# Patient Record
Sex: MALE | Race: WHITE | Employment: OTHER | URBAN - METROPOLITAN AREA
[De-identification: names, ages, dates, MRNs, and addresses within clinical notes are randomized per-mention and may not be internally consistent; named-entity substitution may affect disease eponyms.]

---

## 2018-06-27 ENCOUNTER — HOSPITAL ENCOUNTER (OUTPATIENT)
Dept: MRI IMAGING | Age: 83
Discharge: HOME | End: 2018-06-29
Payer: MEDICARE

## 2018-06-27 DIAGNOSIS — M54.17 LUMBOSACRAL RADICULOPATHY: ICD-10-CM

## 2018-06-27 PROCEDURE — 72148 MRI LUMBAR SPINE W/O DYE: CPT

## 2018-11-01 ENCOUNTER — HOSPITAL ENCOUNTER (OUTPATIENT)
Dept: ULTRASOUND IMAGING | Age: 83
Discharge: HOME | End: 2018-11-01
Payer: MEDICARE

## 2018-11-01 ENCOUNTER — HOSPITAL ENCOUNTER (OUTPATIENT)
Dept: ULTRASOUND IMAGING | Age: 83
End: 2018-11-01
Payer: MEDICARE

## 2018-11-01 DIAGNOSIS — R33.9 BLADDER RETENTION: ICD-10-CM

## 2018-11-01 PROCEDURE — 76775 US EXAM ABDO BACK WALL LIM: CPT

## 2018-11-01 PROCEDURE — 76770 US EXAM ABDO BACK WALL COMP: CPT

## 2019-02-22 ENCOUNTER — HOSPITAL ENCOUNTER (OUTPATIENT)
Age: 84
Discharge: HOME | End: 2019-02-22
Payer: MEDICARE

## 2019-02-22 LAB
ALBUMIN SERPL-MCNC: 4.2 G/DL (ref 3.5–5.2)
ALP BLD-CCNC: 101 U/L (ref 40–129)
ALT SERPL-CCNC: 21 U/L (ref 0–40)
ANION GAP SERPL CALCULATED.3IONS-SCNC: 9 MMOL/L (ref 7–16)
AST SERPL-CCNC: 20 U/L (ref 0–39)
BASOPHILS ABSOLUTE: 0.02 E9/L (ref 0–0.2)
BASOPHILS RELATIVE PERCENT: 0.3 % (ref 0–2)
BILIRUB SERPL-MCNC: 0.7 MG/DL (ref 0–1.2)
BUN BLDV-MCNC: 25 MG/DL (ref 8–23)
CALCIUM SERPL-MCNC: 9.2 MG/DL (ref 8.6–10.2)
CHLORIDE BLD-SCNC: 104 MMOL/L (ref 98–107)
CO2: 26 MMOL/L (ref 22–29)
CREAT SERPL-MCNC: 1.3 MG/DL (ref 0.7–1.2)
EOSINOPHILS ABSOLUTE: 0.16 E9/L (ref 0.05–0.5)
EOSINOPHILS RELATIVE PERCENT: 2.6 % (ref 0–6)
GAMMA GLUTAMYL TRANSFERASE: 18 U/L (ref 10–71)
GFR AFRICAN AMERICAN: >60
GFR NON-AFRICAN AMERICAN: 52 ML/MIN/1.73
GLUCOSE BLD-MCNC: 117 MG/DL (ref 74–99)
HBA1C MFR BLD: 5.8 % (ref 4–5.6)
HCT VFR BLD CALC: 39.7 % (ref 37–54)
HEMOGLOBIN: 12.6 G/DL (ref 12.5–16.5)
IMMATURE GRANULOCYTES #: 0.02 E9/L
IMMATURE GRANULOCYTES %: 0.3 % (ref 0–5)
LYMPHOCYTES ABSOLUTE: 1.56 E9/L (ref 1.5–4)
LYMPHOCYTES RELATIVE PERCENT: 25.2 % (ref 20–42)
MCH RBC QN AUTO: 31 PG (ref 26–35)
MCHC RBC AUTO-ENTMCNC: 31.7 % (ref 32–34.5)
MCV RBC AUTO: 97.5 FL (ref 80–99.9)
MONOCYTES ABSOLUTE: 0.48 E9/L (ref 0.1–0.95)
MONOCYTES RELATIVE PERCENT: 7.7 % (ref 2–12)
NEUTROPHILS ABSOLUTE: 3.96 E9/L (ref 1.8–7.3)
NEUTROPHILS RELATIVE PERCENT: 63.9 % (ref 43–80)
PDW BLD-RTO: 12.9 FL (ref 11.5–15)
PLATELET # BLD: 163 E9/L (ref 130–450)
PMV BLD AUTO: 10.7 FL (ref 7–12)
POTASSIUM SERPL-SCNC: 4.9 MMOL/L (ref 3.5–5)
RBC # BLD: 4.07 E12/L (ref 3.8–5.8)
SODIUM BLD-SCNC: 139 MMOL/L (ref 132–146)
TOTAL PROTEIN: 7.1 G/DL (ref 6.4–8.3)
WBC # BLD: 6.2 E9/L (ref 4.5–11.5)

## 2019-02-22 PROCEDURE — 36415 COLL VENOUS BLD VENIPUNCTURE: CPT

## 2019-02-22 PROCEDURE — 83036 HEMOGLOBIN GLYCOSYLATED A1C: CPT

## 2019-02-22 PROCEDURE — 82977 ASSAY OF GGT: CPT

## 2019-02-22 PROCEDURE — 80053 COMPREHEN METABOLIC PANEL: CPT

## 2019-02-22 PROCEDURE — 85025 COMPLETE CBC W/AUTO DIFF WBC: CPT

## 2019-02-26 ENCOUNTER — HOSPITAL ENCOUNTER (OUTPATIENT)
Dept: CT IMAGING | Age: 84
Discharge: HOME | End: 2019-02-26
Payer: MEDICARE

## 2019-02-26 DIAGNOSIS — I70.219 ATHEROSCLEROSIS OF NATIVE ARTERY OF LOWER EXTREMITY WITH INTERMITTENT CLAUDICATION, UNSPECIFIED LATERALITY (HCC): ICD-10-CM

## 2019-02-26 PROCEDURE — 70460 CT HEAD/BRAIN W/DYE: CPT

## 2019-02-26 PROCEDURE — 6360000004 HC RX CONTRAST MEDICATION: Performed by: RADIOLOGY

## 2019-02-26 RX ADMIN — IOPAMIDOL 50 ML: 755 INJECTION, SOLUTION INTRAVENOUS at 11:05

## 2019-03-25 ENCOUNTER — HOSPITAL ENCOUNTER (OUTPATIENT)
Age: 84
Discharge: HOME | End: 2019-03-25
Payer: MEDICARE

## 2019-03-25 PROCEDURE — 88350 IMFLUOR EA ADDL 1ANTB STN PX: CPT

## 2019-03-25 PROCEDURE — 88346 IMFLUOR 1ST 1ANTB STAIN PX: CPT

## 2019-03-25 PROCEDURE — 83516 IMMUNOASSAY NONANTIBODY: CPT

## 2019-04-03 LAB
Lab: NORMAL
REPORT: NORMAL
THIS TEST SENT TO: NORMAL

## 2019-04-30 ENCOUNTER — OFFICE VISIT (OUTPATIENT)
Dept: NEUROLOGY | Age: 84
End: 2019-04-30
Payer: MEDICARE

## 2019-04-30 VITALS
DIASTOLIC BLOOD PRESSURE: 50 MMHG | HEIGHT: 68 IN | SYSTOLIC BLOOD PRESSURE: 130 MMHG | BODY MASS INDEX: 27.43 KG/M2 | WEIGHT: 181 LBS

## 2019-04-30 DIAGNOSIS — M48.061 SPINAL STENOSIS OF LUMBAR REGION, UNSPECIFIED WHETHER NEUROGENIC CLAUDICATION PRESENT: ICD-10-CM

## 2019-04-30 DIAGNOSIS — G25.2 INTENTION TREMOR: ICD-10-CM

## 2019-04-30 DIAGNOSIS — Z98.890 HISTORY OF CAROTID ENDARTERECTOMY: Chronic | ICD-10-CM

## 2019-04-30 DIAGNOSIS — R26.2 AMBULATORY DYSFUNCTION: Chronic | ICD-10-CM

## 2019-04-30 DIAGNOSIS — Z95.828 HISTORY OF LEFT COMMON CAROTID ARTERY STENT PLACEMENT: Chronic | ICD-10-CM

## 2019-04-30 DIAGNOSIS — Z98.890 HISTORY OF LEFT COMMON CAROTID ARTERY STENT PLACEMENT: Chronic | ICD-10-CM

## 2019-04-30 DIAGNOSIS — M62.838 MUSCLE SPASTICITY: Primary | ICD-10-CM

## 2019-04-30 PROCEDURE — G8598 ASA/ANTIPLAT THER USED: HCPCS | Performed by: PSYCHIATRY & NEUROLOGY

## 2019-04-30 PROCEDURE — 1036F TOBACCO NON-USER: CPT | Performed by: PSYCHIATRY & NEUROLOGY

## 2019-04-30 PROCEDURE — G8419 CALC BMI OUT NRM PARAM NOF/U: HCPCS | Performed by: PSYCHIATRY & NEUROLOGY

## 2019-04-30 PROCEDURE — 99204 OFFICE O/P NEW MOD 45 MIN: CPT | Performed by: PSYCHIATRY & NEUROLOGY

## 2019-04-30 PROCEDURE — 4040F PNEUMOC VAC/ADMIN/RCVD: CPT | Performed by: PSYCHIATRY & NEUROLOGY

## 2019-04-30 PROCEDURE — 1123F ACP DISCUSS/DSCN MKR DOCD: CPT | Performed by: PSYCHIATRY & NEUROLOGY

## 2019-04-30 PROCEDURE — G8428 CUR MEDS NOT DOCUMENT: HCPCS | Performed by: PSYCHIATRY & NEUROLOGY

## 2019-04-30 RX ORDER — TAMSULOSIN HYDROCHLORIDE 0.4 MG/1
0.8 CAPSULE ORAL NIGHTLY
Refills: 0 | COMMUNITY
Start: 2019-04-11

## 2019-04-30 RX ORDER — METOPROLOL TARTRATE 50 MG/1
25 TABLET, FILM COATED ORAL 2 TIMES DAILY
COMMUNITY

## 2019-04-30 ASSESSMENT — ENCOUNTER SYMPTOMS
ALLERGIC/IMMUNOLOGIC NEGATIVE: 1
RESPIRATORY NEGATIVE: 1
EYES NEGATIVE: 1
BACK PAIN: 1
GASTROINTESTINAL NEGATIVE: 1

## 2019-04-30 NOTE — PROGRESS NOTES
Neurology Consult Note:    Patient: Ba Correa  : 1931  Date: 19  Referring provider: Pina Resendez MD    Referral to Neurology: Neurologic evaluation for possible Parkinson's. History of severe multilevel lumbar spinal stenosis, hx chronic ambulatory dysfunction-using quad cane. Dear Pina Resendez MD     Thank you for your referral of Ba Correa an 72-year-old man referred for evaluation of possible Parkinson's. He was previously tried on Sinemet twice but reports he had a \"skin rash or reaction\" and it was discontinued, but he did not notice any difference or improvement of his symptoms including gait, speech, or slowness of movement. He has been using a quad cane for about 8 months. He has chronic ambulatory dysfunction over the past 2 years approximately by his account. He has no pill-rolling or resting tremors observed. There is stiffness reported of the left greater than right arms. There is no history of TIA symptoms. Lab Data: Reviewed from 19; elevated BUN/creatinine, blood glucose 117. Normal LFTs. Imaging Data:, head CT scan without contrast, reviewed:   Prominence of ventricles and sulci is compatible with age-related   volume loss. No extra-axial collection. No acute intracranial   hemorrhage. No cerebral edema or mass effect. No CT evidence of acute,   large territorial infarction. There are a few patchy areas of   hypoattenuation within the periventricular and subcortical white   matter, which are nonspecific but most compatible with mild changes of   microangiopathy. There is no abnormal brain parenchymal enhancement. Major dural venous   sinuses demonstrate normal enhancement. 18, MR lumbar spine, reviewed:  Multilevel spinal stenosis. Central spinal canal stenosis   is most severe at the L4-5 level. Mild progression since previous   study. 6/30/15, X-ray lumbar spine:   Five nonrib-bearing lumbar vertebral bodies.  Mild lumbar  History of left common carotid artery stent placement    Ambulatory dysfunction       Past Medical History:   Diagnosis Date    Ambulatory dysfunction 4/30/2019    Hx severe lumbar psinal stenosis, severe at L4/5 level-MR lumbar spine, 2018    Blood transfusion     CAD (coronary artery disease)     CH[facility]cong[facility] heart failure) ([facility])     Diabetes mellitus ([facility]     History of carotid endarterectomy 4/30/2019    Right CEA    History of left common carotid artery stent placement 4/30/2019    Hyperlipidemia     Hypertension        Past Surgical History:   Procedure Laterality Date    ABDOMINAL AORTIC ANEURYSM REPAIR, ENDOVASCULAR  1 year ago    2 stents    BACK SURGERY      CARDIAC SURGERY  2000    2 vesselCABG    CAROTID ENDARTERECTOMY  rt and left stent in rt    Bilateral    CATARACT REMOVAL WITH IMPLANT Right 04/08/14    CATARACT REMOVAL WITH IMPLANT Left 9 23 14    COLONOSCOPY      ENDOSCOPY, COLON, DIAGNOSTIC      TONSILLECTOMY      UPPER GASTROINTESTINAL ENDOSCOPY         No family history on file. no hx hereditary neuropathy/neurmuscular disease known.     Social History     Socioeconomic History    Marital status:      Spouse name: Not on file    Number of children: Not on file    Years of education: Not on file    Highest education level: Not on file   Occupational History    Not on file   Social Needs    Financial resource strain: Not on file    Food insecurity:     Worry: Not on file     Inability: Not on file    Transportation needs:     Medical: Not on file     Non-medical: Not on file   Tobacco Use    Smoking status: Former Smoker    Smokeless tobacco: Never Used    Tobacco comment: quit smoking 15 yrs ago   Substance and Sexual Activity    Alcohol use: No    Drug use: No    Sexual activity: Not on file   Lifestyle    Physical activity:     Days per week: Not on file     Minutes per session: Not on file    Stress: Not on file   Relationships    Social connections:     Talks on phone: Not on file     Gets together: Not on file     Attends Sabianism service: Not on file     Active member of club or organization: Not on file     Attends meetings of clubs or organizations: Not on file     Relationship status: Not on file    Intimate partner violence:     Fear of current or ex partner: Not on file     Emotionally abused: Not on file     Physically abused: Not on file     Forced sexual activity: Not on file   Other Topics Concern    Not on file   Social History Narrative    Not on file     Review of Systems   Constitutional: Negative. HENT: Positive for hearing loss. Eyes: Negative. Respiratory: Negative. Cardiovascular: Negative. Gastrointestinal: Negative. Endocrine: Negative. Genitourinary: Negative. Musculoskeletal: Positive for arthralgias, back pain and gait problem. Hx severe lumbar spinal stenosis, most significant at L4/5 level   Skin: Negative. Allergic/Immunologic: Negative. Neurological:        Chronic ambulatory dysfunction; mild intention tremor on FTN testing   Hematological: Negative. Psychiatric/Behavioral: The patient is nervous/anxious. All other systems reviewed and are negative. Neurologic Exam:  BP (!) 130/50 (Site: Right Upper Arm, Position: Sitting, Cuff Size: Medium Adult)   Ht 5' 8\" (1.727 m)   Wt 181 lb (82.1 kg)   BMI 27.52 kg/m²   General appearance: Alert, cooperative, anxious, well-nourished, well-groomed, seated next to his granddaughter, no acute distress  HEENT: Normocephalic/atraumatic. Neck: Supple  Cardiac: RRR  Respiratory: grossly clear  Extremities: No edema, erythema  Skin: No apparent lesions or rashes  Musculoskeletal: Negative bilateral straight leg raising test, no fasciculations. Mild intention tremor on finger-to-nose testing. No resting or pill-rolling tremors observed. No foot drop.   Mental Status: Alert, oriented ×3  Speech/Language: Mildly hypophonic, no paraphasic

## 2019-05-11 ENCOUNTER — HOSPITAL ENCOUNTER (OUTPATIENT)
Age: 84
Discharge: HOME | End: 2019-05-11
Payer: MEDICARE

## 2019-05-11 ENCOUNTER — HOSPITAL ENCOUNTER (OUTPATIENT)
Dept: CT IMAGING | Age: 84
Discharge: HOME | End: 2019-05-11
Payer: MEDICARE

## 2019-05-11 DIAGNOSIS — R26.2 AMBULATORY DYSFUNCTION: Chronic | ICD-10-CM

## 2019-05-11 DIAGNOSIS — Z95.828 HISTORY OF LEFT COMMON CAROTID ARTERY STENT PLACEMENT: Chronic | ICD-10-CM

## 2019-05-11 DIAGNOSIS — Z98.890 HISTORY OF LEFT COMMON CAROTID ARTERY STENT PLACEMENT: Chronic | ICD-10-CM

## 2019-05-11 DIAGNOSIS — G25.2 INTENTION TREMOR: ICD-10-CM

## 2019-05-11 DIAGNOSIS — M62.838 MUSCLE SPASTICITY: ICD-10-CM

## 2019-05-11 DIAGNOSIS — M48.061 SPINAL STENOSIS OF LUMBAR REGION, UNSPECIFIED WHETHER NEUROGENIC CLAUDICATION PRESENT: ICD-10-CM

## 2019-05-11 DIAGNOSIS — Z98.890 HISTORY OF CAROTID ENDARTERECTOMY: Chronic | ICD-10-CM

## 2019-05-11 LAB
AMMONIA: 17 UMOL/L (ref 16–60)
C-REACTIVE PROTEIN: <0.1 MG/DL (ref 0–0.4)
FOLATE: >20 NG/ML (ref 4.8–24.2)
MAGNESIUM: 2.6 MG/DL (ref 1.6–2.6)
SEDIMENTATION RATE, ERYTHROCYTE: 13 MM/HR (ref 0–15)
TOTAL CK: 60 U/L (ref 20–200)
TSH SERPL DL<=0.05 MIU/L-ACNC: 1.92 UIU/ML (ref 0.27–4.2)
VITAMIN B-12: 643 PG/ML (ref 211–946)

## 2019-05-11 PROCEDURE — 84207 ASSAY OF VITAMIN B-6: CPT

## 2019-05-11 PROCEDURE — 84443 ASSAY THYROID STIM HORMONE: CPT

## 2019-05-11 PROCEDURE — 84165 PROTEIN E-PHORESIS SERUM: CPT

## 2019-05-11 PROCEDURE — 82085 ASSAY OF ALDOLASE: CPT

## 2019-05-11 PROCEDURE — 85651 RBC SED RATE NONAUTOMATED: CPT

## 2019-05-11 PROCEDURE — 82140 ASSAY OF AMMONIA: CPT

## 2019-05-11 PROCEDURE — 82550 ASSAY OF CK (CPK): CPT

## 2019-05-11 PROCEDURE — 86592 SYPHILIS TEST NON-TREP QUAL: CPT

## 2019-05-11 PROCEDURE — 82746 ASSAY OF FOLIC ACID SERUM: CPT

## 2019-05-11 PROCEDURE — 83735 ASSAY OF MAGNESIUM: CPT

## 2019-05-11 PROCEDURE — 83921 ORGANIC ACID SINGLE QUANT: CPT

## 2019-05-11 PROCEDURE — 72125 CT NECK SPINE W/O DYE: CPT

## 2019-05-11 PROCEDURE — 82607 VITAMIN B-12: CPT

## 2019-05-11 PROCEDURE — 86140 C-REACTIVE PROTEIN: CPT

## 2019-05-11 PROCEDURE — 36415 COLL VENOUS BLD VENIPUNCTURE: CPT

## 2019-05-13 ENCOUNTER — TELEPHONE (OUTPATIENT)
Dept: NEUROLOGY | Age: 84
End: 2019-05-13

## 2019-05-13 LAB
ALBUMIN SERPL-MCNC: 3.8 G/DL (ref 3.5–4.7)
ALPHA-1-GLOBULIN: 0.3 G/DL (ref 0.2–0.4)
ALPHA-2-GLOBULIN: 0.6 G/FL (ref 0.5–1)
BETA GLOBULIN: 1 G/DL (ref 0.8–1.3)
ELECTROPHORESIS: NORMAL
GAMMA GLOBULIN: 1.4 G/DL (ref 0.7–1.6)
RPR: NORMAL
TOTAL PROTEIN: 7.1 G/DL (ref 6.4–8.3)

## 2019-05-13 NOTE — TELEPHONE ENCOUNTER
----- Message from Regino Leung MD sent at 5/13/2019 10:50 AM EDT -----  Please notify patient of normal lab results 15-Riki-2018 09:39

## 2019-05-13 NOTE — TELEPHONE ENCOUNTER
----- Message from Jayjay Goss MD sent at 5/13/2019 10:52 AM EDT -----  Please notify patient of abnormal CT of cervical spine report: multilevel spondylosis, degen.  Disc disease, most significant at C5/6 level' no significant spinal canal stenosis

## 2019-05-13 NOTE — TELEPHONE ENCOUNTER
Per Dr. Stephanie Tobar pt notified of abnormal CT of cervical spine, multilevel spondylosis, degen disc disease.     Pt verbalized understanding

## 2019-05-14 LAB — ALDOLASE: 3.6 U/L (ref 1.5–8.1)

## 2019-05-16 LAB
METHYLMALONIC ACID: 0.3 UMOL/L (ref 0–0.4)
VITAMIN B6: 39.9 NMOL/L (ref 20–125)

## 2019-10-15 ENCOUNTER — APPOINTMENT (OUTPATIENT)
Dept: CT IMAGING | Age: 84
End: 2019-10-15
Payer: MEDICARE

## 2019-10-15 ENCOUNTER — HOSPITAL ENCOUNTER (EMERGENCY)
Age: 84
Discharge: HOME | End: 2019-10-15
Attending: EMERGENCY MEDICINE
Payer: MEDICARE

## 2019-10-15 VITALS
HEART RATE: 81 BPM | TEMPERATURE: 98.5 F | HEIGHT: 67 IN | SYSTOLIC BLOOD PRESSURE: 157 MMHG | OXYGEN SATURATION: 96 % | BODY MASS INDEX: 25.43 KG/M2 | DIASTOLIC BLOOD PRESSURE: 75 MMHG | WEIGHT: 162 LBS | RESPIRATION RATE: 16 BRPM

## 2019-10-15 DIAGNOSIS — K59.00 CONSTIPATION, UNSPECIFIED CONSTIPATION TYPE: ICD-10-CM

## 2019-10-15 DIAGNOSIS — R53.1 GENERALIZED WEAKNESS: Primary | ICD-10-CM

## 2019-10-15 DIAGNOSIS — R10.84 GENERALIZED ABDOMINAL PAIN: ICD-10-CM

## 2019-10-15 LAB
ALBUMIN SERPL-MCNC: 3.7 G/DL (ref 3.5–5.2)
ALP BLD-CCNC: 80 U/L (ref 40–129)
ALT SERPL-CCNC: 23 U/L (ref 0–40)
ANION GAP SERPL CALCULATED.3IONS-SCNC: 10 MMOL/L (ref 7–16)
AST SERPL-CCNC: 34 U/L (ref 0–39)
BACTERIA: ABNORMAL /HPF
BILIRUB SERPL-MCNC: 0.5 MG/DL (ref 0–1.2)
BILIRUBIN DIRECT: <0.2 MG/DL (ref 0–0.3)
BILIRUBIN URINE: NEGATIVE
BILIRUBIN, INDIRECT: NORMAL MG/DL (ref 0–1)
BLOOD, URINE: NEGATIVE
BUN BLDV-MCNC: 39 MG/DL (ref 8–23)
CALCIUM SERPL-MCNC: 9 MG/DL (ref 8.6–10.2)
CHLORIDE BLD-SCNC: 106 MMOL/L (ref 98–107)
CLARITY: CLEAR
CO2: 25 MMOL/L (ref 22–29)
CO2: 25 MMOL/L (ref 22–29)
COLOR: YELLOW
CREAT SERPL-MCNC: 1.4 MG/DL (ref 0.7–1.2)
EKG ATRIAL RATE: 72 BPM
EKG P AXIS: 73 DEGREES
EKG P-R INTERVAL: 162 MS
EKG Q-T INTERVAL: 402 MS
EKG QRS DURATION: 80 MS
EKG QTC CALCULATION (BAZETT): 440 MS
EKG R AXIS: -20 DEGREES
EKG T AXIS: 24 DEGREES
EKG VENTRICULAR RATE: 72 BPM
EPITHELIAL CELLS, UA: ABNORMAL /HPF
GFR AFRICAN AMERICAN: 58
GFR AFRICAN AMERICAN: >60
GFR NON-AFRICAN AMERICAN: 48 ML/MIN/1.73
GFR NON-AFRICAN AMERICAN: 57 ML/MIN/1.73
GLUCOSE BLD-MCNC: 48 MG/DL (ref 74–99)
GLUCOSE BLD-MCNC: 99 MG/DL (ref 74–99)
GLUCOSE URINE: NEGATIVE MG/DL
HCT VFR BLD CALC: 38.7 % (ref 37–54)
HEMOGLOBIN: 12.6 G/DL (ref 12.5–16.5)
KETONES, URINE: NEGATIVE MG/DL
LACTIC ACID: 0.9 MMOL/L (ref 0.5–2.2)
LEUKOCYTE ESTERASE, URINE: NEGATIVE
MCH RBC QN AUTO: 31.4 PG (ref 26–35)
MCHC RBC AUTO-ENTMCNC: 32.6 % (ref 32–34.5)
MCV RBC AUTO: 96.5 FL (ref 80–99.9)
NITRITE, URINE: NEGATIVE
PDW BLD-RTO: 12.5 FL (ref 11.5–15)
PH UA: 5 (ref 5–9)
PLATELET # BLD: 152 E9/L (ref 130–450)
PMV BLD AUTO: 10.8 FL (ref 7–12)
POC ANION GAP: 11 MMOL/L (ref 7–16)
POC BUN: 37 MG/DL (ref 8–23)
POC CHLORIDE: 104 MMOL/L (ref 100–108)
POC CREATININE: 1.2 MG/DL (ref 0.7–1.2)
POC POTASSIUM: 3.9 MMOL/L (ref 3.5–5)
POC SODIUM: 140 MMOL/L (ref 132–146)
POTASSIUM SERPL-SCNC: 5.1 MMOL/L (ref 3.5–5)
PROTEIN UA: 30 MG/DL
RBC # BLD: 4.01 E12/L (ref 3.8–5.8)
RBC UA: ABNORMAL /HPF (ref 0–2)
SODIUM BLD-SCNC: 141 MMOL/L (ref 132–146)
SPECIFIC GRAVITY UA: 1.02 (ref 1–1.03)
TOTAL PROTEIN: 6.9 G/DL (ref 6.4–8.3)
TROPONIN: <0.01 NG/ML (ref 0–0.03)
UROBILINOGEN, URINE: 0.2 E.U./DL
WBC # BLD: 7 E9/L (ref 4.5–11.5)
WBC UA: ABNORMAL /HPF (ref 0–5)

## 2019-10-15 PROCEDURE — 93010 ELECTROCARDIOGRAM REPORT: CPT | Performed by: INTERNAL MEDICINE

## 2019-10-15 PROCEDURE — 84520 ASSAY OF UREA NITROGEN: CPT

## 2019-10-15 PROCEDURE — 80048 BASIC METABOLIC PNL TOTAL CA: CPT

## 2019-10-15 PROCEDURE — 74177 CT ABD & PELVIS W/CONTRAST: CPT

## 2019-10-15 PROCEDURE — 97165 OT EVAL LOW COMPLEX 30 MIN: CPT

## 2019-10-15 PROCEDURE — 70450 CT HEAD/BRAIN W/O DYE: CPT

## 2019-10-15 PROCEDURE — 36415 COLL VENOUS BLD VENIPUNCTURE: CPT

## 2019-10-15 PROCEDURE — 97161 PT EVAL LOW COMPLEX 20 MIN: CPT | Performed by: PHYSICAL THERAPIST

## 2019-10-15 PROCEDURE — 99285 EMERGENCY DEPT VISIT HI MDM: CPT

## 2019-10-15 PROCEDURE — 6360000004 HC RX CONTRAST MEDICATION: Performed by: RADIOLOGY

## 2019-10-15 PROCEDURE — 80076 HEPATIC FUNCTION PANEL: CPT

## 2019-10-15 PROCEDURE — 6360000002 HC RX W HCPCS: Performed by: EMERGENCY MEDICINE

## 2019-10-15 PROCEDURE — 96374 THER/PROPH/DIAG INJ IV PUSH: CPT

## 2019-10-15 PROCEDURE — 82947 ASSAY GLUCOSE BLOOD QUANT: CPT

## 2019-10-15 PROCEDURE — 85027 COMPLETE CBC AUTOMATED: CPT

## 2019-10-15 PROCEDURE — 97530 THERAPEUTIC ACTIVITIES: CPT

## 2019-10-15 PROCEDURE — 83605 ASSAY OF LACTIC ACID: CPT

## 2019-10-15 PROCEDURE — 97530 THERAPEUTIC ACTIVITIES: CPT | Performed by: PHYSICAL THERAPIST

## 2019-10-15 PROCEDURE — 6370000000 HC RX 637 (ALT 250 FOR IP): Performed by: EMERGENCY MEDICINE

## 2019-10-15 PROCEDURE — 81001 URINALYSIS AUTO W/SCOPE: CPT

## 2019-10-15 PROCEDURE — 93005 ELECTROCARDIOGRAM TRACING: CPT | Performed by: EMERGENCY MEDICINE

## 2019-10-15 PROCEDURE — 84484 ASSAY OF TROPONIN QUANT: CPT

## 2019-10-15 PROCEDURE — 80051 ELECTROLYTE PANEL: CPT

## 2019-10-15 PROCEDURE — 82565 ASSAY OF CREATININE: CPT

## 2019-10-15 RX ORDER — DOCUSATE SODIUM 100 MG/1
100 CAPSULE, LIQUID FILLED ORAL ONCE
Status: COMPLETED | OUTPATIENT
Start: 2019-10-15 | End: 2019-10-15

## 2019-10-15 RX ORDER — ASPIRIN 81 MG/1
81 TABLET, CHEWABLE ORAL DAILY
COMMUNITY

## 2019-10-15 RX ORDER — HYDROCODONE BITARTRATE AND ACETAMINOPHEN 5; 325 MG/1; MG/1
1 TABLET ORAL 2 TIMES DAILY PRN
Status: ON HOLD | COMMUNITY
End: 2021-01-01 | Stop reason: ALTCHOICE

## 2019-10-15 RX ORDER — FINASTERIDE 5 MG/1
5 TABLET, FILM COATED ORAL DAILY
Status: ON HOLD | COMMUNITY
End: 2021-01-01 | Stop reason: ALTCHOICE

## 2019-10-15 RX ORDER — ISOSORBIDE MONONITRATE 30 MG/1
30 TABLET, EXTENDED RELEASE ORAL DAILY
COMMUNITY

## 2019-10-15 RX ORDER — TRIAMCINOLONE ACETONIDE 1 MG/G
1 CREAM TOPICAL 2 TIMES DAILY
Status: ON HOLD | COMMUNITY
End: 2021-01-01

## 2019-10-15 RX ORDER — METHOCARBAMOL 500 MG/1
500 TABLET, FILM COATED ORAL 2 TIMES DAILY PRN
Status: ON HOLD | COMMUNITY
End: 2021-01-01 | Stop reason: ALTCHOICE

## 2019-10-15 RX ORDER — ROSUVASTATIN CALCIUM 5 MG/1
5 TABLET, COATED ORAL EVERY OTHER DAY
COMMUNITY

## 2019-10-15 RX ORDER — FUROSEMIDE 20 MG/1
20 TABLET ORAL DAILY
COMMUNITY

## 2019-10-15 RX ORDER — FENTANYL CITRATE 50 UG/ML
25 INJECTION, SOLUTION INTRAMUSCULAR; INTRAVENOUS ONCE
Status: COMPLETED | OUTPATIENT
Start: 2019-10-15 | End: 2019-10-15

## 2019-10-15 RX ORDER — CLONIDINE HYDROCHLORIDE 0.3 MG/1
0.3 TABLET ORAL 2 TIMES DAILY
COMMUNITY

## 2019-10-15 RX ADMIN — IOPAMIDOL 80 ML: 755 INJECTION, SOLUTION INTRAVENOUS at 15:32

## 2019-10-15 RX ADMIN — DOCUSATE SODIUM 100 MG: 100 CAPSULE, LIQUID FILLED ORAL at 17:09

## 2019-10-15 RX ADMIN — FENTANYL CITRATE 25 MCG: 50 INJECTION, SOLUTION INTRAMUSCULAR; INTRAVENOUS at 14:02

## 2019-10-15 ASSESSMENT — PAIN SCALES - GENERAL
PAINLEVEL_OUTOF10: 7
PAINLEVEL_OUTOF10: 7
PAINLEVEL_OUTOF10: 2

## 2019-10-15 ASSESSMENT — ENCOUNTER SYMPTOMS
CHEST TIGHTNESS: 1
ABDOMINAL PAIN: 1
SHORTNESS OF BREATH: 0
VOMITING: 0
COUGH: 0

## 2019-10-15 ASSESSMENT — PAIN DESCRIPTION - DESCRIPTORS: DESCRIPTORS: TIGHTNESS

## 2019-10-15 ASSESSMENT — PAIN DESCRIPTION - LOCATION
LOCATION: CHEST
LOCATION: CHEST

## 2019-10-15 ASSESSMENT — PAIN DESCRIPTION - PAIN TYPE
TYPE: ACUTE PAIN
TYPE: ACUTE PAIN

## 2020-02-18 ENCOUNTER — HOSPITAL ENCOUNTER (OUTPATIENT)
Age: 85
Discharge: HOME | End: 2020-02-20
Payer: MEDICARE

## 2020-02-18 LAB
ALBUMIN SERPL-MCNC: 3.9 G/DL (ref 3.5–5.2)
ALP BLD-CCNC: 86 U/L (ref 40–129)
ALT SERPL-CCNC: 13 U/L (ref 0–40)
ANION GAP SERPL CALCULATED.3IONS-SCNC: 12 MMOL/L (ref 7–16)
AST SERPL-CCNC: 19 U/L (ref 0–39)
BASOPHILS ABSOLUTE: 0.02 E9/L (ref 0–0.2)
BASOPHILS RELATIVE PERCENT: 0.3 % (ref 0–2)
BILIRUB SERPL-MCNC: 0.8 MG/DL (ref 0–1.2)
BUN BLDV-MCNC: 32 MG/DL (ref 8–23)
CALCIUM SERPL-MCNC: 9 MG/DL (ref 8.6–10.2)
CHLORIDE BLD-SCNC: 104 MMOL/L (ref 98–107)
CHOLESTEROL, TOTAL: 108 MG/DL (ref 0–199)
CO2: 24 MMOL/L (ref 22–29)
CREAT SERPL-MCNC: 1.4 MG/DL (ref 0.7–1.2)
EOSINOPHILS ABSOLUTE: 0.09 E9/L (ref 0.05–0.5)
EOSINOPHILS RELATIVE PERCENT: 1.4 % (ref 0–6)
GFR AFRICAN AMERICAN: 58
GFR NON-AFRICAN AMERICAN: 48 ML/MIN/1.73
GLUCOSE BLD-MCNC: 82 MG/DL (ref 74–99)
HBA1C MFR BLD: 4.3 % (ref 4–5.6)
HCT VFR BLD CALC: 35 % (ref 37–54)
HDLC SERPL-MCNC: 34 MG/DL
HEMOGLOBIN: 10.9 G/DL (ref 12.5–16.5)
IMMATURE GRANULOCYTES #: 0.01 E9/L
IMMATURE GRANULOCYTES %: 0.2 % (ref 0–5)
LDL CHOLESTEROL CALCULATED: 53 MG/DL (ref 0–99)
LYMPHOCYTES ABSOLUTE: 1.97 E9/L (ref 1.5–4)
LYMPHOCYTES RELATIVE PERCENT: 31.2 % (ref 20–42)
MCH RBC QN AUTO: 34 PG (ref 26–35)
MCHC RBC AUTO-ENTMCNC: 31.1 % (ref 32–34.5)
MCV RBC AUTO: 109 FL (ref 80–99.9)
MONOCYTES ABSOLUTE: 0.58 E9/L (ref 0.1–0.95)
MONOCYTES RELATIVE PERCENT: 9.2 % (ref 2–12)
NEUTROPHILS ABSOLUTE: 3.65 E9/L (ref 1.8–7.3)
NEUTROPHILS RELATIVE PERCENT: 57.7 % (ref 43–80)
PDW BLD-RTO: 13.1 FL (ref 11.5–15)
PLATELET # BLD: 144 E9/L (ref 130–450)
PMV BLD AUTO: 11.3 FL (ref 7–12)
POTASSIUM SERPL-SCNC: 4.6 MMOL/L (ref 3.5–5)
RBC # BLD: 3.21 E12/L (ref 3.8–5.8)
SODIUM BLD-SCNC: 140 MMOL/L (ref 132–146)
TOTAL PROTEIN: 6.9 G/DL (ref 6.4–8.3)
TRIGL SERPL-MCNC: 105 MG/DL (ref 0–149)
TSH SERPL DL<=0.05 MIU/L-ACNC: 1.65 UIU/ML (ref 0.27–4.2)
VLDLC SERPL CALC-MCNC: 21 MG/DL
WBC # BLD: 6.3 E9/L (ref 4.5–11.5)

## 2020-02-18 PROCEDURE — 80053 COMPREHEN METABOLIC PANEL: CPT

## 2020-02-18 PROCEDURE — 83036 HEMOGLOBIN GLYCOSYLATED A1C: CPT

## 2020-02-18 PROCEDURE — 80061 LIPID PANEL: CPT

## 2020-02-18 PROCEDURE — 84443 ASSAY THYROID STIM HORMONE: CPT

## 2020-02-18 PROCEDURE — 85025 COMPLETE CBC W/AUTO DIFF WBC: CPT

## 2020-09-25 ENCOUNTER — HOSPITAL ENCOUNTER (OUTPATIENT)
Age: 85
Discharge: HOME | End: 2020-09-27
Payer: MEDICARE

## 2020-09-25 LAB
ALBUMIN SERPL-MCNC: 3.8 G/DL (ref 3.5–5.2)
ALP BLD-CCNC: 85 U/L (ref 40–129)
ALT SERPL-CCNC: 15 U/L (ref 0–40)
ANION GAP SERPL CALCULATED.3IONS-SCNC: 13 MMOL/L (ref 7–16)
AST SERPL-CCNC: 21 U/L (ref 0–39)
BASOPHILS ABSOLUTE: 0.03 E9/L (ref 0–0.2)
BASOPHILS RELATIVE PERCENT: 0.5 % (ref 0–2)
BILIRUB SERPL-MCNC: 0.9 MG/DL (ref 0–1.2)
BUN BLDV-MCNC: 34 MG/DL (ref 8–23)
CALCIUM SERPL-MCNC: 8.8 MG/DL (ref 8.6–10.2)
CHLORIDE BLD-SCNC: 103 MMOL/L (ref 98–107)
CHOLESTEROL, TOTAL: 84 MG/DL (ref 0–199)
CO2: 24 MMOL/L (ref 22–29)
CREAT SERPL-MCNC: 1.6 MG/DL (ref 0.7–1.2)
EOSINOPHILS ABSOLUTE: 0.08 E9/L (ref 0.05–0.5)
EOSINOPHILS RELATIVE PERCENT: 1.3 % (ref 0–6)
GFR AFRICAN AMERICAN: 49
GFR NON-AFRICAN AMERICAN: 41 ML/MIN/1.73
GLUCOSE BLD-MCNC: 74 MG/DL (ref 74–99)
HBA1C MFR BLD: 4.3 % (ref 4–5.6)
HCT VFR BLD CALC: 36.1 % (ref 37–54)
HDLC SERPL-MCNC: 33 MG/DL
HEMOGLOBIN: 11.5 G/DL (ref 12.5–16.5)
IMMATURE GRANULOCYTES #: 0.01 E9/L
IMMATURE GRANULOCYTES %: 0.2 % (ref 0–5)
LDL CHOLESTEROL CALCULATED: 34 MG/DL (ref 0–99)
LYMPHOCYTES ABSOLUTE: 1.74 E9/L (ref 1.5–4)
LYMPHOCYTES RELATIVE PERCENT: 28.2 % (ref 20–42)
MCH RBC QN AUTO: 33.2 PG (ref 26–35)
MCHC RBC AUTO-ENTMCNC: 31.9 % (ref 32–34.5)
MCV RBC AUTO: 104.3 FL (ref 80–99.9)
MONOCYTES ABSOLUTE: 0.46 E9/L (ref 0.1–0.95)
MONOCYTES RELATIVE PERCENT: 7.5 % (ref 2–12)
NEUTROPHILS ABSOLUTE: 3.84 E9/L (ref 1.8–7.3)
NEUTROPHILS RELATIVE PERCENT: 62.3 % (ref 43–80)
PDW BLD-RTO: 12.7 FL (ref 11.5–15)
PLATELET # BLD: 130 E9/L (ref 130–450)
PMV BLD AUTO: 11.5 FL (ref 7–12)
POTASSIUM SERPL-SCNC: 4.6 MMOL/L (ref 3.5–5)
RBC # BLD: 3.46 E12/L (ref 3.8–5.8)
SODIUM BLD-SCNC: 140 MMOL/L (ref 132–146)
TOTAL PROTEIN: 6.5 G/DL (ref 6.4–8.3)
TRIGL SERPL-MCNC: 86 MG/DL (ref 0–149)
TSH SERPL DL<=0.05 MIU/L-ACNC: 2.27 UIU/ML (ref 0.27–4.2)
VLDLC SERPL CALC-MCNC: 17 MG/DL
WBC # BLD: 6.2 E9/L (ref 4.5–11.5)

## 2020-09-25 PROCEDURE — 83036 HEMOGLOBIN GLYCOSYLATED A1C: CPT

## 2020-09-25 PROCEDURE — 80061 LIPID PANEL: CPT

## 2020-09-25 PROCEDURE — 85025 COMPLETE CBC W/AUTO DIFF WBC: CPT

## 2020-09-25 PROCEDURE — 80053 COMPREHEN METABOLIC PANEL: CPT

## 2020-09-25 PROCEDURE — 84443 ASSAY THYROID STIM HORMONE: CPT

## 2021-01-01 ENCOUNTER — APPOINTMENT (OUTPATIENT)
Dept: GENERAL RADIOLOGY | Age: 86
End: 2021-01-01
Payer: MEDICARE

## 2021-01-01 ENCOUNTER — HOSPITAL ENCOUNTER (EMERGENCY)
Age: 86
Discharge: HOME | End: 2021-08-04
Attending: EMERGENCY MEDICINE
Payer: MEDICARE

## 2021-01-01 ENCOUNTER — HOSPITAL ENCOUNTER (EMERGENCY)
Age: 86
Discharge: HOME | End: 2021-09-17
Attending: EMERGENCY MEDICINE
Payer: MEDICARE

## 2021-01-01 ENCOUNTER — ANESTHESIA EVENT (OUTPATIENT)
Dept: OPERATING ROOM | Age: 86
DRG: 481 | End: 2021-01-01
Payer: MEDICARE

## 2021-01-01 ENCOUNTER — APPOINTMENT (OUTPATIENT)
Dept: GENERAL RADIOLOGY | Age: 86
DRG: 481 | End: 2021-01-01
Payer: MEDICARE

## 2021-01-01 ENCOUNTER — HOSPITAL ENCOUNTER (EMERGENCY)
Age: 86
Discharge: HOME | End: 2021-09-28
Payer: MEDICARE

## 2021-01-01 ENCOUNTER — HOSPITAL ENCOUNTER (EMERGENCY)
Age: 86
Discharge: HOME | End: 2021-10-19
Attending: EMERGENCY MEDICINE
Payer: MEDICARE

## 2021-01-01 ENCOUNTER — APPOINTMENT (OUTPATIENT)
Dept: CT IMAGING | Age: 86
End: 2021-01-01
Payer: MEDICARE

## 2021-01-01 ENCOUNTER — APPOINTMENT (OUTPATIENT)
Dept: GENERAL RADIOLOGY | Age: 86
DRG: 291 | End: 2021-01-01
Payer: MEDICARE

## 2021-01-01 ENCOUNTER — HOSPITAL ENCOUNTER (INPATIENT)
Age: 86
LOS: 4 days | Discharge: NURSING, REHAB OR LONG-TERM CARE | DRG: 291 | End: 2021-06-14
Attending: EMERGENCY MEDICINE | Admitting: INTERNAL MEDICINE
Payer: MEDICARE

## 2021-01-01 ENCOUNTER — ANESTHESIA (OUTPATIENT)
Dept: OPERATING ROOM | Age: 86
DRG: 481 | End: 2021-01-01
Payer: MEDICARE

## 2021-01-01 ENCOUNTER — HOSPITAL ENCOUNTER (INPATIENT)
Age: 86
LOS: 4 days | Discharge: NURSING, REHAB OR LONG-TERM CARE | DRG: 481 | End: 2021-11-15
Attending: EMERGENCY MEDICINE | Admitting: INTERNAL MEDICINE
Payer: MEDICARE

## 2021-01-01 ENCOUNTER — HOSPITAL ENCOUNTER (EMERGENCY)
Age: 86
Discharge: HOME | End: 2021-09-23
Attending: EMERGENCY MEDICINE
Payer: MEDICARE

## 2021-01-01 ENCOUNTER — APPOINTMENT (OUTPATIENT)
Dept: CT IMAGING | Age: 86
DRG: 481 | End: 2021-01-01
Payer: MEDICARE

## 2021-01-01 ENCOUNTER — HOSPITAL ENCOUNTER (EMERGENCY)
Age: 86
Discharge: HOME | End: 2021-08-03
Attending: EMERGENCY MEDICINE
Payer: MEDICARE

## 2021-01-01 ENCOUNTER — HOSPITAL ENCOUNTER (EMERGENCY)
Age: 86
Discharge: HOME | End: 2021-08-02
Attending: EMERGENCY MEDICINE
Payer: MEDICARE

## 2021-01-01 ENCOUNTER — TELEPHONE (OUTPATIENT)
Dept: OTHER | Age: 86
End: 2021-01-01

## 2021-01-01 VITALS
HEART RATE: 80 BPM | BODY MASS INDEX: 26.68 KG/M2 | WEIGHT: 170 LBS | SYSTOLIC BLOOD PRESSURE: 187 MMHG | DIASTOLIC BLOOD PRESSURE: 84 MMHG | HEIGHT: 67 IN | RESPIRATION RATE: 15 BRPM | TEMPERATURE: 97.6 F | OXYGEN SATURATION: 97 %

## 2021-01-01 VITALS
SYSTOLIC BLOOD PRESSURE: 189 MMHG | HEART RATE: 73 BPM | TEMPERATURE: 98.1 F | DIASTOLIC BLOOD PRESSURE: 69 MMHG | OXYGEN SATURATION: 95 % | RESPIRATION RATE: 20 BRPM

## 2021-01-01 VITALS
OXYGEN SATURATION: 94 % | BODY MASS INDEX: 31.39 KG/M2 | TEMPERATURE: 97.9 F | HEIGHT: 67 IN | RESPIRATION RATE: 14 BRPM | SYSTOLIC BLOOD PRESSURE: 152 MMHG | HEART RATE: 73 BPM | WEIGHT: 200 LBS | DIASTOLIC BLOOD PRESSURE: 67 MMHG

## 2021-01-01 VITALS
DIASTOLIC BLOOD PRESSURE: 67 MMHG | HEART RATE: 70 BPM | OXYGEN SATURATION: 92 % | HEIGHT: 67 IN | TEMPERATURE: 98 F | SYSTOLIC BLOOD PRESSURE: 134 MMHG | BODY MASS INDEX: 26.68 KG/M2 | RESPIRATION RATE: 20 BRPM | WEIGHT: 170 LBS

## 2021-01-01 VITALS
OXYGEN SATURATION: 97 % | HEART RATE: 60 BPM | DIASTOLIC BLOOD PRESSURE: 67 MMHG | TEMPERATURE: 97.5 F | SYSTOLIC BLOOD PRESSURE: 181 MMHG | WEIGHT: 168 LBS | BODY MASS INDEX: 26.31 KG/M2 | RESPIRATION RATE: 20 BRPM

## 2021-01-01 VITALS
BODY MASS INDEX: 26.68 KG/M2 | WEIGHT: 170 LBS | SYSTOLIC BLOOD PRESSURE: 151 MMHG | HEART RATE: 94 BPM | OXYGEN SATURATION: 96 % | DIASTOLIC BLOOD PRESSURE: 110 MMHG | HEIGHT: 67 IN | RESPIRATION RATE: 27 BRPM | TEMPERATURE: 98 F

## 2021-01-01 VITALS
HEART RATE: 66 BPM | DIASTOLIC BLOOD PRESSURE: 68 MMHG | RESPIRATION RATE: 18 BRPM | SYSTOLIC BLOOD PRESSURE: 182 MMHG | TEMPERATURE: 98.5 F | OXYGEN SATURATION: 96 %

## 2021-01-01 VITALS
DIASTOLIC BLOOD PRESSURE: 59 MMHG | HEART RATE: 55 BPM | SYSTOLIC BLOOD PRESSURE: 161 MMHG | TEMPERATURE: 98.6 F | OXYGEN SATURATION: 92 % | RESPIRATION RATE: 17 BRPM

## 2021-01-01 VITALS
TEMPERATURE: 98.4 F | HEIGHT: 67 IN | SYSTOLIC BLOOD PRESSURE: 164 MMHG | OXYGEN SATURATION: 94 % | HEART RATE: 75 BPM | DIASTOLIC BLOOD PRESSURE: 56 MMHG | WEIGHT: 190 LBS | RESPIRATION RATE: 18 BRPM | BODY MASS INDEX: 29.82 KG/M2

## 2021-01-01 VITALS
DIASTOLIC BLOOD PRESSURE: 40 MMHG | OXYGEN SATURATION: 68 % | SYSTOLIC BLOOD PRESSURE: 132 MMHG | RESPIRATION RATE: 14 BRPM

## 2021-01-01 DIAGNOSIS — S72.142A CLOSED FRACTURE OF FEMUR, INTERTROCHANTERIC, LEFT, INITIAL ENCOUNTER (HCC): Primary | ICD-10-CM
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
## 2021-01-01 DIAGNOSIS — I50.9 ACUTE ON CHRONIC CONGESTIVE HEART FAILURE, UNSPECIFIED HEART FAILURE TYPE (HCC): Primary | ICD-10-CM

## 2021-01-01 DIAGNOSIS — G45.9 TIA (TRANSIENT ISCHEMIC ATTACK): Primary | ICD-10-CM

## 2021-01-01 DIAGNOSIS — R53.1 GENERALIZED WEAKNESS: ICD-10-CM

## 2021-01-01 DIAGNOSIS — R07.9 CHEST PAIN, UNSPECIFIED TYPE: Primary | ICD-10-CM

## 2021-01-01 DIAGNOSIS — S72.145A CLOSED NONDISPLACED INTERTROCHANTERIC FRACTURE OF LEFT FEMUR, INITIAL ENCOUNTER (HCC): ICD-10-CM
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
## 2021-01-01 DIAGNOSIS — E16.2 HYPOGLYCEMIA: Primary | ICD-10-CM

## 2021-01-01 DIAGNOSIS — J96.01 ACUTE RESPIRATORY FAILURE WITH HYPOXIA (HCC): ICD-10-CM

## 2021-01-01 DIAGNOSIS — I95.9 HYPOTENSIVE EPISODE: Primary | ICD-10-CM

## 2021-01-01 DIAGNOSIS — K59.00 CONSTIPATION, UNSPECIFIED CONSTIPATION TYPE: ICD-10-CM

## 2021-01-01 DIAGNOSIS — G20 PARKINSON DISEASE (HCC): ICD-10-CM

## 2021-01-01 DIAGNOSIS — R09.89 PULMONARY VASCULAR CONGESTION: ICD-10-CM

## 2021-01-01 DIAGNOSIS — R41.82 ALTERED MENTAL STATUS, UNSPECIFIED ALTERED MENTAL STATUS TYPE: Primary | ICD-10-CM

## 2021-01-01 DIAGNOSIS — S51.812A SKIN TEAR OF LEFT FOREARM WITHOUT COMPLICATION, INITIAL ENCOUNTER: ICD-10-CM

## 2021-01-01 LAB
ACANTHOCYTES: ABNORMAL
ALBUMIN SERPL-MCNC: 3.2 G/DL (ref 3.5–5.2)
ALBUMIN SERPL-MCNC: 3.4 G/DL (ref 3.5–5.2)
ALBUMIN SERPL-MCNC: 3.4 G/DL (ref 3.5–5.2)
ALBUMIN SERPL-MCNC: 3.6 G/DL (ref 3.5–5.2)
ALBUMIN SERPL-MCNC: 3.6 G/DL (ref 3.5–5.2)
ALP BLD-CCNC: 100 U/L (ref 40–129)
ALP BLD-CCNC: 103 U/L (ref 40–129)
ALP BLD-CCNC: 89 U/L (ref 40–129)
ALP BLD-CCNC: 91 U/L (ref 40–129)
ALP BLD-CCNC: 99 U/L (ref 40–129)
ALT SERPL-CCNC: 10 U/L (ref 0–40)
ALT SERPL-CCNC: 17 U/L (ref 0–40)
ALT SERPL-CCNC: 19 U/L (ref 0–40)
ALT SERPL-CCNC: 20 U/L (ref 0–40)
ALT SERPL-CCNC: <5 U/L (ref 0–40)
AMMONIA: 16 UMOL/L (ref 16–60)
ANION GAP SERPL CALCULATED.3IONS-SCNC: 10 MMOL/L (ref 7–16)
ANION GAP SERPL CALCULATED.3IONS-SCNC: 11 MMOL/L (ref 7–16)
ANION GAP SERPL CALCULATED.3IONS-SCNC: 12 MMOL/L (ref 7–16)
ANION GAP SERPL CALCULATED.3IONS-SCNC: 5 MMOL/L (ref 7–16)
ANION GAP SERPL CALCULATED.3IONS-SCNC: 6 MMOL/L (ref 7–16)
ANION GAP SERPL CALCULATED.3IONS-SCNC: 8 MMOL/L (ref 7–16)
ANION GAP SERPL CALCULATED.3IONS-SCNC: 9 MMOL/L (ref 7–16)
ANISOCYTOSIS: ABNORMAL
APTT: 28.3 SEC (ref 24.5–35.1)
APTT: 29.9 SEC (ref 24.5–35.1)
APTT: 30 SEC (ref 24.5–35.1)
AST SERPL-CCNC: 16 U/L (ref 0–39)
AST SERPL-CCNC: 18 U/L (ref 0–39)
AST SERPL-CCNC: 20 U/L (ref 0–39)
AST SERPL-CCNC: 20 U/L (ref 0–39)
AST SERPL-CCNC: 21 U/L (ref 0–39)
BACTERIA: ABNORMAL /HPF
BACTERIA: NORMAL /HPF
BASOPHILIC STIPPLING: ABNORMAL
BASOPHILS ABSOLUTE: 0 E9/L (ref 0–0.2)
BASOPHILS ABSOLUTE: 0.02 E9/L (ref 0–0.2)
BASOPHILS ABSOLUTE: 0.06 E9/L (ref 0–0.2)
BASOPHILS ABSOLUTE: 0.06 E9/L (ref 0–0.2)
BASOPHILS RELATIVE PERCENT: 0.1 % (ref 0–2)
BASOPHILS RELATIVE PERCENT: 0.2 % (ref 0–2)
BASOPHILS RELATIVE PERCENT: 0.2 % (ref 0–2)
BASOPHILS RELATIVE PERCENT: 0.3 % (ref 0–2)
BASOPHILS RELATIVE PERCENT: 0.4 % (ref 0–2)
BASOPHILS RELATIVE PERCENT: 0.9 % (ref 0–2)
BASOPHILS RELATIVE PERCENT: 1 % (ref 0–2)
BILIRUB SERPL-MCNC: 0.5 MG/DL (ref 0–1.2)
BILIRUB SERPL-MCNC: 0.5 MG/DL (ref 0–1.2)
BILIRUB SERPL-MCNC: 0.6 MG/DL (ref 0–1.2)
BILIRUB SERPL-MCNC: 0.6 MG/DL (ref 0–1.2)
BILIRUB SERPL-MCNC: 0.8 MG/DL (ref 0–1.2)
BILIRUBIN DIRECT: 0.3 MG/DL (ref 0–0.3)
BILIRUBIN URINE: NEGATIVE
BILIRUBIN, INDIRECT: 0.5 MG/DL (ref 0–1)
BLOOD CULTURE, ROUTINE: NORMAL
BLOOD CULTURE, ROUTINE: NORMAL
BLOOD, URINE: ABNORMAL
BLOOD, URINE: NEGATIVE
BUN BLDV-MCNC: 20 MG/DL (ref 6–23)
BUN BLDV-MCNC: 24 MG/DL (ref 6–23)
BUN BLDV-MCNC: 24 MG/DL (ref 6–23)
BUN BLDV-MCNC: 25 MG/DL (ref 6–23)
BUN BLDV-MCNC: 26 MG/DL (ref 6–23)
BUN BLDV-MCNC: 27 MG/DL (ref 6–23)
BUN BLDV-MCNC: 27 MG/DL (ref 6–23)
BUN BLDV-MCNC: 28 MG/DL (ref 6–23)
BUN BLDV-MCNC: 29 MG/DL (ref 6–23)
BUN BLDV-MCNC: 29 MG/DL (ref 6–23)
BUN BLDV-MCNC: 32 MG/DL (ref 6–23)
BUN BLDV-MCNC: 34 MG/DL (ref 6–23)
BURR CELLS: ABNORMAL
CALCIUM SERPL-MCNC: 8 MG/DL (ref 8.6–10.2)
CALCIUM SERPL-MCNC: 8.1 MG/DL (ref 8.6–10.2)
CALCIUM SERPL-MCNC: 8.3 MG/DL (ref 8.6–10.2)
CALCIUM SERPL-MCNC: 8.3 MG/DL (ref 8.6–10.2)
CALCIUM SERPL-MCNC: 8.4 MG/DL (ref 8.6–10.2)
CALCIUM SERPL-MCNC: 8.5 MG/DL (ref 8.6–10.2)
CALCIUM SERPL-MCNC: 8.6 MG/DL (ref 8.6–10.2)
CALCIUM SERPL-MCNC: 8.6 MG/DL (ref 8.6–10.2)
CALCIUM SERPL-MCNC: 8.7 MG/DL (ref 8.6–10.2)
CALCIUM SERPL-MCNC: 8.7 MG/DL (ref 8.6–10.2)
CHLORIDE BLD-SCNC: 101 MMOL/L (ref 98–107)
CHLORIDE BLD-SCNC: 102 MMOL/L (ref 98–107)
CHLORIDE BLD-SCNC: 103 MMOL/L (ref 98–107)
CHLORIDE BLD-SCNC: 104 MMOL/L (ref 98–107)
CHLORIDE BLD-SCNC: 105 MMOL/L (ref 98–107)
CHLORIDE BLD-SCNC: 108 MMOL/L (ref 98–107)
CHP ED QC CHECK: NORMAL
CHP ED QC CHECK: YES
CHP ED QC CHECK: YES
CLARITY: CLEAR
CO2: 23 MMOL/L (ref 22–29)
CO2: 23 MMOL/L (ref 22–29)
CO2: 24 MMOL/L (ref 22–29)
CO2: 25 MMOL/L (ref 22–29)
CO2: 25 MMOL/L (ref 22–29)
CO2: 27 MMOL/L (ref 22–29)
CO2: 27 MMOL/L (ref 22–29)
CO2: 28 MMOL/L (ref 22–29)
CO2: 28 MMOL/L (ref 22–29)
CO2: 29 MMOL/L (ref 22–29)
CO2: 30 MMOL/L (ref 22–29)
CO2: 30 MMOL/L (ref 22–29)
COLOR: YELLOW
CREAT SERPL-MCNC: 1.1 MG/DL (ref 0.7–1.2)
CREAT SERPL-MCNC: 1.2 MG/DL (ref 0.7–1.2)
CREAT SERPL-MCNC: 1.3 MG/DL (ref 0.7–1.2)
CREAT SERPL-MCNC: 1.4 MG/DL (ref 0.7–1.2)
CULTURE, BLOOD 2: NORMAL
CULTURE, BLOOD 2: NORMAL
EKG ATRIAL RATE: 267 BPM
EKG ATRIAL RATE: 55 BPM
EKG ATRIAL RATE: 61 BPM
EKG ATRIAL RATE: 64 BPM
EKG ATRIAL RATE: 72 BPM
EKG ATRIAL RATE: 72 BPM
EKG ATRIAL RATE: 76 BPM
EKG ATRIAL RATE: 82 BPM
EKG P AXIS: 48 DEGREES
EKG P AXIS: 49 DEGREES
EKG P AXIS: 56 DEGREES
EKG P AXIS: 58 DEGREES
EKG P AXIS: 70 DEGREES
EKG P AXIS: 73 DEGREES
EKG P AXIS: 75 DEGREES
EKG P-R INTERVAL: 170 MS
EKG P-R INTERVAL: 182 MS
EKG P-R INTERVAL: 186 MS
EKG P-R INTERVAL: 186 MS
EKG P-R INTERVAL: 204 MS
EKG P-R INTERVAL: 224 MS
EKG P-R INTERVAL: 230 MS
EKG Q-T INTERVAL: 406 MS
EKG Q-T INTERVAL: 408 MS
EKG Q-T INTERVAL: 428 MS
EKG Q-T INTERVAL: 428 MS
EKG Q-T INTERVAL: 452 MS
EKG Q-T INTERVAL: 452 MS
EKG Q-T INTERVAL: 466 MS
EKG Q-T INTERVAL: 482 MS
EKG QRS DURATION: 80 MS
EKG QRS DURATION: 82 MS
EKG QRS DURATION: 84 MS
EKG QRS DURATION: 84 MS
EKG QRS DURATION: 86 MS
EKG QRS DURATION: 88 MS
EKG QRS DURATION: 90 MS
EKG QRS DURATION: 92 MS
EKG QTC CALCULATION (BAZETT): 445 MS
EKG QTC CALCULATION (BAZETT): 455 MS
EKG QTC CALCULATION (BAZETT): 455 MS
EKG QTC CALCULATION (BAZETT): 459 MS
EKG QTC CALCULATION (BAZETT): 468 MS
EKG QTC CALCULATION (BAZETT): 468 MS
EKG QTC CALCULATION (BAZETT): 474 MS
EKG QTC CALCULATION (BAZETT): 497 MS
EKG R AXIS: -13 DEGREES
EKG R AXIS: -26 DEGREES
EKG R AXIS: -31 DEGREES
EKG R AXIS: -32 DEGREES
EKG R AXIS: -34 DEGREES
EKG R AXIS: -36 DEGREES
EKG R AXIS: -36 DEGREES
EKG R AXIS: -38 DEGREES
EKG T AXIS: 22 DEGREES
EKG T AXIS: 23 DEGREES
EKG T AXIS: 27 DEGREES
EKG T AXIS: 28 DEGREES
EKG T AXIS: 31 DEGREES
EKG T AXIS: 34 DEGREES
EKG T AXIS: 5 DEGREES
EKG T AXIS: 7 DEGREES
EKG VENTRICULAR RATE: 55 BPM
EKG VENTRICULAR RATE: 61 BPM
EKG VENTRICULAR RATE: 61 BPM
EKG VENTRICULAR RATE: 64 BPM
EKG VENTRICULAR RATE: 72 BPM
EKG VENTRICULAR RATE: 72 BPM
EKG VENTRICULAR RATE: 76 BPM
EKG VENTRICULAR RATE: 82 BPM
EOSINOPHILS ABSOLUTE: 0 E9/L (ref 0.05–0.5)
EOSINOPHILS ABSOLUTE: 0.04 E9/L (ref 0.05–0.5)
EOSINOPHILS ABSOLUTE: 0.06 E9/L (ref 0.05–0.5)
EOSINOPHILS ABSOLUTE: 0.06 E9/L (ref 0.05–0.5)
EOSINOPHILS RELATIVE PERCENT: 0.1 % (ref 0–6)
EOSINOPHILS RELATIVE PERCENT: 0.2 % (ref 0–6)
EOSINOPHILS RELATIVE PERCENT: 0.6 % (ref 0–6)
EOSINOPHILS RELATIVE PERCENT: 0.6 % (ref 0–6)
EOSINOPHILS RELATIVE PERCENT: 0.9 % (ref 0–6)
EOSINOPHILS RELATIVE PERCENT: 1 % (ref 0–6)
EOSINOPHILS RELATIVE PERCENT: 1.1 % (ref 0–6)
EPITHELIAL CELLS, UA: ABNORMAL /HPF
GFR AFRICAN AMERICAN: 58
GFR AFRICAN AMERICAN: >60
GFR NON-AFRICAN AMERICAN: 48 ML/MIN/1.73
GFR NON-AFRICAN AMERICAN: 52 ML/MIN/1.73
GFR NON-AFRICAN AMERICAN: 57 ML/MIN/1.73
GFR NON-AFRICAN AMERICAN: >60 ML/MIN/1.73
GLUCOSE BLD-MCNC: 102 MG/DL (ref 74–99)
GLUCOSE BLD-MCNC: 132 MG/DL
GLUCOSE BLD-MCNC: 132 MG/DL (ref 74–99)
GLUCOSE BLD-MCNC: 137 MG/DL
GLUCOSE BLD-MCNC: 137 MG/DL
GLUCOSE BLD-MCNC: 137 MG/DL (ref 74–99)
GLUCOSE BLD-MCNC: 138 MG/DL (ref 74–99)
GLUCOSE BLD-MCNC: 205 MG/DL (ref 74–99)
GLUCOSE BLD-MCNC: 213 MG/DL (ref 74–99)
GLUCOSE BLD-MCNC: 59 MG/DL (ref 74–99)
GLUCOSE BLD-MCNC: 61 MG/DL (ref 74–99)
GLUCOSE BLD-MCNC: 62 MG/DL (ref 74–99)
GLUCOSE BLD-MCNC: 81 MG/DL (ref 74–99)
GLUCOSE BLD-MCNC: 83 MG/DL (ref 74–99)
GLUCOSE BLD-MCNC: 99 MG/DL (ref 74–99)
GLUCOSE URINE: NEGATIVE MG/DL
HCT VFR BLD CALC: 28.3 % (ref 37–54)
HCT VFR BLD CALC: 29.8 % (ref 37–54)
HCT VFR BLD CALC: 31.2 % (ref 37–54)
HCT VFR BLD CALC: 31.4 % (ref 37–54)
HCT VFR BLD CALC: 31.7 % (ref 37–54)
HCT VFR BLD CALC: 31.9 % (ref 37–54)
HCT VFR BLD CALC: 32.2 % (ref 37–54)
HCT VFR BLD CALC: 32.2 % (ref 37–54)
HCT VFR BLD CALC: 32.4 % (ref 37–54)
HCT VFR BLD CALC: 33.3 % (ref 37–54)
HCT VFR BLD CALC: 34.3 % (ref 37–54)
HCT VFR BLD CALC: 35.3 % (ref 37–54)
HCT VFR BLD CALC: 38.3 % (ref 37–54)
HEMOGLOBIN: 10 G/DL (ref 12.5–16.5)
HEMOGLOBIN: 10 G/DL (ref 12.5–16.5)
HEMOGLOBIN: 10.1 G/DL (ref 12.5–16.5)
HEMOGLOBIN: 10.2 G/DL (ref 12.5–16.5)
HEMOGLOBIN: 10.4 G/DL (ref 12.5–16.5)
HEMOGLOBIN: 10.4 G/DL (ref 12.5–16.5)
HEMOGLOBIN: 10.5 G/DL (ref 12.5–16.5)
HEMOGLOBIN: 10.8 G/DL (ref 12.5–16.5)
HEMOGLOBIN: 10.9 G/DL (ref 12.5–16.5)
HEMOGLOBIN: 12.2 G/DL (ref 12.5–16.5)
HEMOGLOBIN: 9 G/DL (ref 12.5–16.5)
HEMOGLOBIN: 9.4 G/DL (ref 12.5–16.5)
HEMOGLOBIN: 9.9 G/DL (ref 12.5–16.5)
HYPOCHROMIA: ABNORMAL
IMMATURE GRANULOCYTES #: 0.03 E9/L
IMMATURE GRANULOCYTES %: 0.4 % (ref 0–5)
INR BLD: 1
INR BLD: 1
KETONES, URINE: NEGATIVE MG/DL
LACTIC ACID, SEPSIS: 1.3 MMOL/L (ref 0.5–1.9)
LACTIC ACID, SEPSIS: 3.1 MMOL/L (ref 0.5–1.9)
LACTIC ACID: 1.3 MMOL/L (ref 0.5–2.2)
LACTIC ACID: 2.6 MMOL/L (ref 0.5–2.2)
LEUKOCYTE ESTERASE, URINE: NEGATIVE
LIPASE: 47 U/L (ref 13–60)
LV EF: 65 %
LVEF MODALITY: NORMAL
LYMPHOCYTES ABSOLUTE: 0.51 E9/L (ref 1.5–4)
LYMPHOCYTES ABSOLUTE: 0.85 E9/L (ref 1.5–4)
LYMPHOCYTES ABSOLUTE: 1.02 E9/L (ref 1.5–4)
LYMPHOCYTES ABSOLUTE: 1.02 E9/L (ref 1.5–4)
LYMPHOCYTES ABSOLUTE: 1.22 E9/L (ref 1.5–4)
LYMPHOCYTES ABSOLUTE: 1.4 E9/L (ref 1.5–4)
LYMPHOCYTES ABSOLUTE: 1.43 E9/L (ref 1.5–4)
LYMPHOCYTES RELATIVE PERCENT: 12.2 % (ref 20–42)
LYMPHOCYTES RELATIVE PERCENT: 15.7 % (ref 20–42)
LYMPHOCYTES RELATIVE PERCENT: 16.9 % (ref 20–42)
LYMPHOCYTES RELATIVE PERCENT: 18.3 % (ref 20–42)
LYMPHOCYTES RELATIVE PERCENT: 23 % (ref 20–42)
LYMPHOCYTES RELATIVE PERCENT: 5.2 % (ref 20–42)
LYMPHOCYTES RELATIVE PERCENT: 9.7 % (ref 20–42)
MCH RBC QN AUTO: 32.9 PG (ref 26–35)
MCH RBC QN AUTO: 33 PG (ref 26–35)
MCH RBC QN AUTO: 33.2 PG (ref 26–35)
MCH RBC QN AUTO: 33.2 PG (ref 26–35)
MCH RBC QN AUTO: 33.3 PG (ref 26–35)
MCH RBC QN AUTO: 33.3 PG (ref 26–35)
MCH RBC QN AUTO: 33.4 PG (ref 26–35)
MCH RBC QN AUTO: 33.6 PG (ref 26–35)
MCH RBC QN AUTO: 33.7 PG (ref 26–35)
MCH RBC QN AUTO: 33.8 PG (ref 26–35)
MCH RBC QN AUTO: 34.1 PG (ref 26–35)
MCH RBC QN AUTO: 34.4 PG (ref 26–35)
MCH RBC QN AUTO: 34.8 PG (ref 26–35)
MCHC RBC AUTO-ENTMCNC: 30.9 % (ref 32–34.5)
MCHC RBC AUTO-ENTMCNC: 31.2 % (ref 32–34.5)
MCHC RBC AUTO-ENTMCNC: 31.3 % (ref 32–34.5)
MCHC RBC AUTO-ENTMCNC: 31.5 % (ref 32–34.5)
MCHC RBC AUTO-ENTMCNC: 31.7 % (ref 32–34.5)
MCHC RBC AUTO-ENTMCNC: 31.8 % (ref 32–34.5)
MCHC RBC AUTO-ENTMCNC: 31.9 % (ref 32–34.5)
MCHC RBC AUTO-ENTMCNC: 31.9 % (ref 32–34.5)
MCHC RBC AUTO-ENTMCNC: 32.1 % (ref 32–34.5)
MCHC RBC AUTO-ENTMCNC: 32.1 % (ref 32–34.5)
MCHC RBC AUTO-ENTMCNC: 32.6 % (ref 32–34.5)
MCV RBC AUTO: 104.4 FL (ref 80–99.9)
MCV RBC AUTO: 105 FL (ref 80–99.9)
MCV RBC AUTO: 105.1 FL (ref 80–99.9)
MCV RBC AUTO: 105.5 FL (ref 80–99.9)
MCV RBC AUTO: 105.7 FL (ref 80–99.9)
MCV RBC AUTO: 105.7 FL (ref 80–99.9)
MCV RBC AUTO: 105.9 FL (ref 80–99.9)
MCV RBC AUTO: 106.3 FL (ref 80–99.9)
MCV RBC AUTO: 106.4 FL (ref 80–99.9)
MCV RBC AUTO: 106.6 FL (ref 80–99.9)
MCV RBC AUTO: 106.6 FL (ref 80–99.9)
MCV RBC AUTO: 107.3 FL (ref 80–99.9)
MCV RBC AUTO: 108.3 FL (ref 80–99.9)
METER GLUCOSE: 101 MG/DL (ref 74–99)
METER GLUCOSE: 102 MG/DL (ref 74–99)
METER GLUCOSE: 103 MG/DL (ref 74–99)
METER GLUCOSE: 113 MG/DL (ref 74–99)
METER GLUCOSE: 116 MG/DL (ref 74–99)
METER GLUCOSE: 125 MG/DL (ref 74–99)
METER GLUCOSE: 125 MG/DL (ref 74–99)
METER GLUCOSE: 130 MG/DL (ref 74–99)
METER GLUCOSE: 137 MG/DL (ref 74–99)
METER GLUCOSE: 137 MG/DL (ref 74–99)
METER GLUCOSE: 147 MG/DL (ref 74–99)
METER GLUCOSE: 149 MG/DL (ref 74–99)
METER GLUCOSE: 154 MG/DL (ref 74–99)
METER GLUCOSE: 160 MG/DL (ref 74–99)
METER GLUCOSE: 165 MG/DL (ref 74–99)
METER GLUCOSE: 166 MG/DL (ref 74–99)
METER GLUCOSE: 169 MG/DL (ref 74–99)
METER GLUCOSE: 173 MG/DL (ref 74–99)
METER GLUCOSE: 175 MG/DL (ref 74–99)
METER GLUCOSE: 176 MG/DL (ref 74–99)
METER GLUCOSE: 178 MG/DL (ref 74–99)
METER GLUCOSE: 181 MG/DL (ref 74–99)
METER GLUCOSE: 183 MG/DL (ref 74–99)
METER GLUCOSE: 188 MG/DL (ref 74–99)
METER GLUCOSE: 192 MG/DL (ref 74–99)
METER GLUCOSE: 208 MG/DL (ref 74–99)
METER GLUCOSE: 212 MG/DL (ref 74–99)
METER GLUCOSE: 230 MG/DL (ref 74–99)
METER GLUCOSE: 235 MG/DL (ref 74–99)
METER GLUCOSE: 262 MG/DL (ref 74–99)
METER GLUCOSE: 279 MG/DL (ref 74–99)
METER GLUCOSE: 56 MG/DL (ref 74–99)
METER GLUCOSE: 62 MG/DL (ref 74–99)
METER GLUCOSE: 64 MG/DL (ref 74–99)
METER GLUCOSE: 72 MG/DL (ref 74–99)
METER GLUCOSE: 77 MG/DL (ref 74–99)
METER GLUCOSE: 89 MG/DL (ref 74–99)
METER GLUCOSE: 94 MG/DL (ref 74–99)
METER GLUCOSE: 95 MG/DL (ref 74–99)
METER GLUCOSE: 99 MG/DL (ref 74–99)
METER GLUCOSE: <40 MG/DL (ref 74–99)
MONOCYTES ABSOLUTE: 0.17 E9/L (ref 0.1–0.95)
MONOCYTES ABSOLUTE: 0.31 E9/L (ref 0.1–0.95)
MONOCYTES ABSOLUTE: 0.31 E9/L (ref 0.1–0.95)
MONOCYTES ABSOLUTE: 0.42 E9/L (ref 0.1–0.95)
MONOCYTES ABSOLUTE: 0.61 E9/L (ref 0.1–0.95)
MONOCYTES ABSOLUTE: 0.62 E9/L (ref 0.1–0.95)
MONOCYTES ABSOLUTE: 0.64 E9/L (ref 0.1–0.95)
MONOCYTES RELATIVE PERCENT: 1.8 % (ref 2–12)
MONOCYTES RELATIVE PERCENT: 4.3 % (ref 2–12)
MONOCYTES RELATIVE PERCENT: 5 % (ref 2–12)
MONOCYTES RELATIVE PERCENT: 5.2 % (ref 2–12)
MONOCYTES RELATIVE PERCENT: 6.1 % (ref 2–12)
MONOCYTES RELATIVE PERCENT: 8.6 % (ref 2–12)
MONOCYTES RELATIVE PERCENT: 9.6 % (ref 2–12)
NEUTROPHILS ABSOLUTE: 4.34 E9/L (ref 1.8–7.3)
NEUTROPHILS ABSOLUTE: 4.67 E9/L (ref 1.8–7.3)
NEUTROPHILS ABSOLUTE: 5.29 E9/L (ref 1.8–7.3)
NEUTROPHILS ABSOLUTE: 6.01 E9/L (ref 1.8–7.3)
NEUTROPHILS ABSOLUTE: 7.06 E9/L (ref 1.8–7.3)
NEUTROPHILS ABSOLUTE: 7.57 E9/L (ref 1.8–7.3)
NEUTROPHILS ABSOLUTE: 9.08 E9/L (ref 1.8–7.3)
NEUTROPHILS RELATIVE PERCENT: 70 % (ref 43–80)
NEUTROPHILS RELATIVE PERCENT: 73 % (ref 43–80)
NEUTROPHILS RELATIVE PERCENT: 73.2 % (ref 43–80)
NEUTROPHILS RELATIVE PERCENT: 77.4 % (ref 43–80)
NEUTROPHILS RELATIVE PERCENT: 82.6 % (ref 43–80)
NEUTROPHILS RELATIVE PERCENT: 88.5 % (ref 43–80)
NEUTROPHILS RELATIVE PERCENT: 88.7 % (ref 43–80)
NITRITE, URINE: NEGATIVE
OVALOCYTES: ABNORMAL
OVALOCYTES: ABNORMAL
PDW BLD-RTO: 13 FL (ref 11.5–15)
PDW BLD-RTO: 13.1 FL (ref 11.5–15)
PDW BLD-RTO: 13.2 FL (ref 11.5–15)
PDW BLD-RTO: 13.8 FL (ref 11.5–15)
PDW BLD-RTO: 14 FL (ref 11.5–15)
PDW BLD-RTO: 14 FL (ref 11.5–15)
PDW BLD-RTO: 15.6 FL (ref 11.5–15)
PDW BLD-RTO: 15.9 FL (ref 11.5–15)
PDW BLD-RTO: 15.9 FL (ref 11.5–15)
PDW BLD-RTO: 16.1 FL (ref 11.5–15)
PDW BLD-RTO: 16.2 FL (ref 11.5–15)
PH UA: 5 (ref 5–9)
PH UA: 6 (ref 5–9)
PLATELET # BLD: 114 E9/L (ref 130–450)
PLATELET # BLD: 142 E9/L (ref 130–450)
PLATELET # BLD: 143 E9/L (ref 130–450)
PLATELET # BLD: 145 E9/L (ref 130–450)
PLATELET # BLD: 145 E9/L (ref 130–450)
PLATELET # BLD: 150 E9/L (ref 130–450)
PLATELET # BLD: 153 E9/L (ref 130–450)
PLATELET # BLD: 156 E9/L (ref 130–450)
PLATELET # BLD: 156 E9/L (ref 130–450)
PLATELET # BLD: 161 E9/L (ref 130–450)
PLATELET # BLD: 161 E9/L (ref 130–450)
PLATELET # BLD: 165 E9/L (ref 130–450)
PLATELET # BLD: 96 E9/L (ref 130–450)
PLATELET CONFIRMATION: NORMAL
PMV BLD AUTO: 10.2 FL (ref 7–12)
PMV BLD AUTO: 10.3 FL (ref 7–12)
PMV BLD AUTO: 10.5 FL (ref 7–12)
PMV BLD AUTO: 10.6 FL (ref 7–12)
PMV BLD AUTO: 10.7 FL (ref 7–12)
PMV BLD AUTO: 10.8 FL (ref 7–12)
PMV BLD AUTO: 11 FL (ref 7–12)
PMV BLD AUTO: 11 FL (ref 7–12)
POIKILOCYTES: ABNORMAL
POLYCHROMASIA: ABNORMAL
POTASSIUM REFLEX MAGNESIUM: 4.1 MMOL/L (ref 3.5–5)
POTASSIUM REFLEX MAGNESIUM: 4.7 MMOL/L (ref 3.5–5)
POTASSIUM REFLEX MAGNESIUM: 4.7 MMOL/L (ref 3.5–5)
POTASSIUM REFLEX MAGNESIUM: 4.9 MMOL/L (ref 3.5–5)
POTASSIUM REFLEX MAGNESIUM: 5 MMOL/L (ref 3.5–5)
POTASSIUM SERPL-SCNC: 4 MMOL/L (ref 3.5–5)
POTASSIUM SERPL-SCNC: 4.3 MMOL/L (ref 3.5–5)
POTASSIUM SERPL-SCNC: 4.3 MMOL/L (ref 3.5–5)
POTASSIUM SERPL-SCNC: 4.4 MMOL/L (ref 3.5–5)
POTASSIUM SERPL-SCNC: 4.5 MMOL/L (ref 3.5–5)
POTASSIUM SERPL-SCNC: 4.7 MMOL/L (ref 3.5–5)
POTASSIUM SERPL-SCNC: 5 MMOL/L (ref 3.5–5)
PRO-BNP: 1561 PG/ML (ref 0–450)
PRO-BNP: 1640 PG/ML (ref 0–450)
PRO-BNP: 296 PG/ML (ref 0–450)
PRO-BNP: 769 PG/ML (ref 0–450)
PROTEIN UA: 100 MG/DL
PROTEIN UA: NORMAL MG/DL
PROTHROMBIN TIME: 11.6 SEC (ref 9.3–12.4)
PROTHROMBIN TIME: 11.9 SEC (ref 9.3–12.4)
RBC # BLD: 2.66 E12/L (ref 3.8–5.8)
RBC # BLD: 2.82 E12/L (ref 3.8–5.8)
RBC # BLD: 2.9 E12/L (ref 3.8–5.8)
RBC # BLD: 2.97 E12/L (ref 3.8–5.8)
RBC # BLD: 3 E12/L (ref 3.8–5.8)
RBC # BLD: 3.02 E12/L (ref 3.8–5.8)
RBC # BLD: 3.04 E12/L (ref 3.8–5.8)
RBC # BLD: 3.15 E12/L (ref 3.8–5.8)
RBC # BLD: 3.25 E12/L (ref 3.8–5.8)
RBC # BLD: 3.31 E12/L (ref 3.8–5.8)
RBC # BLD: 3.67 E12/L (ref 3.8–5.8)
RBC UA: ABNORMAL /HPF (ref 0–2)
RBC UA: NORMAL /HPF (ref 0–2)
SARS-COV-2, NAAT: NOT DETECTED
SARS-COV-2, NAAT: NOT DETECTED
SODIUM BLD-SCNC: 135 MMOL/L (ref 132–146)
SODIUM BLD-SCNC: 137 MMOL/L (ref 132–146)
SODIUM BLD-SCNC: 137 MMOL/L (ref 132–146)
SODIUM BLD-SCNC: 138 MMOL/L (ref 132–146)
SODIUM BLD-SCNC: 138 MMOL/L (ref 132–146)
SODIUM BLD-SCNC: 139 MMOL/L (ref 132–146)
SODIUM BLD-SCNC: 140 MMOL/L (ref 132–146)
SODIUM BLD-SCNC: 141 MMOL/L (ref 132–146)
SPECIFIC GRAVITY UA: 1.01 (ref 1–1.03)
SPECIFIC GRAVITY UA: 1.01 (ref 1–1.03)
SPECIFIC GRAVITY UA: 1.02 (ref 1–1.03)
SPECIFIC GRAVITY UA: >=1.03 (ref 1–1.03)
T4 TOTAL: 4.9 MCG/DL (ref 4.5–11.7)
TOTAL PROTEIN: 5.6 G/DL (ref 6.4–8.3)
TOTAL PROTEIN: 6 G/DL (ref 6.4–8.3)
TOTAL PROTEIN: 6.1 G/DL (ref 6.4–8.3)
TOTAL PROTEIN: 6.3 G/DL (ref 6.4–8.3)
TOTAL PROTEIN: 6.3 G/DL (ref 6.4–8.3)
TROPONIN, HIGH SENSITIVITY: 24 NG/L (ref 0–11)
TROPONIN, HIGH SENSITIVITY: 24 NG/L (ref 0–11)
TROPONIN, HIGH SENSITIVITY: 25 NG/L (ref 0–11)
TROPONIN, HIGH SENSITIVITY: 25 NG/L (ref 0–11)
TROPONIN, HIGH SENSITIVITY: 26 NG/L (ref 0–11)
TROPONIN, HIGH SENSITIVITY: 27 NG/L (ref 0–11)
TROPONIN, HIGH SENSITIVITY: 28 NG/L (ref 0–11)
TROPONIN, HIGH SENSITIVITY: 32 NG/L (ref 0–11)
TROPONIN, HIGH SENSITIVITY: 34 NG/L (ref 0–11)
TROPONIN, HIGH SENSITIVITY: 38 NG/L (ref 0–11)
TSH SERPL DL<=0.05 MIU/L-ACNC: 1.75 UIU/ML (ref 0.27–4.2)
URINE CULTURE, ROUTINE: NORMAL
UROBILINOGEN, URINE: 0.2 E.U./DL
UROBILINOGEN, URINE: 1 E.U./DL
UROBILINOGEN, URINE: 2 E.U./DL
UROBILINOGEN, URINE: 2 E.U./DL
WBC # BLD: 10.2 E9/L (ref 4.5–11.5)
WBC # BLD: 5.7 E9/L (ref 4.5–11.5)
WBC # BLD: 6 E9/L (ref 4.5–11.5)
WBC # BLD: 6.1 E9/L (ref 4.5–11.5)
WBC # BLD: 6.2 E9/L (ref 4.5–11.5)
WBC # BLD: 6.4 E9/L (ref 4.5–11.5)
WBC # BLD: 6.5 E9/L (ref 4.5–11.5)
WBC # BLD: 6.8 E9/L (ref 4.5–11.5)
WBC # BLD: 7.1 E9/L (ref 4.5–11.5)
WBC # BLD: 7.2 E9/L (ref 4.5–11.5)
WBC # BLD: 7.8 E9/L (ref 4.5–11.5)
WBC # BLD: 8.5 E9/L (ref 4.5–11.5)
WBC # BLD: 8.5 E9/L (ref 4.5–11.5)
WBC UA: ABNORMAL /HPF (ref 0–5)
WBC UA: NORMAL /HPF (ref 0–5)

## 2021-01-01 PROCEDURE — 82962 GLUCOSE BLOOD TEST: CPT

## 2021-01-01 PROCEDURE — 85025 COMPLETE CBC W/AUTO DIFF WBC: CPT

## 2021-01-01 PROCEDURE — 80048 BASIC METABOLIC PNL TOTAL CA: CPT

## 2021-01-01 PROCEDURE — 87635 SARS-COV-2 COVID-19 AMP PRB: CPT

## 2021-01-01 PROCEDURE — 70450 CT HEAD/BRAIN W/O DYE: CPT

## 2021-01-01 PROCEDURE — 6360000002 HC RX W HCPCS: Performed by: STUDENT IN AN ORGANIZED HEALTH CARE EDUCATION/TRAINING PROGRAM

## 2021-01-01 PROCEDURE — 99222 1ST HOSP IP/OBS MODERATE 55: CPT | Performed by: INTERNAL MEDICINE

## 2021-01-01 PROCEDURE — 83605 ASSAY OF LACTIC ACID: CPT

## 2021-01-01 PROCEDURE — 6370000000 HC RX 637 (ALT 250 FOR IP): Performed by: STUDENT IN AN ORGANIZED HEALTH CARE EDUCATION/TRAINING PROGRAM

## 2021-01-01 PROCEDURE — 93005 ELECTROCARDIOGRAM TRACING: CPT | Performed by: EMERGENCY MEDICINE

## 2021-01-01 PROCEDURE — 36415 COLL VENOUS BLD VENIPUNCTURE: CPT

## 2021-01-01 PROCEDURE — 85027 COMPLETE CBC AUTOMATED: CPT

## 2021-01-01 PROCEDURE — 83880 ASSAY OF NATRIURETIC PEPTIDE: CPT

## 2021-01-01 PROCEDURE — 87088 URINE BACTERIA CULTURE: CPT

## 2021-01-01 PROCEDURE — 71045 X-RAY EXAM CHEST 1 VIEW: CPT

## 2021-01-01 PROCEDURE — 7100000001 HC PACU RECOVERY - ADDTL 15 MIN: Performed by: ORTHOPAEDIC SURGERY

## 2021-01-01 PROCEDURE — 97530 THERAPEUTIC ACTIVITIES: CPT | Performed by: PHYSICAL THERAPIST

## 2021-01-01 PROCEDURE — 99285 EMERGENCY DEPT VISIT HI MDM: CPT

## 2021-01-01 PROCEDURE — 74177 CT ABD & PELVIS W/CONTRAST: CPT

## 2021-01-01 PROCEDURE — 3700000001 HC ADD 15 MINUTES (ANESTHESIA): Performed by: ORTHOPAEDIC SURGERY

## 2021-01-01 PROCEDURE — 6370000000 HC RX 637 (ALT 250 FOR IP): Performed by: INTERNAL MEDICINE

## 2021-01-01 PROCEDURE — 99232 SBSQ HOSP IP/OBS MODERATE 35: CPT | Performed by: STUDENT IN AN ORGANIZED HEALTH CARE EDUCATION/TRAINING PROGRAM

## 2021-01-01 PROCEDURE — 93005 ELECTROCARDIOGRAM TRACING: CPT | Performed by: STUDENT IN AN ORGANIZED HEALTH CARE EDUCATION/TRAINING PROGRAM

## 2021-01-01 PROCEDURE — 84484 ASSAY OF TROPONIN QUANT: CPT

## 2021-01-01 PROCEDURE — 94640 AIRWAY INHALATION TREATMENT: CPT

## 2021-01-01 PROCEDURE — 80053 COMPREHEN METABOLIC PANEL: CPT

## 2021-01-01 PROCEDURE — 3600000004 HC SURGERY LEVEL 4 BASE: Performed by: ORTHOPAEDIC SURGERY

## 2021-01-01 PROCEDURE — 2580000003 HC RX 258: Performed by: STUDENT IN AN ORGANIZED HEALTH CARE EDUCATION/TRAINING PROGRAM

## 2021-01-01 PROCEDURE — 6360000004 HC RX CONTRAST MEDICATION: Performed by: INTERNAL MEDICINE

## 2021-01-01 PROCEDURE — 6360000002 HC RX W HCPCS: Performed by: INTERNAL MEDICINE

## 2021-01-01 PROCEDURE — 2700000000 HC OXYGEN THERAPY PER DAY

## 2021-01-01 PROCEDURE — 7100000000 HC PACU RECOVERY - FIRST 15 MIN: Performed by: ORTHOPAEDIC SURGERY

## 2021-01-01 PROCEDURE — 84443 ASSAY THYROID STIM HORMONE: CPT

## 2021-01-01 PROCEDURE — 81001 URINALYSIS AUTO W/SCOPE: CPT

## 2021-01-01 PROCEDURE — 96374 THER/PROPH/DIAG INJ IV PUSH: CPT

## 2021-01-01 PROCEDURE — 3600000014 HC SURGERY LEVEL 4 ADDTL 15MIN: Performed by: ORTHOPAEDIC SURGERY

## 2021-01-01 PROCEDURE — 99284 EMERGENCY DEPT VISIT MOD MDM: CPT

## 2021-01-01 PROCEDURE — 6370000000 HC RX 637 (ALT 250 FOR IP): Performed by: FAMILY MEDICINE

## 2021-01-01 PROCEDURE — 85610 PROTHROMBIN TIME: CPT

## 2021-01-01 PROCEDURE — 87040 BLOOD CULTURE FOR BACTERIA: CPT

## 2021-01-01 PROCEDURE — 27245 TREAT THIGH FRACTURE: CPT | Performed by: ORTHOPAEDIC SURGERY

## 2021-01-01 PROCEDURE — 1200000000 HC SEMI PRIVATE

## 2021-01-01 PROCEDURE — 6360000002 HC RX W HCPCS: Performed by: FAMILY MEDICINE

## 2021-01-01 PROCEDURE — 73502 X-RAY EXAM HIP UNI 2-3 VIEWS: CPT

## 2021-01-01 PROCEDURE — 3209999900 FLUORO FOR SURGICAL PROCEDURES

## 2021-01-01 PROCEDURE — 97165 OT EVAL LOW COMPLEX 30 MIN: CPT

## 2021-01-01 PROCEDURE — 2500000003 HC RX 250 WO HCPCS: Performed by: STUDENT IN AN ORGANIZED HEALTH CARE EDUCATION/TRAINING PROGRAM

## 2021-01-01 PROCEDURE — 6370000000 HC RX 637 (ALT 250 FOR IP): Performed by: EMERGENCY MEDICINE

## 2021-01-01 PROCEDURE — 6360000002 HC RX W HCPCS: Performed by: NURSE ANESTHETIST, CERTIFIED REGISTERED

## 2021-01-01 PROCEDURE — 85730 THROMBOPLASTIN TIME PARTIAL: CPT

## 2021-01-01 PROCEDURE — 2720000010 HC SURG SUPPLY STERILE: Performed by: ORTHOPAEDIC SURGERY

## 2021-01-01 PROCEDURE — C8929 TTE W OR WO FOL WCON,DOPPLER: HCPCS

## 2021-01-01 PROCEDURE — 97161 PT EVAL LOW COMPLEX 20 MIN: CPT | Performed by: PHYSICAL THERAPIST

## 2021-01-01 PROCEDURE — 83690 ASSAY OF LIPASE: CPT

## 2021-01-01 PROCEDURE — 97110 THERAPEUTIC EXERCISES: CPT

## 2021-01-01 PROCEDURE — 72125 CT NECK SPINE W/O DYE: CPT

## 2021-01-01 PROCEDURE — 80076 HEPATIC FUNCTION PANEL: CPT

## 2021-01-01 PROCEDURE — 82140 ASSAY OF AMMONIA: CPT

## 2021-01-01 PROCEDURE — 2580000003 HC RX 258: Performed by: INTERNAL MEDICINE

## 2021-01-01 PROCEDURE — 94664 DEMO&/EVAL PT USE INHALER: CPT

## 2021-01-01 PROCEDURE — 2060000000 HC ICU INTERMEDIATE R&B

## 2021-01-01 PROCEDURE — 93010 ELECTROCARDIOGRAM REPORT: CPT | Performed by: INTERNAL MEDICINE

## 2021-01-01 PROCEDURE — 2709999900 HC NON-CHARGEABLE SUPPLY: Performed by: ORTHOPAEDIC SURGERY

## 2021-01-01 PROCEDURE — 73552 X-RAY EXAM OF FEMUR 2/>: CPT

## 2021-01-01 PROCEDURE — 99231 SBSQ HOSP IP/OBS SF/LOW 25: CPT | Performed by: ORTHOPAEDIC SURGERY

## 2021-01-01 PROCEDURE — C1713 ANCHOR/SCREW BN/BN,TIS/BN: HCPCS | Performed by: ORTHOPAEDIC SURGERY

## 2021-01-01 PROCEDURE — 97530 THERAPEUTIC ACTIVITIES: CPT

## 2021-01-01 PROCEDURE — 84436 ASSAY OF TOTAL THYROXINE: CPT

## 2021-01-01 PROCEDURE — 3700000000 HC ANESTHESIA ATTENDED CARE: Performed by: ORTHOPAEDIC SURGERY

## 2021-01-01 PROCEDURE — 6360000002 HC RX W HCPCS: Performed by: EMERGENCY MEDICINE

## 2021-01-01 PROCEDURE — 2580000003 HC RX 258: Performed by: EMERGENCY MEDICINE

## 2021-01-01 PROCEDURE — 0QH734Z INSERTION OF INTERNAL FIXATION DEVICE INTO LEFT UPPER FEMUR, PERCUTANEOUS APPROACH: ICD-10-PCS | Performed by: ORTHOPAEDIC SURGERY

## 2021-01-01 PROCEDURE — 2580000003 HC RX 258

## 2021-01-01 PROCEDURE — 2580000003 HC RX 258: Performed by: NURSE ANESTHETIST, CERTIFIED REGISTERED

## 2021-01-01 PROCEDURE — 6360000004 HC RX CONTRAST MEDICATION: Performed by: RADIOLOGY

## 2021-01-01 DEVICE — LAG SCREW, TI
Type: IMPLANTABLE DEVICE | Site: HIP | Status: FUNCTIONAL
Brand: GAMMA

## 2021-01-01 DEVICE — LOCKING SCREW, FULLY THREADED: Type: IMPLANTABLE DEVICE | Site: HIP | Status: FUNCTIONAL

## 2021-01-01 DEVICE — TROCHANTERIC NAIL KIT, TI
Type: IMPLANTABLE DEVICE | Site: HIP | Status: FUNCTIONAL
Brand: GAMMA

## 2021-01-01 RX ORDER — DEXTROSE MONOHYDRATE 50 MG/ML
100 INJECTION, SOLUTION INTRAVENOUS PRN
Status: CANCELLED | OUTPATIENT
Start: 2021-01-01

## 2021-01-01 RX ORDER — LORAZEPAM 2 MG/ML
0.5 INJECTION INTRAMUSCULAR ONCE
Status: COMPLETED | OUTPATIENT
Start: 2021-01-01 | End: 2021-01-01

## 2021-01-01 RX ORDER — ASPIRIN 81 MG/1
324 TABLET, CHEWABLE ORAL ONCE
Status: COMPLETED | OUTPATIENT
Start: 2021-01-01 | End: 2021-01-01

## 2021-01-01 RX ORDER — FLUTICASONE PROPIONATE 50 MCG
1 SPRAY, SUSPENSION (ML) NASAL DAILY
Status: DISCONTINUED | OUTPATIENT
Start: 2021-01-01 | End: 2021-01-01

## 2021-01-01 RX ORDER — FLUTICASONE PROPIONATE 50 MCG
2 SPRAY, SUSPENSION (ML) NASAL DAILY
COMMUNITY

## 2021-01-01 RX ORDER — OXYCODONE HYDROCHLORIDE AND ACETAMINOPHEN 5; 325 MG/1; MG/1
2 TABLET ORAL EVERY 4 HOURS PRN
Status: DISCONTINUED | OUTPATIENT
Start: 2021-01-01 | End: 2021-01-01 | Stop reason: HOSPADM

## 2021-01-01 RX ORDER — SODIUM CHLORIDE 0.9 % (FLUSH) 0.9 %
5-40 SYRINGE (ML) INJECTION EVERY 12 HOURS SCHEDULED
Status: DISCONTINUED | OUTPATIENT
Start: 2021-01-01 | End: 2021-01-01 | Stop reason: SDUPTHER

## 2021-01-01 RX ORDER — SODIUM CHLORIDE 0.9 % (FLUSH) 0.9 %
5-40 SYRINGE (ML) INJECTION PRN
Status: DISCONTINUED | OUTPATIENT
Start: 2021-01-01 | End: 2021-01-01 | Stop reason: SDUPTHER

## 2021-01-01 RX ORDER — FLUTICASONE PROPIONATE 50 MCG
2 SPRAY, SUSPENSION (ML) NASAL DAILY
Status: DISCONTINUED | OUTPATIENT
Start: 2021-01-01 | End: 2021-01-01 | Stop reason: HOSPADM

## 2021-01-01 RX ORDER — FUROSEMIDE 10 MG/ML
20 INJECTION INTRAMUSCULAR; INTRAVENOUS DAILY
Status: DISCONTINUED | OUTPATIENT
Start: 2021-01-01 | End: 2021-01-01 | Stop reason: HOSPADM

## 2021-01-01 RX ORDER — DAPSONE 25 MG/1
50 TABLET ORAL
Status: DISCONTINUED | OUTPATIENT
Start: 2021-01-01 | End: 2021-01-01 | Stop reason: HOSPADM

## 2021-01-01 RX ORDER — DAPSONE 25 MG/1
50 TABLET ORAL
COMMUNITY

## 2021-01-01 RX ORDER — CARVEDILOL 6.25 MG/1
6.25 TABLET ORAL 2 TIMES DAILY WITH MEALS
Status: DISCONTINUED | OUTPATIENT
Start: 2021-01-01 | End: 2021-01-01 | Stop reason: HOSPADM

## 2021-01-01 RX ORDER — FUROSEMIDE 10 MG/ML
40 INJECTION INTRAMUSCULAR; INTRAVENOUS ONCE
Status: COMPLETED | OUTPATIENT
Start: 2021-01-01 | End: 2021-01-01

## 2021-01-01 RX ORDER — 0.9 % SODIUM CHLORIDE 0.9 %
250 INTRAVENOUS SOLUTION INTRAVENOUS ONCE
Status: COMPLETED | OUTPATIENT
Start: 2021-01-01 | End: 2021-01-01

## 2021-01-01 RX ORDER — ALBUTEROL SULFATE 2.5 MG/3ML
2.5 SOLUTION RESPIRATORY (INHALATION) EVERY 6 HOURS PRN
Status: CANCELLED | OUTPATIENT
Start: 2021-01-01

## 2021-01-01 RX ORDER — FLUOXETINE 10 MG/1
10 TABLET, FILM COATED ORAL NIGHTLY
Status: DISCONTINUED | OUTPATIENT
Start: 2021-01-01 | End: 2021-01-01 | Stop reason: CLARIF

## 2021-01-01 RX ORDER — DAPSONE 25 MG/1
25 TABLET ORAL NIGHTLY
Status: DISCONTINUED | OUTPATIENT
Start: 2021-01-01 | End: 2021-01-01 | Stop reason: HOSPADM

## 2021-01-01 RX ORDER — FINASTERIDE 5 MG/1
5 TABLET, FILM COATED ORAL DAILY
Status: CANCELLED | OUTPATIENT
Start: 2021-01-01

## 2021-01-01 RX ORDER — FLUOXETINE 10 MG/1
10 CAPSULE ORAL NIGHTLY
Status: DISCONTINUED | OUTPATIENT
Start: 2021-01-01 | End: 2021-01-01 | Stop reason: HOSPADM

## 2021-01-01 RX ORDER — SPIRONOLACTONE 25 MG/1
25 TABLET ORAL DAILY
Qty: 30 TABLET | Refills: 3 | Status: SHIPPED | OUTPATIENT
Start: 2021-01-01

## 2021-01-01 RX ORDER — EPHEDRINE SULFATE 50 MG/ML
INJECTION INTRAVENOUS PRN
Status: DISCONTINUED | OUTPATIENT
Start: 2021-01-01 | End: 2021-01-01 | Stop reason: SDUPTHER

## 2021-01-01 RX ORDER — DAPSONE 25 MG/1
25 TABLET ORAL DAILY
Status: DISCONTINUED | OUTPATIENT
Start: 2021-01-01 | End: 2021-01-01 | Stop reason: HOSPADM

## 2021-01-01 RX ORDER — DEXTROSE MONOHYDRATE 25 G/50ML
12.5 INJECTION, SOLUTION INTRAVENOUS PRN
Status: DISCONTINUED | OUTPATIENT
Start: 2021-01-01 | End: 2021-01-01 | Stop reason: HOSPADM

## 2021-01-01 RX ORDER — ONDANSETRON 2 MG/ML
4 INJECTION INTRAMUSCULAR; INTRAVENOUS EVERY 6 HOURS PRN
Status: DISCONTINUED | OUTPATIENT
Start: 2021-01-01 | End: 2021-01-01 | Stop reason: HOSPADM

## 2021-01-01 RX ORDER — TAMSULOSIN HYDROCHLORIDE 0.4 MG/1
0.4 CAPSULE ORAL NIGHTLY
Status: CANCELLED | OUTPATIENT
Start: 2021-01-01

## 2021-01-01 RX ORDER — OXYCODONE HYDROCHLORIDE AND ACETAMINOPHEN 5; 325 MG/1; MG/1
1 TABLET ORAL EVERY 4 HOURS PRN
Status: DISCONTINUED | OUTPATIENT
Start: 2021-01-01 | End: 2021-01-01 | Stop reason: HOSPADM

## 2021-01-01 RX ORDER — SODIUM CHLORIDE 9 MG/ML
INJECTION, SOLUTION INTRAVENOUS CONTINUOUS PRN
Status: DISCONTINUED | OUTPATIENT
Start: 2021-01-01 | End: 2021-01-01 | Stop reason: SDUPTHER

## 2021-01-01 RX ORDER — ROSUVASTATIN CALCIUM 10 MG/1
5 TABLET, COATED ORAL EVERY OTHER DAY
Status: DISCONTINUED | OUTPATIENT
Start: 2021-01-01 | End: 2021-01-01

## 2021-01-01 RX ORDER — SODIUM CHLORIDE 9 MG/ML
25 INJECTION, SOLUTION INTRAVENOUS PRN
Status: DISCONTINUED | OUTPATIENT
Start: 2021-01-01 | End: 2021-01-01 | Stop reason: HOSPADM

## 2021-01-01 RX ORDER — POLYETHYLENE GLYCOL 3350 17 G/17G
17 POWDER, FOR SOLUTION ORAL 2 TIMES DAILY
COMMUNITY

## 2021-01-01 RX ORDER — LIDOCAINE HYDROCHLORIDE 20 MG/ML
INJECTION, SOLUTION INTRAVENOUS PRN
Status: DISCONTINUED | OUTPATIENT
Start: 2021-01-01 | End: 2021-01-01 | Stop reason: SDUPTHER

## 2021-01-01 RX ORDER — AMLODIPINE BESYLATE 5 MG/1
5 TABLET ORAL 2 TIMES DAILY
Status: CANCELLED | OUTPATIENT
Start: 2021-01-01

## 2021-01-01 RX ORDER — SODIUM CHLORIDE 0.9 % (FLUSH) 0.9 %
5-40 SYRINGE (ML) INJECTION EVERY 12 HOURS SCHEDULED
Status: DISCONTINUED | OUTPATIENT
Start: 2021-01-01 | End: 2021-01-01 | Stop reason: HOSPADM

## 2021-01-01 RX ORDER — DEXTROSE MONOHYDRATE 100 MG/ML
INJECTION, SOLUTION INTRAVENOUS ONCE
Status: DISCONTINUED | OUTPATIENT
Start: 2021-01-01 | End: 2021-01-01 | Stop reason: HOSPADM

## 2021-01-01 RX ORDER — DEXTROSE MONOHYDRATE 50 MG/ML
100 INJECTION, SOLUTION INTRAVENOUS PRN
Status: DISCONTINUED | OUTPATIENT
Start: 2021-01-01 | End: 2021-01-01 | Stop reason: HOSPADM

## 2021-01-01 RX ORDER — CLOPIDOGREL BISULFATE 75 MG/1
75 TABLET ORAL DAILY
Status: CANCELLED | OUTPATIENT
Start: 2021-01-01

## 2021-01-01 RX ORDER — SPIRONOLACTONE 25 MG/1
25 TABLET ORAL DAILY
Status: DISCONTINUED | OUTPATIENT
Start: 2021-01-01 | End: 2021-01-01 | Stop reason: HOSPADM

## 2021-01-01 RX ORDER — FLUOXETINE 10 MG/1
10 TABLET, FILM COATED ORAL NIGHTLY
COMMUNITY

## 2021-01-01 RX ORDER — FENTANYL CITRATE 50 UG/ML
INJECTION, SOLUTION INTRAMUSCULAR; INTRAVENOUS PRN
Status: DISCONTINUED | OUTPATIENT
Start: 2021-01-01 | End: 2021-01-01 | Stop reason: SDUPTHER

## 2021-01-01 RX ORDER — INSULIN GLARGINE 100 [IU]/ML
6 INJECTION, SOLUTION SUBCUTANEOUS NIGHTLY
Status: DISCONTINUED | OUTPATIENT
Start: 2021-01-01 | End: 2021-01-01 | Stop reason: CLARIF

## 2021-01-01 RX ORDER — ROSUVASTATIN CALCIUM 5 MG/1
5 TABLET, COATED ORAL EVERY OTHER DAY
Status: CANCELLED | OUTPATIENT
Start: 2021-01-01

## 2021-01-01 RX ORDER — MAGNESIUM OXIDE 400 MG/1
400 TABLET ORAL DAILY
Status: DISCONTINUED | OUTPATIENT
Start: 2021-01-01 | End: 2021-01-01 | Stop reason: HOSPADM

## 2021-01-01 RX ORDER — FUROSEMIDE 20 MG/1
20 TABLET ORAL DAILY
Status: DISCONTINUED | OUTPATIENT
Start: 2021-01-01 | End: 2021-01-01 | Stop reason: HOSPADM

## 2021-01-01 RX ORDER — FLUOXETINE 10 MG/1
10 CAPSULE ORAL DAILY
Status: DISCONTINUED | OUTPATIENT
Start: 2021-01-01 | End: 2021-01-01

## 2021-01-01 RX ORDER — MORPHINE SULFATE 5 MG/ML
5 INJECTION, SOLUTION INTRAMUSCULAR; INTRAVENOUS ONCE
Status: COMPLETED | OUTPATIENT
Start: 2021-01-01 | End: 2021-01-01

## 2021-01-01 RX ORDER — NITROGLYCERIN 0.4 MG/1
0.4 TABLET SUBLINGUAL EVERY 5 MIN PRN
Status: DISCONTINUED | OUTPATIENT
Start: 2021-01-01 | End: 2021-01-01 | Stop reason: HOSPADM

## 2021-01-01 RX ORDER — FUROSEMIDE 10 MG/ML
40 INJECTION INTRAMUSCULAR; INTRAVENOUS 2 TIMES DAILY
Status: CANCELLED | OUTPATIENT
Start: 2021-01-01

## 2021-01-01 RX ORDER — EPHEDRINE SULFATE/0.9% NACL/PF 50 MG/5 ML
SYRINGE (ML) INTRAVENOUS PRN
Status: DISCONTINUED | OUTPATIENT
Start: 2021-01-01 | End: 2021-01-01

## 2021-01-01 RX ORDER — ASPIRIN 81 MG/1
81 TABLET, CHEWABLE ORAL DAILY
Status: DISCONTINUED | OUTPATIENT
Start: 2021-01-01 | End: 2021-01-01 | Stop reason: HOSPADM

## 2021-01-01 RX ORDER — MAGNESIUM OXIDE 400 MG/1
400 TABLET ORAL DAILY
Status: DISCONTINUED | OUTPATIENT
Start: 2021-01-01 | End: 2021-01-01

## 2021-01-01 RX ORDER — NICOTINE POLACRILEX 4 MG
15 LOZENGE BUCCAL PRN
Status: DISCONTINUED | OUTPATIENT
Start: 2021-01-01 | End: 2021-01-01 | Stop reason: HOSPADM

## 2021-01-01 RX ORDER — AMLODIPINE BESYLATE 5 MG/1
5 TABLET ORAL DAILY
Status: DISCONTINUED | OUTPATIENT
Start: 2021-01-01 | End: 2021-01-01 | Stop reason: HOSPADM

## 2021-01-01 RX ORDER — AMLODIPINE BESYLATE 5 MG/1
5 TABLET ORAL DAILY
Status: DISCONTINUED | OUTPATIENT
Start: 2021-01-01 | End: 2021-01-01

## 2021-01-01 RX ORDER — ONDANSETRON 2 MG/ML
4 INJECTION INTRAMUSCULAR; INTRAVENOUS EVERY 6 HOURS PRN
Status: CANCELLED | OUTPATIENT
Start: 2021-01-01

## 2021-01-01 RX ORDER — METOPROLOL TARTRATE 50 MG/1
50 TABLET, FILM COATED ORAL 2 TIMES DAILY
Status: DISCONTINUED | OUTPATIENT
Start: 2021-01-01 | End: 2021-01-01

## 2021-01-01 RX ORDER — LOSARTAN POTASSIUM 100 MG/1
100 TABLET ORAL EVERY EVENING
Status: DISCONTINUED | OUTPATIENT
Start: 2021-01-01 | End: 2021-01-01 | Stop reason: HOSPADM

## 2021-01-01 RX ORDER — LOSARTAN POTASSIUM 50 MG/1
100 TABLET ORAL DAILY
Status: DISCONTINUED | OUTPATIENT
Start: 2021-01-01 | End: 2021-01-01 | Stop reason: HOSPADM

## 2021-01-01 RX ORDER — NICOTINE POLACRILEX 4 MG
15 LOZENGE BUCCAL PRN
Status: CANCELLED | OUTPATIENT
Start: 2021-01-01

## 2021-01-01 RX ORDER — ISOSORBIDE MONONITRATE 30 MG/1
30 TABLET, EXTENDED RELEASE ORAL DAILY
Status: CANCELLED | OUTPATIENT
Start: 2021-01-01

## 2021-01-01 RX ORDER — 0.9 % SODIUM CHLORIDE 0.9 %
1000 INTRAVENOUS SOLUTION INTRAVENOUS ONCE
Status: COMPLETED | OUTPATIENT
Start: 2021-01-01 | End: 2021-01-01

## 2021-01-01 RX ORDER — PROPOFOL 10 MG/ML
INJECTION, EMULSION INTRAVENOUS CONTINUOUS PRN
Status: DISCONTINUED | OUTPATIENT
Start: 2021-01-01 | End: 2021-01-01 | Stop reason: SDUPTHER

## 2021-01-01 RX ORDER — CLOPIDOGREL BISULFATE 75 MG/1
75 TABLET ORAL DAILY
Status: DISCONTINUED | OUTPATIENT
Start: 2021-01-01 | End: 2021-01-01 | Stop reason: HOSPADM

## 2021-01-01 RX ORDER — ISOSORBIDE MONONITRATE 30 MG/1
30 TABLET, EXTENDED RELEASE ORAL DAILY
Status: DISCONTINUED | OUTPATIENT
Start: 2021-01-01 | End: 2021-01-01 | Stop reason: HOSPADM

## 2021-01-01 RX ORDER — SODIUM CHLORIDE 9 MG/ML
25 INJECTION, SOLUTION INTRAVENOUS PRN
Status: DISCONTINUED | OUTPATIENT
Start: 2021-01-01 | End: 2021-01-01 | Stop reason: SDUPTHER

## 2021-01-01 RX ORDER — TAMSULOSIN HYDROCHLORIDE 0.4 MG/1
0.8 CAPSULE ORAL DAILY
Status: DISCONTINUED | OUTPATIENT
Start: 2021-01-01 | End: 2021-01-01 | Stop reason: HOSPADM

## 2021-01-01 RX ORDER — ISOSORBIDE MONONITRATE 60 MG/1
60 TABLET, EXTENDED RELEASE ORAL DAILY
Status: DISCONTINUED | OUTPATIENT
Start: 2021-01-01 | End: 2021-01-01 | Stop reason: HOSPADM

## 2021-01-01 RX ORDER — ACETAMINOPHEN 325 MG/1
650 TABLET ORAL EVERY 4 HOURS PRN
Status: DISCONTINUED | OUTPATIENT
Start: 2021-01-01 | End: 2021-01-01 | Stop reason: HOSPADM

## 2021-01-01 RX ORDER — DAPSONE 25 MG/1
25 TABLET ORAL DAILY
Status: CANCELLED | OUTPATIENT
Start: 2021-01-01

## 2021-01-01 RX ORDER — NIFEDIPINE 60 MG/1
60 TABLET, EXTENDED RELEASE ORAL DAILY
Status: DISCONTINUED | OUTPATIENT
Start: 2021-01-01 | End: 2021-01-01 | Stop reason: HOSPADM

## 2021-01-01 RX ORDER — SODIUM CHLORIDE 0.9 % (FLUSH) 0.9 %
5-40 SYRINGE (ML) INJECTION PRN
Status: DISCONTINUED | OUTPATIENT
Start: 2021-01-01 | End: 2021-01-01 | Stop reason: HOSPADM

## 2021-01-01 RX ORDER — ROSUVASTATIN CALCIUM 10 MG/1
10 TABLET, COATED ORAL EVERY OTHER DAY
Status: DISCONTINUED | OUTPATIENT
Start: 2021-01-01 | End: 2021-01-01 | Stop reason: HOSPADM

## 2021-01-01 RX ORDER — LOSARTAN POTASSIUM 100 MG/1
100 TABLET ORAL DAILY
Status: CANCELLED | OUTPATIENT
Start: 2021-01-01

## 2021-01-01 RX ORDER — POLYETHYLENE GLYCOL 3350 17 G/17G
17 POWDER, FOR SOLUTION ORAL 2 TIMES DAILY
Status: DISCONTINUED | OUTPATIENT
Start: 2021-01-01 | End: 2021-01-01 | Stop reason: HOSPADM

## 2021-01-01 RX ORDER — HYDROCODONE BITARTRATE AND ACETAMINOPHEN 5; 325 MG/1; MG/1
1 TABLET ORAL EVERY 4 HOURS PRN
Qty: 42 TABLET | Refills: 0 | Status: SHIPPED | OUTPATIENT
Start: 2021-01-01 | End: 2021-01-01

## 2021-01-01 RX ORDER — ONDANSETRON 4 MG/1
4 TABLET, ORALLY DISINTEGRATING ORAL EVERY 8 HOURS PRN
Status: DISCONTINUED | OUTPATIENT
Start: 2021-01-01 | End: 2021-01-01 | Stop reason: HOSPADM

## 2021-01-01 RX ORDER — IPRATROPIUM BROMIDE AND ALBUTEROL SULFATE 2.5; .5 MG/3ML; MG/3ML
1 SOLUTION RESPIRATORY (INHALATION) EVERY 6 HOURS
Status: DISCONTINUED | OUTPATIENT
Start: 2021-01-01 | End: 2021-01-01 | Stop reason: HOSPADM

## 2021-01-01 RX ORDER — PETROLATUM 430 MG/G
56 OINTMENT TOPICAL 2 TIMES DAILY PRN
Status: DISCONTINUED | OUTPATIENT
Start: 2021-01-01 | End: 2021-01-01 | Stop reason: HOSPADM

## 2021-01-01 RX ORDER — ISOSORBIDE MONONITRATE 60 MG/1
60 TABLET, EXTENDED RELEASE ORAL DAILY
Status: DISCONTINUED | OUTPATIENT
Start: 2021-01-01 | End: 2021-01-01

## 2021-01-01 RX ORDER — METHOCARBAMOL 500 MG/1
500 TABLET, FILM COATED ORAL 2 TIMES DAILY PRN
Status: CANCELLED | OUTPATIENT
Start: 2021-01-01

## 2021-01-01 RX ORDER — ASPIRIN 81 MG/1
81 TABLET, CHEWABLE ORAL DAILY
Status: CANCELLED | OUTPATIENT
Start: 2021-01-01

## 2021-01-01 RX ORDER — MEPERIDINE HYDROCHLORIDE 25 MG/ML
12.5 INJECTION INTRAMUSCULAR; INTRAVENOUS; SUBCUTANEOUS EVERY 5 MIN PRN
Status: DISCONTINUED | OUTPATIENT
Start: 2021-01-01 | End: 2021-01-01 | Stop reason: HOSPADM

## 2021-01-01 RX ORDER — PANTOPRAZOLE SODIUM 40 MG/1
40 TABLET, DELAYED RELEASE ORAL DAILY
Status: CANCELLED | OUTPATIENT
Start: 2021-01-01

## 2021-01-01 RX ORDER — SPIRONOLACTONE 25 MG/1
25 TABLET ORAL DAILY
Status: DISCONTINUED | OUTPATIENT
Start: 2021-01-01 | End: 2021-01-01

## 2021-01-01 RX ORDER — DEXTROSE MONOHYDRATE 25 G/50ML
12.5 INJECTION, SOLUTION INTRAVENOUS PRN
Status: CANCELLED | OUTPATIENT
Start: 2021-01-01

## 2021-01-01 RX ORDER — TAMSULOSIN HYDROCHLORIDE 0.4 MG/1
0.8 CAPSULE ORAL NIGHTLY
Status: DISCONTINUED | OUTPATIENT
Start: 2021-01-01 | End: 2021-01-01 | Stop reason: HOSPADM

## 2021-01-01 RX ORDER — DEXTROSE MONOHYDRATE 25 G/50ML
25 INJECTION, SOLUTION INTRAVENOUS ONCE
Status: COMPLETED | OUTPATIENT
Start: 2021-01-01 | End: 2021-01-01

## 2021-01-01 RX ORDER — OXYCODONE HYDROCHLORIDE 5 MG/1
5 TABLET ORAL ONCE
Status: COMPLETED | OUTPATIENT
Start: 2021-01-01 | End: 2021-01-01

## 2021-01-01 RX ORDER — HYDROCODONE BITARTRATE AND ACETAMINOPHEN 5; 325 MG/1; MG/1
1 TABLET ORAL 2 TIMES DAILY PRN
Status: CANCELLED | OUTPATIENT
Start: 2021-01-01

## 2021-01-01 RX ADMIN — SODIUM CHLORIDE 1000 ML: 9 INJECTION, SOLUTION INTRAVENOUS at 13:50

## 2021-01-01 RX ADMIN — FENTANYL CITRATE 25 MCG: 50 INJECTION, SOLUTION INTRAMUSCULAR; INTRAVENOUS at 13:21

## 2021-01-01 RX ADMIN — AMLODIPINE BESYLATE 5 MG: 5 TABLET ORAL at 09:33

## 2021-01-01 RX ADMIN — PERFLUTREN 1.5 ML: 6.52 INJECTION, SUSPENSION INTRAVENOUS at 11:57

## 2021-01-01 RX ADMIN — ISOSORBIDE MONONITRATE 60 MG: 60 TABLET, EXTENDED RELEASE ORAL at 09:39

## 2021-01-01 RX ADMIN — CARBIDOPA AND LEVODOPA 1 TABLET: 25; 100 TABLET ORAL at 09:56

## 2021-01-01 RX ADMIN — CARBIDOPA AND LEVODOPA 1 TABLET: 25; 100 TABLET ORAL at 08:24

## 2021-01-01 RX ADMIN — CLONIDINE HYDROCHLORIDE 0.3 MG: 0.2 TABLET ORAL at 09:33

## 2021-01-01 RX ADMIN — FLUOXETINE 10 MG: 10 CAPSULE ORAL at 21:33

## 2021-01-01 RX ADMIN — IOPAMIDOL 75 ML: 755 INJECTION, SOLUTION INTRAVENOUS at 18:14

## 2021-01-01 RX ADMIN — FLUOXETINE 10 MG: 10 CAPSULE ORAL at 20:31

## 2021-01-01 RX ADMIN — IPRATROPIUM BROMIDE AND ALBUTEROL SULFATE 1 AMPULE: .5; 2.5 SOLUTION RESPIRATORY (INHALATION) at 05:25

## 2021-01-01 RX ADMIN — TAMSULOSIN HYDROCHLORIDE 0.8 MG: 0.4 CAPSULE ORAL at 09:39

## 2021-01-01 RX ADMIN — Medication 10 ML: at 21:36

## 2021-01-01 RX ADMIN — FLUOXETINE HYDROCHLORIDE 10 MG: 10 CAPSULE ORAL at 21:26

## 2021-01-01 RX ADMIN — DAPSONE 50 MG: 25 TABLET ORAL at 09:56

## 2021-01-01 RX ADMIN — LOSARTAN POTASSIUM 100 MG: 100 TABLET, FILM COATED ORAL at 18:33

## 2021-01-01 RX ADMIN — DAPSONE 25 MG: 25 TABLET ORAL at 21:34

## 2021-01-01 RX ADMIN — ISOSORBIDE MONONITRATE 60 MG: 60 TABLET, EXTENDED RELEASE ORAL at 08:27

## 2021-01-01 RX ADMIN — TAMSULOSIN HYDROCHLORIDE 0.8 MG: 0.4 CAPSULE ORAL at 09:12

## 2021-01-01 RX ADMIN — OXYCODONE 5 MG: 5 TABLET ORAL at 11:39

## 2021-01-01 RX ADMIN — FUROSEMIDE 20 MG: 10 INJECTION, SOLUTION INTRAMUSCULAR; INTRAVENOUS at 09:39

## 2021-01-01 RX ADMIN — CARVEDILOL 6.25 MG: 6.25 TABLET, FILM COATED ORAL at 09:13

## 2021-01-01 RX ADMIN — FLUOXETINE HYDROCHLORIDE 10 MG: 10 CAPSULE ORAL at 21:06

## 2021-01-01 RX ADMIN — FLUTICASONE PROPIONATE 2 SPRAY: 50 SPRAY, METERED NASAL at 08:29

## 2021-01-01 RX ADMIN — TAMSULOSIN HYDROCHLORIDE 0.8 MG: 0.4 CAPSULE ORAL at 21:26

## 2021-01-01 RX ADMIN — LOSARTAN POTASSIUM 100 MG: 50 TABLET, FILM COATED ORAL at 08:17

## 2021-01-01 RX ADMIN — ASPIRIN 81 MG: 81 TABLET, CHEWABLE ORAL at 09:38

## 2021-01-01 RX ADMIN — METOPROLOL TARTRATE 25 MG: 25 TABLET, FILM COATED ORAL at 21:26

## 2021-01-01 RX ADMIN — MAGNESIUM OXIDE 400 MG: 400 TABLET ORAL at 08:25

## 2021-01-01 RX ADMIN — Medication 10 ML: at 11:01

## 2021-01-01 RX ADMIN — DAPSONE 50 MG: 25 TABLET ORAL at 09:12

## 2021-01-01 RX ADMIN — PHENYLEPHRINE HYDROCHLORIDE 100 MCG: 10 INJECTION INTRAVENOUS at 13:23

## 2021-01-01 RX ADMIN — ASPIRIN 81 MG: 81 TABLET, CHEWABLE ORAL at 09:12

## 2021-01-01 RX ADMIN — FUROSEMIDE 20 MG: 10 INJECTION, SOLUTION INTRAMUSCULAR; INTRAVENOUS at 18:38

## 2021-01-01 RX ADMIN — INSULIN LISPRO 2 UNITS: 100 INJECTION, SOLUTION INTRAVENOUS; SUBCUTANEOUS at 11:54

## 2021-01-01 RX ADMIN — IPRATROPIUM BROMIDE AND ALBUTEROL SULFATE 1 AMPULE: .5; 2.5 SOLUTION RESPIRATORY (INHALATION) at 12:38

## 2021-01-01 RX ADMIN — DAPSONE 50 MG: 25 TABLET ORAL at 10:33

## 2021-01-01 RX ADMIN — METOPROLOL TARTRATE 25 MG: 25 TABLET, FILM COATED ORAL at 21:33

## 2021-01-01 RX ADMIN — INSULIN LISPRO 20 UNITS: 100 INJECTION, SUSPENSION SUBCUTANEOUS at 11:41

## 2021-01-01 RX ADMIN — IPRATROPIUM BROMIDE AND ALBUTEROL SULFATE 1 AMPULE: .5; 2.5 SOLUTION RESPIRATORY (INHALATION) at 17:59

## 2021-01-01 RX ADMIN — INSULIN LISPRO 2 UNITS: 100 INJECTION, SOLUTION INTRAVENOUS; SUBCUTANEOUS at 17:23

## 2021-01-01 RX ADMIN — TAMSULOSIN HYDROCHLORIDE 0.8 MG: 0.4 CAPSULE ORAL at 09:56

## 2021-01-01 RX ADMIN — INSULIN LISPRO 10 UNITS: 100 INJECTION, SUSPENSION SUBCUTANEOUS at 21:11

## 2021-01-01 RX ADMIN — CARVEDILOL 6.25 MG: 6.25 TABLET, FILM COATED ORAL at 17:27

## 2021-01-01 RX ADMIN — CARBIDOPA AND LEVODOPA 1 TABLET: 25; 100 TABLET ORAL at 21:07

## 2021-01-01 RX ADMIN — CARVEDILOL 6.25 MG: 6.25 TABLET, FILM COATED ORAL at 17:21

## 2021-01-01 RX ADMIN — ISOSORBIDE MONONITRATE 30 MG: 30 TABLET, EXTENDED RELEASE ORAL at 11:00

## 2021-01-01 RX ADMIN — AMLODIPINE BESYLATE 5 MG: 5 TABLET ORAL at 09:21

## 2021-01-01 RX ADMIN — INSULIN LISPRO 2 UNITS: 100 INJECTION, SOLUTION INTRAVENOUS; SUBCUTANEOUS at 17:12

## 2021-01-01 RX ADMIN — INSULIN LISPRO 2 UNITS: 100 INJECTION, SOLUTION INTRAVENOUS; SUBCUTANEOUS at 22:23

## 2021-01-01 RX ADMIN — SPIRONOLACTONE 25 MG: 25 TABLET ORAL at 09:56

## 2021-01-01 RX ADMIN — CARBIDOPA AND LEVODOPA 1 TABLET: 25; 100 TABLET ORAL at 14:55

## 2021-01-01 RX ADMIN — FUROSEMIDE 20 MG: 20 TABLET ORAL at 09:20

## 2021-01-01 RX ADMIN — FUROSEMIDE 40 MG: 10 INJECTION, SOLUTION INTRAMUSCULAR; INTRAVENOUS at 08:44

## 2021-01-01 RX ADMIN — OXYCODONE AND ACETAMINOPHEN 2 TABLET: 5; 325 TABLET ORAL at 18:27

## 2021-01-01 RX ADMIN — DAPSONE 25 MG: 25 TABLET ORAL at 20:29

## 2021-01-01 RX ADMIN — ACETAMINOPHEN 650 MG: 325 TABLET ORAL at 04:17

## 2021-01-01 RX ADMIN — POLYETHYLENE GLYCOL 3350 17 G: 17 POWDER, FOR SOLUTION ORAL at 09:51

## 2021-01-01 RX ADMIN — CARBIDOPA AND LEVODOPA 1 TABLET: 25; 100 TABLET ORAL at 20:30

## 2021-01-01 RX ADMIN — DAPSONE 25 MG: 25 TABLET ORAL at 22:33

## 2021-01-01 RX ADMIN — IPRATROPIUM BROMIDE AND ALBUTEROL SULFATE 1 AMPULE: .5; 2.5 SOLUTION RESPIRATORY (INHALATION) at 21:48

## 2021-01-01 RX ADMIN — MAGNESIUM OXIDE 400 MG: 400 TABLET ORAL at 09:38

## 2021-01-01 RX ADMIN — METOPROLOL TARTRATE 25 MG: 25 TABLET, FILM COATED ORAL at 21:07

## 2021-01-01 RX ADMIN — FLUTICASONE PROPIONATE 2 SPRAY: 50 SPRAY, METERED NASAL at 09:57

## 2021-01-01 RX ADMIN — IPRATROPIUM BROMIDE AND ALBUTEROL SULFATE 1 AMPULE: .5; 2.5 SOLUTION RESPIRATORY (INHALATION) at 19:53

## 2021-01-01 RX ADMIN — CARBIDOPA AND LEVODOPA 1 TABLET: 25; 100 TABLET ORAL at 15:35

## 2021-01-01 RX ADMIN — Medication 10 ML: at 21:14

## 2021-01-01 RX ADMIN — ROSUVASTATIN CALCIUM 10 MG: 10 TABLET, COATED ORAL at 09:12

## 2021-01-01 RX ADMIN — INSULIN LISPRO 20 UNITS: 100 INJECTION, SUSPENSION SUBCUTANEOUS at 09:35

## 2021-01-01 RX ADMIN — CARBIDOPA AND LEVODOPA 1 TABLET: 25; 100 TABLET ORAL at 13:51

## 2021-01-01 RX ADMIN — OXYCODONE AND ACETAMINOPHEN 1 TABLET: 5; 325 TABLET ORAL at 01:44

## 2021-01-01 RX ADMIN — INSULIN LISPRO 2 UNITS: 100 INJECTION, SOLUTION INTRAVENOUS; SUBCUTANEOUS at 12:26

## 2021-01-01 RX ADMIN — LOSARTAN POTASSIUM 100 MG: 50 TABLET, FILM COATED ORAL at 11:00

## 2021-01-01 RX ADMIN — ASPIRIN 81 MG: 81 TABLET, CHEWABLE ORAL at 09:56

## 2021-01-01 RX ADMIN — IPRATROPIUM BROMIDE AND ALBUTEROL SULFATE 1 AMPULE: .5; 2.5 SOLUTION RESPIRATORY (INHALATION) at 13:45

## 2021-01-01 RX ADMIN — MORPHINE SULFATE 5 MG: 5 INJECTION, SOLUTION INTRAMUSCULAR; INTRAVENOUS at 02:44

## 2021-01-01 RX ADMIN — LIDOCAINE HYDROCHLORIDE 60 MG: 20 INJECTION, SOLUTION INTRAVENOUS at 14:04

## 2021-01-01 RX ADMIN — SPIRONOLACTONE 25 MG: 25 TABLET ORAL at 09:13

## 2021-01-01 RX ADMIN — PROPOFOL INJECTABLE EMULSION 40 MCG/KG/MIN: 10 INJECTION, EMULSION INTRAVENOUS at 13:26

## 2021-01-01 RX ADMIN — OXYCODONE AND ACETAMINOPHEN 2 TABLET: 5; 325 TABLET ORAL at 09:20

## 2021-01-01 RX ADMIN — FLUOXETINE HYDROCHLORIDE 10 MG: 10 CAPSULE ORAL at 21:34

## 2021-01-01 RX ADMIN — DEXTROSE MONOHYDRATE 25 G: 25 INJECTION, SOLUTION INTRAVENOUS at 07:20

## 2021-01-01 RX ADMIN — AMLODIPINE BESYLATE 5 MG: 5 TABLET ORAL at 08:17

## 2021-01-01 RX ADMIN — ISOSORBIDE MONONITRATE 60 MG: 60 TABLET, EXTENDED RELEASE ORAL at 09:13

## 2021-01-01 RX ADMIN — TAMSULOSIN HYDROCHLORIDE 0.8 MG: 0.4 CAPSULE ORAL at 21:06

## 2021-01-01 RX ADMIN — CARBIDOPA AND LEVODOPA 1 TABLET: 25; 100 TABLET ORAL at 14:30

## 2021-01-01 RX ADMIN — PHENYLEPHRINE HYDROCHLORIDE 100 MCG: 10 INJECTION INTRAVENOUS at 14:01

## 2021-01-01 RX ADMIN — INSULIN LISPRO 15 UNITS: 100 INJECTION, SUSPENSION SUBCUTANEOUS at 21:34

## 2021-01-01 RX ADMIN — LOSARTAN POTASSIUM 100 MG: 50 TABLET, FILM COATED ORAL at 09:19

## 2021-01-01 RX ADMIN — AMLODIPINE BESYLATE 5 MG: 5 TABLET ORAL at 09:39

## 2021-01-01 RX ADMIN — DAPSONE 50 MG: 25 TABLET ORAL at 09:33

## 2021-01-01 RX ADMIN — FLUTICASONE PROPIONATE 2 SPRAY: 50 SPRAY, METERED NASAL at 09:13

## 2021-01-01 RX ADMIN — METOPROLOL TARTRATE 25 MG: 25 TABLET, FILM COATED ORAL at 09:21

## 2021-01-01 RX ADMIN — OXYCODONE AND ACETAMINOPHEN 2 TABLET: 5; 325 TABLET ORAL at 09:34

## 2021-01-01 RX ADMIN — FUROSEMIDE 40 MG: 10 INJECTION, SOLUTION INTRAVENOUS at 20:31

## 2021-01-01 RX ADMIN — CLONIDINE HYDROCHLORIDE 0.3 MG: 0.2 TABLET ORAL at 08:26

## 2021-01-01 RX ADMIN — AMLODIPINE BESYLATE 5 MG: 5 TABLET ORAL at 09:13

## 2021-01-01 RX ADMIN — ISOSORBIDE MONONITRATE 30 MG: 30 TABLET, EXTENDED RELEASE ORAL at 09:20

## 2021-01-01 RX ADMIN — AMLODIPINE BESYLATE 5 MG: 5 TABLET ORAL at 11:00

## 2021-01-01 RX ADMIN — DAPSONE 25 MG: 25 TABLET ORAL at 21:26

## 2021-01-01 RX ADMIN — FLUOXETINE HYDROCHLORIDE 10 MG: 10 CAPSULE ORAL at 20:41

## 2021-01-01 RX ADMIN — DAPSONE 50 MG: 25 TABLET ORAL at 09:20

## 2021-01-01 RX ADMIN — CARBIDOPA AND LEVODOPA 1 TABLET: 25; 100 TABLET ORAL at 21:27

## 2021-01-01 RX ADMIN — INSULIN LISPRO 4 UNITS: 100 INJECTION, SOLUTION INTRAVENOUS; SUBCUTANEOUS at 18:41

## 2021-01-01 RX ADMIN — POLYETHYLENE GLYCOL 3350 17 G: 17 POWDER, FOR SOLUTION ORAL at 11:01

## 2021-01-01 RX ADMIN — FENTANYL CITRATE 75 MCG: 50 INJECTION, SOLUTION INTRAMUSCULAR; INTRAVENOUS at 13:26

## 2021-01-01 RX ADMIN — CARBIDOPA AND LEVODOPA 1 TABLET: 25; 100 TABLET ORAL at 08:28

## 2021-01-01 RX ADMIN — CLONIDINE HYDROCHLORIDE 0.3 MG: 0.2 TABLET ORAL at 21:05

## 2021-01-01 RX ADMIN — CARBIDOPA AND LEVODOPA 1 TABLET: 25; 100 TABLET ORAL at 21:22

## 2021-01-01 RX ADMIN — CLONIDINE HYDROCHLORIDE 0.3 MG: 0.2 TABLET ORAL at 10:59

## 2021-01-01 RX ADMIN — FLUTICASONE PROPIONATE 2 SPRAY: 50 SPRAY, METERED NASAL at 08:23

## 2021-01-01 RX ADMIN — INSULIN LISPRO 10 UNITS: 100 INJECTION, SUSPENSION SUBCUTANEOUS at 23:16

## 2021-01-01 RX ADMIN — OXYCODONE AND ACETAMINOPHEN 2 TABLET: 5; 325 TABLET ORAL at 17:50

## 2021-01-01 RX ADMIN — IPRATROPIUM BROMIDE AND ALBUTEROL SULFATE 1 AMPULE: .5; 2.5 SOLUTION RESPIRATORY (INHALATION) at 17:14

## 2021-01-01 RX ADMIN — CARBIDOPA AND LEVODOPA 1 TABLET: 25; 100 TABLET ORAL at 14:22

## 2021-01-01 RX ADMIN — MAGNESIUM OXIDE 400 MG: 400 TABLET ORAL at 11:00

## 2021-01-01 RX ADMIN — SPIRONOLACTONE 25 MG: 25 TABLET ORAL at 15:00

## 2021-01-01 RX ADMIN — METOPROLOL TARTRATE 25 MG: 25 TABLET, FILM COATED ORAL at 09:33

## 2021-01-01 RX ADMIN — CARBIDOPA AND LEVODOPA 1 TABLET: 25; 100 TABLET ORAL at 09:33

## 2021-01-01 RX ADMIN — IPRATROPIUM BROMIDE AND ALBUTEROL SULFATE 1 AMPULE: .5; 2.5 SOLUTION RESPIRATORY (INHALATION) at 09:38

## 2021-01-01 RX ADMIN — INSULIN LISPRO 1 UNITS: 100 INJECTION, SOLUTION INTRAVENOUS; SUBCUTANEOUS at 21:33

## 2021-01-01 RX ADMIN — CLONIDINE HYDROCHLORIDE 0.3 MG: 0.2 TABLET ORAL at 08:16

## 2021-01-01 RX ADMIN — INSULIN LISPRO 15 UNITS: 100 INJECTION, SUSPENSION SUBCUTANEOUS at 22:22

## 2021-01-01 RX ADMIN — METOPROLOL TARTRATE 25 MG: 25 TABLET, FILM COATED ORAL at 08:16

## 2021-01-01 RX ADMIN — METOPROLOL TARTRATE 25 MG: 25 TABLET, FILM COATED ORAL at 09:39

## 2021-01-01 RX ADMIN — INSULIN LISPRO 15 UNITS: 100 INJECTION, SUSPENSION SUBCUTANEOUS at 21:26

## 2021-01-01 RX ADMIN — TAMSULOSIN HYDROCHLORIDE 0.8 MG: 0.4 CAPSULE ORAL at 08:28

## 2021-01-01 RX ADMIN — DAPSONE 50 MG: 25 TABLET ORAL at 08:23

## 2021-01-01 RX ADMIN — METOPROLOL TARTRATE 25 MG: 25 TABLET, FILM COATED ORAL at 09:13

## 2021-01-01 RX ADMIN — FUROSEMIDE 20 MG: 10 INJECTION, SOLUTION INTRAMUSCULAR; INTRAVENOUS at 09:13

## 2021-01-01 RX ADMIN — CEFAZOLIN 2000 MG: 10 INJECTION, POWDER, FOR SOLUTION INTRAVENOUS at 23:13

## 2021-01-01 RX ADMIN — ASPIRIN 324 MG: 81 TABLET, CHEWABLE ORAL at 11:01

## 2021-01-01 RX ADMIN — MAGNESIUM OXIDE 400 MG: 400 TABLET ORAL at 08:16

## 2021-01-01 RX ADMIN — OXYCODONE AND ACETAMINOPHEN 2 TABLET: 5; 325 TABLET ORAL at 20:42

## 2021-01-01 RX ADMIN — MAGNESIUM OXIDE 400 MG: 400 TABLET ORAL at 09:56

## 2021-01-01 RX ADMIN — CARBIDOPA AND LEVODOPA 1 TABLET: 25; 100 TABLET ORAL at 11:55

## 2021-01-01 RX ADMIN — EPHEDRINE SULFATE 30 MG: 50 INJECTION INTRAVENOUS at 13:30

## 2021-01-01 RX ADMIN — CLOPIDOGREL BISULFATE 75 MG: 75 TABLET ORAL at 08:28

## 2021-01-01 RX ADMIN — METOPROLOL TARTRATE 25 MG: 25 TABLET, FILM COATED ORAL at 21:23

## 2021-01-01 RX ADMIN — CARBIDOPA AND LEVODOPA 1 TABLET: 25; 100 TABLET ORAL at 22:21

## 2021-01-01 RX ADMIN — DAPSONE 25 MG: 25 TABLET ORAL at 21:06

## 2021-01-01 RX ADMIN — CLOPIDOGREL BISULFATE 75 MG: 75 TABLET ORAL at 09:12

## 2021-01-01 RX ADMIN — FUROSEMIDE 20 MG: 20 TABLET ORAL at 09:34

## 2021-01-01 RX ADMIN — CARBIDOPA AND LEVODOPA 1 TABLET: 25; 100 TABLET ORAL at 21:33

## 2021-01-01 RX ADMIN — CLONIDINE HYDROCHLORIDE 0.3 MG: 0.2 TABLET ORAL at 09:20

## 2021-01-01 RX ADMIN — CLOPIDOGREL BISULFATE 75 MG: 75 TABLET ORAL at 09:56

## 2021-01-01 RX ADMIN — Medication 10 ML: at 21:29

## 2021-01-01 RX ADMIN — ASPIRIN 81 MG: 81 TABLET, CHEWABLE ORAL at 20:31

## 2021-01-01 RX ADMIN — CARBIDOPA AND LEVODOPA 1 TABLET: 25; 100 TABLET ORAL at 20:41

## 2021-01-01 RX ADMIN — LOSARTAN POTASSIUM 100 MG: 50 TABLET, FILM COATED ORAL at 09:32

## 2021-01-01 RX ADMIN — METOPROLOL TARTRATE 50 MG: 50 TABLET, FILM COATED ORAL at 08:26

## 2021-01-01 RX ADMIN — AMLODIPINE BESYLATE 5 MG: 5 TABLET ORAL at 20:31

## 2021-01-01 RX ADMIN — Medication 10 ML: at 09:28

## 2021-01-01 RX ADMIN — CARBIDOPA AND LEVODOPA 1 TABLET: 25; 100 TABLET ORAL at 13:52

## 2021-01-01 RX ADMIN — FLUTICASONE PROPIONATE 2 SPRAY: 50 SPRAY, METERED NASAL at 09:49

## 2021-01-01 RX ADMIN — MAGNESIUM OXIDE 400 MG: 400 TABLET ORAL at 09:20

## 2021-01-01 RX ADMIN — CLONIDINE HYDROCHLORIDE 0.3 MG: 0.2 TABLET ORAL at 20:30

## 2021-01-01 RX ADMIN — FUROSEMIDE 40 MG: 10 INJECTION, SOLUTION INTRAMUSCULAR; INTRAVENOUS at 18:18

## 2021-01-01 RX ADMIN — Medication 2000 MG: at 23:13

## 2021-01-01 RX ADMIN — CARBIDOPA AND LEVODOPA 1 TABLET: 25; 100 TABLET ORAL at 09:20

## 2021-01-01 RX ADMIN — FUROSEMIDE 20 MG: 20 TABLET ORAL at 10:59

## 2021-01-01 RX ADMIN — DAPSONE 25 MG: 25 TABLET ORAL at 22:21

## 2021-01-01 RX ADMIN — FLUOXETINE 10 MG: 10 CAPSULE ORAL at 22:21

## 2021-01-01 RX ADMIN — MAGNESIUM OXIDE 400 MG: 400 TABLET ORAL at 09:32

## 2021-01-01 RX ADMIN — MAGNESIUM OXIDE 400 MG: 400 TABLET ORAL at 09:12

## 2021-01-01 RX ADMIN — ISOSORBIDE MONONITRATE 30 MG: 30 TABLET, EXTENDED RELEASE ORAL at 09:33

## 2021-01-01 RX ADMIN — SODIUM CHLORIDE: 9 INJECTION, SOLUTION INTRAVENOUS at 12:50

## 2021-01-01 RX ADMIN — IPRATROPIUM BROMIDE AND ALBUTEROL SULFATE 1 AMPULE: .5; 2.5 SOLUTION RESPIRATORY (INHALATION) at 22:00

## 2021-01-01 RX ADMIN — TAMSULOSIN HYDROCHLORIDE 0.8 MG: 0.4 CAPSULE ORAL at 21:32

## 2021-01-01 RX ADMIN — TAMSULOSIN HYDROCHLORIDE 0.8 MG: 0.4 CAPSULE ORAL at 20:41

## 2021-01-01 RX ADMIN — SODIUM CHLORIDE 250 ML: 0.9 INJECTION, SOLUTION INTRAVENOUS at 11:45

## 2021-01-01 RX ADMIN — METOPROLOL TARTRATE 50 MG: 50 TABLET, FILM COATED ORAL at 20:30

## 2021-01-01 RX ADMIN — FLUTICASONE PROPIONATE 2 SPRAY: 50 SPRAY, METERED NASAL at 11:47

## 2021-01-01 RX ADMIN — POLYETHYLENE GLYCOL 3350 17 G: 17 POWDER, FOR SOLUTION ORAL at 21:35

## 2021-01-01 RX ADMIN — IPRATROPIUM BROMIDE AND ALBUTEROL SULFATE 1 AMPULE: .5; 2.5 SOLUTION RESPIRATORY (INHALATION) at 05:58

## 2021-01-01 RX ADMIN — LORAZEPAM 0.5 MG: 2 INJECTION INTRAMUSCULAR; INTRAVENOUS at 00:24

## 2021-01-01 RX ADMIN — DAPSONE 25 MG: 25 TABLET ORAL at 20:41

## 2021-01-01 RX ADMIN — OXYCODONE AND ACETAMINOPHEN 1 TABLET: 5; 325 TABLET ORAL at 08:16

## 2021-01-01 RX ADMIN — ISOSORBIDE MONONITRATE 60 MG: 60 TABLET, EXTENDED RELEASE ORAL at 09:56

## 2021-01-01 RX ADMIN — DAPSONE 25 MG: 25 TABLET ORAL at 21:33

## 2021-01-01 RX ADMIN — ASPIRIN 81 MG CHEWABLE TABLET 324 MG: 81 TABLET CHEWABLE at 13:35

## 2021-01-01 RX ADMIN — CLONIDINE HYDROCHLORIDE 0.3 MG: 0.2 TABLET ORAL at 20:41

## 2021-01-01 RX ADMIN — FUROSEMIDE 20 MG: 10 INJECTION, SOLUTION INTRAMUSCULAR; INTRAVENOUS at 09:56

## 2021-01-01 RX ADMIN — INSULIN LISPRO 15 UNITS: 100 INJECTION, SUSPENSION SUBCUTANEOUS at 20:32

## 2021-01-01 RX ADMIN — Medication 10 ML: at 21:28

## 2021-01-01 RX ADMIN — IPRATROPIUM BROMIDE AND ALBUTEROL SULFATE 1 AMPULE: .5; 2.5 SOLUTION RESPIRATORY (INHALATION) at 06:02

## 2021-01-01 RX ADMIN — INSULIN LISPRO 10 UNITS: 100 INJECTION, SUSPENSION SUBCUTANEOUS at 20:45

## 2021-01-01 RX ADMIN — CLOPIDOGREL BISULFATE 75 MG: 75 TABLET ORAL at 09:39

## 2021-01-01 RX ADMIN — LOSARTAN POTASSIUM 100 MG: 100 TABLET, FILM COATED ORAL at 17:28

## 2021-01-01 RX ADMIN — Medication 2000 MG: at 13:08

## 2021-01-01 RX ADMIN — FUROSEMIDE 20 MG: 20 TABLET ORAL at 08:17

## 2021-01-01 RX ADMIN — IPRATROPIUM BROMIDE AND ALBUTEROL SULFATE 1 AMPULE: .5; 2.5 SOLUTION RESPIRATORY (INHALATION) at 16:04

## 2021-01-01 RX ADMIN — FLUTICASONE PROPIONATE 2 SPRAY: 50 SPRAY, METERED NASAL at 09:40

## 2021-01-01 RX ADMIN — CARBIDOPA AND LEVODOPA 1 TABLET: 25; 100 TABLET ORAL at 09:12

## 2021-01-01 RX ADMIN — CLONIDINE HYDROCHLORIDE 0.3 MG: 0.2 TABLET ORAL at 21:22

## 2021-01-01 RX ADMIN — IPRATROPIUM BROMIDE AND ALBUTEROL SULFATE 1 AMPULE: .5; 2.5 SOLUTION RESPIRATORY (INHALATION) at 23:33

## 2021-01-01 RX ADMIN — ISOSORBIDE MONONITRATE 30 MG: 30 TABLET, EXTENDED RELEASE ORAL at 08:17

## 2021-01-01 RX ADMIN — CLONIDINE HYDROCHLORIDE 0.3 MG: 0.2 TABLET ORAL at 21:25

## 2021-01-01 RX ADMIN — NIFEDIPINE 60 MG: 60 TABLET, FILM COATED, EXTENDED RELEASE ORAL at 12:33

## 2021-01-01 RX ADMIN — METOPROLOL TARTRATE 25 MG: 25 TABLET, FILM COATED ORAL at 10:59

## 2021-01-01 RX ADMIN — FLUTICASONE PROPIONATE 2 SPRAY: 50 SPRAY, METERED NASAL at 09:18

## 2021-01-01 RX ADMIN — CLONIDINE HYDROCHLORIDE 0.3 MG: 0.2 TABLET ORAL at 09:38

## 2021-01-01 RX ADMIN — ROSUVASTATIN CALCIUM 10 MG: 10 TABLET, COATED ORAL at 08:27

## 2021-01-01 RX ADMIN — POLYETHYLENE GLYCOL 3350 17 G: 17 POWDER, FOR SOLUTION ORAL at 21:25

## 2021-01-01 RX ADMIN — INSULIN LISPRO 20 UNITS: 100 INJECTION, SUSPENSION SUBCUTANEOUS at 09:30

## 2021-01-01 RX ADMIN — LOSARTAN POTASSIUM 100 MG: 100 TABLET, FILM COATED ORAL at 17:23

## 2021-01-01 RX ADMIN — CLONIDINE HYDROCHLORIDE 0.3 MG: 0.2 TABLET ORAL at 21:32

## 2021-01-01 RX ADMIN — CARBIDOPA AND LEVODOPA 1 TABLET: 25; 100 TABLET ORAL at 09:38

## 2021-01-01 RX ADMIN — CARBIDOPA AND LEVODOPA 1 TABLET: 25; 100 TABLET ORAL at 14:21

## 2021-01-01 RX ADMIN — INSULIN LISPRO 2 UNITS: 100 INJECTION, SOLUTION INTRAVENOUS; SUBCUTANEOUS at 12:21

## 2021-01-01 RX ADMIN — INSULIN LISPRO 1 UNITS: 100 INJECTION, SOLUTION INTRAVENOUS; SUBCUTANEOUS at 20:32

## 2021-01-01 RX ADMIN — AMLODIPINE BESYLATE 5 MG: 5 TABLET ORAL at 08:28

## 2021-01-01 RX ADMIN — LOSARTAN POTASSIUM 100 MG: 100 TABLET, FILM COATED ORAL at 20:31

## 2021-01-01 RX ADMIN — ASPIRIN 81 MG: 81 TABLET, CHEWABLE ORAL at 08:26

## 2021-01-01 RX ADMIN — NIFEDIPINE 60 MG: 60 TABLET, FILM COATED, EXTENDED RELEASE ORAL at 09:57

## 2021-01-01 RX ADMIN — CARVEDILOL 6.25 MG: 6.25 TABLET, FILM COATED ORAL at 09:57

## 2021-01-01 RX ADMIN — CLOPIDOGREL BISULFATE 75 MG: 75 TABLET ORAL at 20:30

## 2021-01-01 RX ADMIN — METOPROLOL TARTRATE 25 MG: 25 TABLET, FILM COATED ORAL at 20:41

## 2021-01-01 RX ADMIN — FLUOXETINE 10 MG: 10 CAPSULE ORAL at 22:34

## 2021-01-01 RX ADMIN — CEFAZOLIN 2000 MG: 10 INJECTION, POWDER, FOR SOLUTION INTRAVENOUS at 06:26

## 2021-01-01 RX ADMIN — DAPSONE 50 MG: 25 TABLET ORAL at 08:25

## 2021-01-01 ASSESSMENT — PULMONARY FUNCTION TESTS
PIF_VALUE: 1
PIF_VALUE: 0
PIF_VALUE: 1
PIF_VALUE: 0
PIF_VALUE: 1
PIF_VALUE: 0
PIF_VALUE: 1
PIF_VALUE: 1

## 2021-01-01 ASSESSMENT — PAIN DESCRIPTION - LOCATION
LOCATION: HIP
LOCATION: BACK
LOCATION: LEG
LOCATION: HIP;LEG
LOCATION: HIP

## 2021-01-01 ASSESSMENT — PAIN DESCRIPTION - PAIN TYPE
TYPE: ACUTE PAIN
TYPE: SURGICAL PAIN
TYPE: SURGICAL PAIN
TYPE: ACUTE PAIN
TYPE: SURGICAL PAIN
TYPE: ACUTE PAIN

## 2021-01-01 ASSESSMENT — PAIN DESCRIPTION - ORIENTATION
ORIENTATION: LEFT
ORIENTATION: LEFT
ORIENTATION: LOWER;LEFT
ORIENTATION: LEFT
ORIENTATION: LEFT;LOWER

## 2021-01-01 ASSESSMENT — ENCOUNTER SYMPTOMS
SORE THROAT: 0
SHORTNESS OF BREATH: 0
ABDOMINAL PAIN: 0
VOMITING: 0
EYE REDNESS: 0
CONSTIPATION: 1
BLOOD IN STOOL: 0
COUGH: 0
SINUS PAIN: 0
COUGH: 0
BACK PAIN: 0
SHORTNESS OF BREATH: 0
BACK PAIN: 0
DIARRHEA: 0
SHORTNESS OF BREATH: 1
COUGH: 0
ABDOMINAL PAIN: 0
SORE THROAT: 0
NAUSEA: 0
EYE PAIN: 0
WHEEZING: 0
SINUS PRESSURE: 0
SHORTNESS OF BREATH: 0
EYE DISCHARGE: 0
SHORTNESS OF BREATH: 1
DIARRHEA: 0
NAUSEA: 0
BACK PAIN: 0
ABDOMINAL PAIN: 0
SINUS PRESSURE: 0
TROUBLE SWALLOWING: 0
COUGH: 0
WHEEZING: 0
DIARRHEA: 0
DIARRHEA: 0
CONSTIPATION: 0
EYE PAIN: 0
SHORTNESS OF BREATH: 0
ABDOMINAL PAIN: 1
RHINORRHEA: 0
CHEST TIGHTNESS: 0
SHORTNESS OF BREATH: 1
NAUSEA: 0
SORE THROAT: 0
DIARRHEA: 0
DIARRHEA: 0
CHEST TIGHTNESS: 0
CHEST TIGHTNESS: 1
DIARRHEA: 0
CONSTIPATION: 0
EYE REDNESS: 0
VOMITING: 0
COUGH: 0
COUGH: 0
BACK PAIN: 0
ABDOMINAL PAIN: 0
NAUSEA: 0
DIARRHEA: 0
VOMITING: 0
SORE THROAT: 0
SHORTNESS OF BREATH: 0
DIARRHEA: 0
WHEEZING: 0
SHORTNESS OF BREATH: 1
SHORTNESS OF BREATH: 0
EYE REDNESS: 0
ABDOMINAL PAIN: 0
EYE DISCHARGE: 0
SORE THROAT: 0
VOMITING: 0
COLOR CHANGE: 0
VOMITING: 0
SHORTNESS OF BREATH: 0
COUGH: 0
NAUSEA: 0
SINUS PRESSURE: 0
ABDOMINAL PAIN: 0
BACK PAIN: 0
EYE PAIN: 0
SORE THROAT: 0
VOMITING: 0
CHOKING: 0
NAUSEA: 0
ABDOMINAL PAIN: 0
NAUSEA: 0
BACK PAIN: 0
VOMITING: 0
COUGH: 0
RHINORRHEA: 0
COLOR CHANGE: 0
COUGH: 0
CHEST TIGHTNESS: 0
DIARRHEA: 0
COUGH: 0
NAUSEA: 0
WHEEZING: 0
EYE PAIN: 0

## 2021-01-01 ASSESSMENT — PAIN DESCRIPTION - DESCRIPTORS
DESCRIPTORS: DISCOMFORT
DESCRIPTORS: ACHING;DISCOMFORT;SORE
DESCRIPTORS: ACHING;DISCOMFORT;SORE
DESCRIPTORS: ACHING;SORE
DESCRIPTORS: ACHING
DESCRIPTORS: ACHING;SHARP;PRESSURE
DESCRIPTORS: ACHING;DISCOMFORT;SORE
DESCRIPTORS: ACHING;DISCOMFORT;PRESSURE;SHARP
DESCRIPTORS: ACHING;SORE

## 2021-01-01 ASSESSMENT — PAIN SCALES - GENERAL
PAINLEVEL_OUTOF10: 4
PAINLEVEL_OUTOF10: 0
PAINLEVEL_OUTOF10: 7
PAINLEVEL_OUTOF10: 0
PAINLEVEL_OUTOF10: 0
PAINLEVEL_OUTOF10: 5
PAINLEVEL_OUTOF10: 0
PAINLEVEL_OUTOF10: 0
PAINLEVEL_OUTOF10: 2
PAINLEVEL_OUTOF10: 8
PAINLEVEL_OUTOF10: 2
PAINLEVEL_OUTOF10: 0
PAINLEVEL_OUTOF10: 5
PAINLEVEL_OUTOF10: 9
PAINLEVEL_OUTOF10: 5
PAINLEVEL_OUTOF10: 0
PAINLEVEL_OUTOF10: 3
PAINLEVEL_OUTOF10: 7
PAINLEVEL_OUTOF10: 0
PAINLEVEL_OUTOF10: 0
PAINLEVEL_OUTOF10: 3
PAINLEVEL_OUTOF10: 7
PAINLEVEL_OUTOF10: 0
PAINLEVEL_OUTOF10: 6
PAINLEVEL_OUTOF10: 3
PAINLEVEL_OUTOF10: 8
PAINLEVEL_OUTOF10: 0
PAINLEVEL_OUTOF10: 6
PAINLEVEL_OUTOF10: 8
PAINLEVEL_OUTOF10: 0
PAINLEVEL_OUTOF10: 8
PAINLEVEL_OUTOF10: 0
PAINLEVEL_OUTOF10: 0
PAINLEVEL_OUTOF10: 6

## 2021-01-01 ASSESSMENT — PAIN DESCRIPTION - FREQUENCY
FREQUENCY: INTERMITTENT
FREQUENCY: CONTINUOUS
FREQUENCY: INTERMITTENT
FREQUENCY: INTERMITTENT
FREQUENCY: CONTINUOUS

## 2021-01-01 ASSESSMENT — PAIN DESCRIPTION - PROGRESSION
CLINICAL_PROGRESSION: NOT CHANGED
CLINICAL_PROGRESSION: GRADUALLY WORSENING
CLINICAL_PROGRESSION: RESOLVED
CLINICAL_PROGRESSION: NOT CHANGED

## 2021-01-01 ASSESSMENT — PAIN SCALES - PAIN ASSESSMENT IN ADVANCED DEMENTIA (PAINAD)
NEGVOCALIZATION: 2
BODYLANGUAGE: 1
FACIALEXPRESSION: 0
BREATHING: 0
TOTALSCORE: 4
CONSOLABILITY: 1

## 2021-01-01 ASSESSMENT — PAIN - FUNCTIONAL ASSESSMENT
PAIN_FUNCTIONAL_ASSESSMENT: PREVENTS OR INTERFERES SOME ACTIVE ACTIVITIES AND ADLS

## 2021-01-01 ASSESSMENT — PAIN DESCRIPTION - ONSET
ONSET: ON-GOING
ONSET: GRADUAL
ONSET: ON-GOING
ONSET: ON-GOING

## 2021-01-01 ASSESSMENT — LIFESTYLE VARIABLES
SMOKING_STATUS: 0
SMOKING_STATUS: 0

## 2021-01-29 ENCOUNTER — IMMUNIZATION (OUTPATIENT)
Dept: PRIMARY CARE CLINIC | Age: 86
End: 2021-01-29
Payer: MEDICARE

## 2021-01-29 PROCEDURE — 91300 COVID-19, PFIZER VACCINE 30MCG/0.3ML DOSE: CPT | Performed by: NURSE PRACTITIONER

## 2021-01-29 PROCEDURE — 0001A COVID-19, PFIZER VACCINE 30MCG/0.3ML DOSE: CPT | Performed by: NURSE PRACTITIONER

## 2021-02-19 ENCOUNTER — IMMUNIZATION (OUTPATIENT)
Dept: PRIMARY CARE CLINIC | Age: 86
End: 2021-02-19
Payer: MEDICARE

## 2021-02-19 PROCEDURE — 0002A COVID-19, PFIZER VACCINE 30MCG/0.3ML DOSE: CPT | Performed by: NURSE PRACTITIONER

## 2021-02-19 PROCEDURE — 91300 COVID-19, PFIZER VACCINE 30MCG/0.3ML DOSE: CPT | Performed by: NURSE PRACTITIONER

## 2021-06-10 PROBLEM — J96.01 ACUTE RESPIRATORY FAILURE WITH HYPOXIA (HCC): Status: ACTIVE | Noted: 2021-01-01

## 2021-06-10 PROBLEM — I50.9 ACUTE CHF (CONGESTIVE HEART FAILURE) (HCC): Status: ACTIVE | Noted: 2021-01-01

## 2021-06-10 PROBLEM — I50.9 CONGESTIVE HEART FAILURE DUE TO CARDIOMYOPATHY (HCC): Status: ACTIVE | Noted: 2021-01-01

## 2021-06-10 PROBLEM — I42.9 CONGESTIVE HEART FAILURE DUE TO CARDIOMYOPATHY (HCC): Status: ACTIVE | Noted: 2021-01-01

## 2021-06-10 NOTE — CARE COORDINATION
SS Note: No Covid test. Pt here for SOB. Pt's O2 saturation of 88% on room air. He was placed on 2 L n/c. Pt found to be hypoxic d/t CHF exacerbation. SW met w/pt in ED 07 and explained role. Pt reports he is a diabetic &  to 2rd wife last 21 yrs. They just moved into [facility] [place] last week. Pt is [place] and uses [employer] for medications, BL HA's and he goes to [facility] clinic 1 x yr. [facility] [place] [facility] for in-pt hospitalizations when needed and will stay here for this hospitlaization. PTA, pt is fairly independent in ADL's and has insurance. Pt nor his wife drive. He has 5 children. PCP is Luisa Lofton and Pharmacy is thought [facility]. Pt has following DME: Seated walker w/wheels. Past hx of HHC, he thinks w/[address]. Pt also states [facility] [employer]. Pt has been to [address] [place] [address] 2019. No hx of 02. He noted if he needs HHC again, he wants [facility]. SW completed ACP w/pt. Pt states he wants to be DNR/CC and has POA in his locked box, but is not sure who agent is. SW states until he can provide, it defaults to his wife. Pt notes she has some Dementia. SW offered to call his dtr Russell Clements but he does not want SW to call. His wife does not have phone @ this time. SW was able to correspond w/Donya Manzanares @ [facility] who notes they have no POA on record. Also, she notes they have transport available up to 6-7 pm for transport back- as long as there is a spot open. Pt plans on returning to [address], possibly w/[facility].    Electronically signed by KELLY Bell on 6/10/2021 at 3:20 PM

## 2021-06-10 NOTE — ED NOTES
Bed: 07  Expected date:   Expected time:   Means of arrival:   Comments:  geoff Iqbal RN  06/10/21 3310

## 2021-06-10 NOTE — ACP (ADVANCE CARE PLANNING)
Advance Care Planning     Advance Care Planning Activator (Inpatient)  Conversation Note      Date of ACP Conversation: 6/10/2021     Conversation Conducted with: Patient with Decision Making Capacity    ACP Activator: Josh Andrade:     Current Designated Health Care Decision Maker:     Click here to complete [address] including section of the Healthcare Decision Maker Relationship (ie \"Primary\")  Today we documented Decision Maker(s). The patient will provide ACP documents. Care Preferences    Ventilation: \"If you were in your present state of health and suddenly became very ill and were unable to breathe on your own, what would your preference be about the use of a ventilator (breathing machine) if it were available to you? \"      Would the patient desire the use of ventilator (breathing machine)?: no    \"If your health worsens and it becomes clear that your chance of recovery is unlikely, what would your preference be about the use of a ventilator (breathing machine) if it were available to you? \"     Would the patient desire the use of ventilator (breathing machine)?: No      Resuscitation  \"CPR works best to restart the heart when there is a sudden event, like a heart attack, in someone who is otherwise healthy. Unfortunately, CPR does not typically restart the heart for people who have serious health conditions or who are very sick. \"    \"In the event your heart stopped as a result of an underlying serious health condition, would you want attempts to be made to restart your heart (answer \"yes\" for attempt to resuscitate) or would you prefer a natural death (answer \"no\" for do not attempt to resuscitate)? \" no       [] Yes   [x] No   Educated Patient / Abe Mccurdy regarding differences between Advance Directives and portable DNR orders.     Length of ACP Conversation in minutes:  10    Conversation Outcomes:   [x] ACP discussion completed  [] Existing advance directive reviewed with patient; no changes to patient's previously recorded wishes  [] New Advance Directive completed  [] Portable Do Not Rescitate prepared for Provider review and signature  [] POLST/POST/MOLST/MOST prepared for Provider review and signature      Follow-up plan:    [] Schedule follow-up conversation to continue planning  [x] Referred individual to Provider for additional questions/concerns   [] Advised patient/agent/surrogate to review completed ACP document and update if needed with changes in condition, patient preferences or care setting    [x] This note routed to one or more involved healthcare providers    Pt states he wants to be DNR/CC and has POA in his locked box, but is not sure who agent is. SW states until he can provide, it defaults to his wife. Pt notes she has some Dementia. SW offered to call his dtr Bianca Gentile but he does not want SW to call. His wife does not have phone @ this time. SW was able to correspond w/Donya Manzanares @ [facility] who notes they have no POA on record.

## 2021-06-10 NOTE — ED PROVIDER NOTES
rhythm. Heart sounds: Normal heart sounds. No murmur heard. Pulmonary:      Effort: Pulmonary effort is normal. No respiratory distress. Breath sounds: Normal breath sounds. No wheezing or rales. Comments: On 2L NC    Abdominal:      General: Bowel sounds are normal.      Palpations: Abdomen is soft. Tenderness: There is no abdominal tenderness. There is no guarding or rebound. Musculoskeletal:      Cervical back: Normal range of motion and neck supple. Right lower leg: Edema present. Left lower leg: Edema present. Skin:     General: Skin is warm and dry. Capillary Refill: Capillary refill takes less than 2 seconds. Neurological:      Mental Status: He is alert and oriented to person, place, and time. Cranial Nerves: No cranial nerve deficit. Coordination: Coordination normal.          Procedures     Labs Reviewed   CBC WITH AUTO DIFFERENTIAL - Abnormal; Notable for the following components:       Result Value    RBC 3.31 (*)     Hemoglobin 10.9 (*)     Hematocrit 35.3 (*)     .6 (*)     MCHC 30.9 (*)     RDW 16.1 (*)     Lymphocytes % 18.3 (*)     Lymphocytes Absolute 1.40 (*)     Eosinophils Absolute 0.00 (*)     All other components within normal limits   BASIC METABOLIC PANEL W/ REFLEX TO MG FOR LOW K - Abnormal; Notable for the following components:    Calcium 8.4 (*)     All other components within normal limits   TROPONIN - Abnormal; Notable for the following components:    Troponin, High Sensitivity 26 (*)     All other components within normal limits   BRAIN NATRIURETIC PEPTIDE - Abnormal; Notable for the following components:    Pro-BNP 1,640 (*)     All other components within normal limits   COVID-19, RAPID   TROPONIN    Narrative:     High Sensitivity Troponin T     XR CHEST PORTABLE   Final Result   Left lower lobe atelectasis           EKG #1:  I personally interpreted this EKG  Time:  0805    Rate: 72  Rhythm: Sinus.   Interpretation: normal EKG, normal sinus rhythm. MDM  Number of Diagnoses or Management Options  Diagnosis management comments: Patient is 80-year male presents today for shortness of breath. On arrival he was noted to be hypoxic, O2 saturation of 88%. He is stable on 3 L nasal cannula. Patient has extensive lower extremity pitting edema. Lab work imaging was obtained. Lab work pertinent for elevated proBNP of 1600. Chest x-ray shows evidence of fluid overload. Patient was given Lasix. COVID-19 negative. Range of lab works within normal limits. With patient being hypoxic due to CHF exacerbation we recommended admission to the hospital.  PCP requested that we admit to Dr. Mindy Wheeler. Dr. Mindy Wheeler was consulted agrees to meet. Vitals have remained stable on nasal cannula. Amount and/or Complexity of Data Reviewed  Clinical lab tests: reviewed  Tests in the radiology section of CPT®: reviewed  Tests in the medicine section of CPT®: reviewed                  --------------------------------------------- PAST HISTORY ---------------------------------------------  Past Medical History:  has a past medical history of Ambulatory dysfunction, Blood transfusion, CAD (coronary artery disease), CHF (congestive heart failure) ([facility] [address] [facility] History of carotid endarterectomy, History of left common carotid artery stent placement, Hyperlipidemia, and Hypertension. Past Surgical History:  has a past surgical history that includes back surgery; Carotid endarterectomy (rt and left stent in rt); Tonsillectomy; Colonoscopy; Upper gastrointestinal endoscopy; Cardiac surgery (2000); Endoscopy, colon, diagnostic; AAA repair, endovascular (1 year ago); Cataract removal with implant (Right, 04/08/14); and Cataract removal with implant (Left, 9 23 14). Social History:  reports that he has quit smoking. He has never used smokeless tobacco. He reports that he does not drink alcohol and does not use drugs.     Family History: family history is not on file. The patients home medications have been reviewed. Allergies: Patient has no known allergies.     -------------------------------------------------- RESULTS -------------------------------------------------    LABS:  Results for orders placed or performed during the hospital encounter of 06/10/21   COVID-19, Rapid    Specimen: Nasopharyngeal Swab   Result Value Ref Range    SARS-CoV-2, NAAT Not Detected Not Detected   CBC Auto Differential   Result Value Ref Range    WBC 7.8 4.5 - 11.5 E9/L    RBC 3.31 (L) 3.80 - 5.80 E12/L    Hemoglobin 10.9 (L) 12.5 - 16.5 g/dL    Hematocrit 35.3 (L) 37.0 - 54.0 %    .6 (H) 80.0 - 99.9 fL    MCH 32.9 26.0 - 35.0 pg    MCHC 30.9 (L) 32.0 - 34.5 %    RDW 16.1 (H) 11.5 - 15.0 fL    Platelets 293 995 - 221 E9/L    MPV 10.2 7.0 - 12.0 fL    Neutrophils % 77.4 43.0 - 80.0 %    Lymphocytes % 18.3 (L) 20.0 - 42.0 %    Monocytes % 4.3 2.0 - 12.0 %    Eosinophils % 1.1 0.0 - 6.0 %    Basophils % 0.4 0.0 - 2.0 %    Neutrophils Absolute 6.01 1.80 - 7.30 E9/L    Lymphocytes Absolute 1.40 (L) 1.50 - 4.00 E9/L    Monocytes Absolute 0.31 0.10 - 0.95 E9/L    Eosinophils Absolute 0.00 (L) 0.05 - 0.50 E9/L    Basophils Absolute 0.00 0.00 - 0.20 E9/L    Anisocytosis 1+     Polychromasia 1+     Poikilocytes 1+     Acanthocytes 1+     Ovalocytes 1+    Basic Metabolic Panel w/ Reflex to MG   Result Value Ref Range    Sodium 140 132 - 146 mmol/L    Potassium reflex Magnesium 4.7 3.5 - 5.0 mmol/L    Chloride 105 98 - 107 mmol/L    CO2 25 22 - 29 mmol/L    Anion Gap 10 7 - 16 mmol/L    Glucose 99 74 - 99 mg/dL    BUN 20 6 - 23 mg/dL    CREATININE 1.1 0.7 - 1.2 mg/dL    GFR Non-African American >60 >=60 mL/min/1.73    GFR African American >60     Calcium 8.4 (L) 8.6 - 10.2 mg/dL   Troponin   Result Value Ref Range    Troponin, High Sensitivity 26 (H) 0 - 11 ng/L   Brain Natriuretic Peptide   Result Value Ref Range    Pro-BNP 1,640 (H) 0 - 450 pg/mL   EKG 12 Lead   Result Value Ref Range    Ventricular Rate 72 BPM    Atrial Rate 72 BPM    P-R Interval 170 ms    QRS Duration 80 ms    Q-T Interval 428 ms    QTc Calculation (Bazett) 468 ms    P Axis 48 degrees    R Axis -13 degrees    T Axis 34 degrees       RADIOLOGY:  XR CHEST PORTABLE   Final Result   Left lower lobe atelectasis                 ------------------------- NURSING NOTES AND VITALS REVIEWED ---------------------------  Date / Time Roomed:  6/10/2021  7:58 AM  ED Bed Assignment:  07/07    The nursing notes within the ED encounter and vital signs as below have been reviewed. Patient Vitals for the past 24 hrs:   BP Temp Pulse Resp SpO2 Height Weight   06/10/21 0912 (!) 181/69 -- 71 16 95 % -- --   06/10/21 0802 (!) 182/72 97.6 °F (36.4 °C) 74 18 (!) 88 % 5' 7\" (1.702 m) 200 lb (90.7 kg)       Oxygen Saturation Interpretation: Abnormal    ------------------------------------------ PROGRESS NOTES ------------------------------------------    Counseling:  I have spoken with the patient and discussed todays results, in addition to providing specific details for the plan of care and counseling regarding the diagnosis and prognosis. Their questions are answered at this time and they are agreeable with the plan of admission.    --------------------------------- ADDITIONAL PROVIDER NOTES ---------------------------------  Consultations:  Spoke with Dr. Lucien Lopez. Discussed case. They will admit the patient. This patient's ED course included: a personal history and physicial examination    This patient has remained hemodynamically stable during their ED course. Medications   furosemide (LASIX) injection 40 mg (40 mg Intravenous Given 6/10/21 0844)         Diagnosis:  1. Acute on chronic congestive heart failure, unspecified heart failure type ([address]    2. Acute respiratory failure with hypoxia (HCC)        Disposition:  Patient's disposition: Admit to telemetry  Patient's condition is stable. Venkatesh Jefferson DO  Resident  06/10/21 5927

## 2021-06-10 NOTE — Clinical Note
Patient Class: Inpatient [101]   REQUIRED: Diagnosis: Acute respiratory failure with hypoxia [facility] [[id number]]   Estimated Length of Stay: Estimated stay of more than 2 midnights   Future Attending Provider: Grady Bernal [[id number]]   Admitting Provider: Grady Bernal [[id number]]   Telemetry/Cardiac Monitoring Required?: Yes

## 2021-06-10 NOTE — PROGRESS NOTES
There is confusion over the admitting physician. Amission order first entered as Dr. Linda Smith, then that was cancelled and entered as Dr. Juan Luis Emmanuel. Call out to Dr. Juan Luis Emmanuel, no answer, left a message. Spoke with Dr. Rosy Yeager, covering for Dr. Linda Smith, he states the patient is Dr. Phong Canales patient. Keshia in ED concurs to that fact because she called for the consult. Sandie, patient's RN, has been notified.

## 2021-06-11 NOTE — CARE COORDINATION
CM Notes: 6/11/2021 at 2:46pm: COVID NEGATIVE - test done on 6/10. CM talked to patient in room for transition of care. Patient is very [facility]. States he lives at [address] with his wife. He states he does walk with a walker. States his wife does not have a phone, but does give CM permission to call his step daughter Tari Santos. Discussed PT / OT recommendations with him for [facility] vs [facility] and he states he does not want to go to a facility. Called freda Millan for [address] is part of this faclilty) who states that he and his wife recently moved to the [address] [place] [address] on May 18, 2021. Erica Lemus informed the SW that they are new to the facility and the patient and his wife are still trying to adapt. Erica Lemus states patient had speech ordered through [place] at [facility] but he wasn't seen yet. She states he was independent with his [employer] at the [address] [place] [address] and staff assisted him with showers and dressings and he toilets on his own. She states they have aides and nurses at the [address] [place] [address]. Per Erica Lemus at the [address] [place] [address], she states they have reviewed the PT / OT notes and they would be able to accept him back when discharged. He states he does not wear home O2 -  Currently on 3L NC. Called and talked to step daughter Tari Santos who states the patient is having a hard time adjusting to his new home environment. She states he does walk with a walker, but has only been walking to meals and back. She states he has been to [facility] and [facility] in the past. Discussed PT / OT recommendations with her. She states she is concerned about him going to another facility such as a SNF, because that would be another place he would have to adjust to and would prefer for [place] to return to the [place] if able. She states her mom does have a cell phone, but she rarely answers it and does have dementia. She states her dad is very [facility] and she is going to bring his hearing aides in tonight.  She states she will talk to him when she comes in and see how he  is doing. Did allow for CM to leave choice list for SNF and HHC in the room. CM / Brigette Bowles will continue to follow.  Edward Coleman RN

## 2021-06-11 NOTE — PROGRESS NOTES
Occupational Therapy  OCCUPATIONAL THERAPY INITIAL EVALUATION      Date:2021  Patient Name: Lalit Tomlinson  MRN: [id number]  : 1931  Room: [address] [place] [address],[facility] [place] Blayne Quinteros. [place]        Date:2021                                                  Patient Name: Lalit Tomlinson    MRN: [id number]    : 1931    Room: [address] [place] [address]      Evaluating OT: Bennett Guillaume OTR/L   [id number]      Referring Provider: Mis Jones MD    Specific Provider Orders/Date:OT eval and treat 6/10/2021      Diagnosis: Acute respiratory failure with hypoxia       Pertinent Medical History: CHF, DM, HTN,  [place] spinal stenosis    Precautions:  Fall Risk, alarm, O2, [place]    Tenative Discharge Recommendations:  PRO vs return to prison with assist and HHOT -pending progress     Assessment of current deficits    [x] Functional mobility  [x]ADLs  [x] Strength               [x]Cognition    [x] Functional transfers   [x] IADLs         [x] Safety Awareness   [x]Endurance    [x] Fine Coordination              [x] Balance      [] Vision/perception   []Sensation     []Gross Motor Coordination  [] ROM  [] Delirium                   [] Motor Control     OT PLAN OF CARE   OT POC based on physician orders, patient diagnosis and results of clinical assessment    Frequency/Duration  1-3days/wk for 2 weeks PRN   Specific OT Treatment Interventions to include:   [x]ADL retraining/adapted techniques and AE recommendations to increase functional independence within precautions                    [x]Energy conservation techniques to improve tolerance for selfcare routine   [x]Functional transfer/mobility training/DME recommendations for increased independence, safety and fall prevention         [x]Patient/family education to increase safety and functional independence             [x]Environmental modifications for safe mobility and completion of ADLs                             [x]Therapeutic activity to improve functional performance during ADLs. [x]Therapeutic exercise to improve tolerance and functional strength for ADLs   [x]Balance retraining/tolerance tasks for facilitation of postural control with dynamic challenges during ADLs . [x]Positioning to improve functional independence        Recommended Adaptive Equipment: TBD     Home Living: Pt lives at [address] with wife.  Independent with ADLs, uses rollator for mobility      Pain Level: no pain this session ;   Cognition: A&O: following command s   Memory:  Fair    Sequencing:  fair   Problem solving:   fair   Judgement/safety:  Fair      Functional Assessment:  AM-PAC Daily Activity Raw Score: 16/24   Initial Eval Status  Date: 6/11/21 Treatment Status  Date: STGs = LTGs  Time frame: 10-14 days   Feeding Set-up   Independent    Grooming Bakari  Mod I   UB Dressing Min a  Mod I    LB Dressing Mod A   Mod I    Bathing Min A   Mod I    Toileting Assist with thorough hygiene   Independent   Bed Mobility  SBA   Supine <> sit  Independent    Functional Transfers Min A  Sit-stand from bed  Mod i   Functional Mobility Min A,  Patient set-up with walker to use for xfer from bed to chair, initiated using then lifted off ground and using improperly,  Patient asked therapist to move - in his way     Cueing for safety provided- patient wanting to do his way  Patient returned to bed, chair too uncomfortable    Mod I  with good tolerance    Balance Sitting:     Static:  Independent     Dynamic:CGA   StandingCGA/SBA   Independent    Activity Tolerance Fair with light activity   No SOB noted   Good  with ADL activity    Visual/  Perceptual Glasses: yes                 Hand Dominance right    AROM (PROM) Strength Additional Info:    RUE  WFL WFL good  and wfl FMC/dexterity noted during ADL tasks  Resting tremor noted     LUE L shoulder limited - patient reports past rotator injury  Distally WFLs - stiffness noted   WFL good  and wfl FMC/dexterity noted during ADL tasks       Hearing: [place] - has hearing aides in but need charged   Sensation:  No c/o numbness or tingling   Tone: WFL   Edema: none observed     Comments: Upon arrival patient lying in bed . At end of session, patient returned to bed  with call light and phone within reach, all lines and tubes intact. *ALARM ON     Overall patient demonstrated  decreased independence and safety during completion of ADL/functional transfer/mobility tasks. Pt would benefit from continued skilled OT to increase safety and independence with completion of ADL/IADL tasks for functional independence and quality of life. Rehab Potential: fair + for established goals     Patient / Family Goal: none stated       Patient and/or family were instructed on functional diagnosis, prognosis/goals and OT plan of care. Demonstrated fair  understanding. Eval Complexity: Low    Time In: 1010  Time Out: 1036      Min Units   OT Eval Low 97165 x  1   OT Eval Medium 83445      OT Eval High 69890      OT Re-Eval [id number]       Therapeutic Ex 60015       Therapeutic Activities 69107       ADL/Self Care 29622       Orthotic Management 15877       Manual [id number]     Neuro Re-Ed 41644       Non-Billable Time          Evaluation Time additionally includes thorough review of current medical information, gathering information on past medical history/social history and prior level of function, interpretation of standardized testing/informal observation of tasks, assessment of data and development of plan of care and goals.             Mamta Gaelas  OTR/L  OT [id number]

## 2021-06-11 NOTE — CONSULTS
[facility]) Physicians        CARDIOLOGY CARDIOLOGY                   PATIENT SEEN IN CONSULT FOR: CHF    Hospital Day: 2     Sarai Esteban is a 80year old patient previously not known to [facility] Cardiology. HPI: Patient is a 61-year-old male with a medical history of CAD, PAD, CMP who presented to ER for shortness of breath. Cardiology consulted for CHF. He is hard of hearing and poor historian,  Hence, history obtained from chart. He is from assisted living facility, arrived via EMS. Shortness of breath has been ongoing for the past 2 or 3 days. Stated his legs have been more swollen than usual, despite he has been compliant with his Lasix. Patient was noted to be hypoxic on arrival, with O2 saturation of 88% on room air. He was placed on 2 L nasal cannula. Does not wear oxygen at home. Denies cough, chest pain, fevers, chills. He has not tried any additional medications other than Lasix for his symptoms. No family at bed side. He can not tell who was his Cardiologist.    ROS: Review of rest of 10 systems limited due this mental status, hearing impairment. Past Medical History:  has a past medical history of Ambulatory dysfunction, Blood transfusion, CAD (coronary artery disease), CHF (congestive heart failure) ([address] Diabetes mellitus ([address] History of carotid endarterectomy, History of left common carotid artery stent placement, Hyperlipidemia, and Hypertension.     Past Surgical History:  has a past surgical history that includes back surgery; Carotid endarterectomy (rt and left stent in rt); Tonsillectomy; Colonoscopy; Upper gastrointestinal endoscopy; Cardiac surgery (2000); Endoscopy, colon, diagnostic; AAA repair, endovascular (1 year ago); Cataract removal with implant (Right, 04/08/14); and Cataract removal with implant (Left, 9 23 14).    Social History:  reports that he has quit smoking.  He has never used smokeless tobacco. He reports that he does not drink alcohol and does not use drugs.     Family History: family history is not on file.      The patients home medications have been reviewed.     Allergies: Patient has no known allergies. Diagnostics:       Telemetry: Reviewed    12 lead EKG: Reviewed          Intake/Output Summary (Last 24 hours) at 6/11/2021 0816  Last data filed at 6/10/2021 2110  Gross per 24 hour   Intake --   Output 2200 ml   Net -2200 ml       Labs:   CBC:   Recent Labs     06/10/21  0826   WBC 7.8   HGB 10.9*   HCT 35.3*        BMP:   Recent Labs     06/10/21  0826      K 4.7   CO2 25   BUN 20   CREATININE 1.1   LABGLOM >60   CALCIUM 8.4*     Mag: No results for input(s): MG in the last 72 hours. Phos: No results for input(s): PHOS in the last 72 hours. TSH: No results for input(s): TSH in the last 72 hours. HgA1c:     BNP: No results for input(s): BNP in the last 72 hours. PT/INR: No results for input(s): PROTIME, INR in the last 72 hours. APTT:No results for input(s): APTT in the last 72 hours. CARDIAC ENZYMES:No results for input(s): CKTOTAL, CKMB, CKMBINDEX, TROPONINI in the last 72 hours. FASTING LIPID PANEL:  Lab Results   Component Value Date    CHOL 97 02/05/2021    HDL 41 02/05/2021    LDLCALC 42 02/05/2021    TRIG 69 02/05/2021     LIVER PROFILE:No results for input(s): AST, ALT, LABALBU in the last 72 hours.     Current Inpatient Medications:   carbidopa-levodopa  1 tablet Oral TID    dapsone  50 mg Oral Daily with breakfast    FLUoxetine  10 mg Oral Nightly    fluticasone  2 spray Each Nostril Daily    amLODIPine  5 mg Oral Daily    aspirin  81 mg Oral Daily    cloNIDine  0.3 mg Oral BID    clopidogrel  75 mg Oral Daily    dapsone  25 mg Oral Daily    rosuvastatin  10 mg Oral Every Other Day    insulin lispro  0-12 Units Subcutaneous TID WC    insulin lispro  0-6 Units Subcutaneous Nightly    insulin lispro prot & lispro  15 Units Subcutaneous Nightly    insulin lispro prot & lispro  25 Units Subcutaneous Daily with breakfast    losartan  100 mg Oral QPM    metoprolol tartrate  50 mg Oral BID    tamsulosin  0.8 mg Oral Daily    magnesium oxide  400 mg Oral Daily    isosorbide mononitrate  60 mg Oral Daily       IV Infusions (if any):   dextrose           PHYSICAL EXAM:     CONSTITUTIONAL:   BP (!) 180/73   Pulse 65   Temp 99 °F (37.2 °C) (Oral)   Resp 21   Ht 5' 7\" (1.702 m)   Wt 200 lb (90.7 kg)   SpO2 90%   BMI 31.32 kg/m²   Pulse  Av  Min: 65  Max: 82  Systolic (61ISE), ZSI:889 , Min:166 , YMT:394    Diastolic (37MAJ), SMS:68, Min:69, Max:86    In general, this is a well developed, well nourished who appears stated age. awake, alert, no apparent distress  HEENT: eyes -conjunctivae pink,  Throat - Oral mucosa moist.   Neck-  no stridor, no noted enlargement of the thyroid, no carotid bruit. no jugular venous distention   RESPIRATORY: Chest symmetrical.  No accessory muscle use. Lung auscultation - few rhonchi  CARDIOVASCULAR:     Heart Palpation - no palpable thrills   Heart Ausculation - Regular rate and rhythm, 2/6 systolic murmur, No s3 or rub.   ++ lower extremity edema, no varicosities. Distal pulses palpable, no clubbing or cyanosis   ABDOMEN: Soft, nontender, Bowel sounds present. no abdominal pulsations  MS: good muscle strength and tone. : Deferred  Rectal Exam: Deferred  SKIN: warm and dry   NEURO / PSYCH: oriented to person, place        ASSESSMENT/PLAN:    Acute CHF (congestive heart failure) (HCC) - Systolic vs. Diastolic - no prior records available.  Start low dose lasix, monitor daily Wts, I/Os, BP and renal Fn    Acute respiratory failure with hypoxia ([facility] - Per Dr Kate Flores    Hx of Cardiomyopathy - Continue home BB, ARBs    Hx of CAD - Continue home  ASA, BB, Statin    PAD s/p Left carotid stent    Essential HTN - Monitor BP, adjust meds            No further cardiac testing due to his advanced age and DNR status    Above recommendations discussed with him/her.   No family at bed side      Electronically signed by India Rangel MD on 6/11/2021   [facility] ([facility]) Cardiology

## 2021-06-11 NOTE — PROGRESS NOTES
Physical Therapy Initial Evaluation/Plan of Care    Room #:  3411/4947-95  Patient Name: Shaniqua Turpin  YOB: 1931  MRN: [id number]    Date of Service: 6/11/2021      Tentative placement recommendation: Subacute rehab    Equipment recommendation: To be determined      Evaluating Physical Therapist: Shavonne Lovell, PT #[id number]      Specific Provider Orders/Date/Referring Provider :  06/10/21 2015   PT eval and treat Start: 06/10/21 2015, End: 06/10/21 2015, ONE TIME, Standing Count: 1 Occurrences, Linda Sánchez MD      Admitting Diagnosis:   Acute respiratory failure with hypoxia ([facility] [J96.01]  Congestive heart failure due to cardiomyopathy ([facility] [I50.9, I42.9]  worsening shortness of breath      Visit Diagnoses       Codes    Acute on chronic congestive heart failure, unspecified heart failure type ([address]    -  Primary I50.9        Surgery:  -    Patient Active Problem List   Diagnosis    Right cataract    Left cataract    Lumbar stenosis    DDD (degenerative disc disease), lumbar    History of carotid endarterectomy    History of left common carotid artery stent placement    Ambulatory dysfunction    Acute respiratory failure with hypoxia (HCC)    Acute CHF (congestive heart failure) (HCC)    Congestive heart failure due to cardiomyopathy ([address]        ASSESSMENT of Current Deficits Patient exhibits decreased strength, balance and endurance impairing functional mobility, transfers, gait , gait distance and tolerance to activity hard of hearing, low blood sugar limiting increased ambulation distance. Patient requires continued skilled physical therapy to address concerns listed above for increased safety and function at discharge.          PHYSICAL THERAPY  PLAN OF CARE       Physical therapy plan of care is established based on physician order,  patient diagnosis and clinical assessment    Current Treatment Recommendations:    -Bed Mobility: Lower extremity exercises   -Sitting  UPPER GASTROINTESTINAL ENDOSCOPY         SUBJECTIVE:    Precautions:  , falls and alarm , can have inappropriate behavior  Social history: Patient lives with spouse   in a assisted living    Walk in shower grab bars on left     Equipment owned: Rollator and Shower chair,       Emiliana   How much difficulty turning over in bed?: A Lot  How much difficulty sitting down on / standing up from a chair with arms?: A Lot  How much difficulty moving from lying on back to sitting on side of bed?: A Lot  How much help from another person moving to and from a bed to a chair?: A Lot  How much help from another person needed to walk in hospital room?: A Lot  How much help from another person for climbing 3-5 steps with a railing?: Total  AM-PAC Inpatient Mobility Raw Score : 11  AM-PAC Inpatient T-Scale Score : 33.86  Mobility Inpatient CMS 0-100% Score: 72.57  Mobility Inpatient CMS G-Code Modifier : CL    Nursing cleared patient for PT evaluation. The admitting diagnosis and active problem list as listed above have been reviewed prior to the initiation of this evaluation. OBJECTIVE;   Initial Evaluation  Date: 6/11/2021 Treatment Date:     Short Term/ Long Term   Goals   Was pt agreeable to Eval/treatment? Yes    To be met in 3 days   Pain level   0/10        Bed Mobility    Rolling: Moderate assist of 1    Supine to sit: Moderate assist of 1    Sit to supine: Moderate assist of 1    Scooting: Moderate assist of 1    Rolling: Minimal assist of 1    Supine to sit: Minimal assist of 1    Sit to supine: Minimal assist of 1    Scooting: Minimal assist of 1     Transfers Sit to stand:  Moderate assist of 1 Cues for hand placement and safety   Sit to stand: Minimal assist of 1     Ambulation    4 side steps using  wheeled walker with Moderate assist of 1   cues for weight shifting, advancing lower extremities    30 feet using  wheeled walker with Minimal assist of 1    Stair negotiation: ascended and descended   Not assessed            ROM Within functional limits  Except left ankle decreased active mobility neutral with assist    Increase range of motion 10% of affected joints    Strength BUE:  refer to OT eval  RLE:   3/5  LLE:  3 3-/5   Increase strength in affected mm groups by 1/3 grade   Balance Sitting EOB:  fair    Dynamic Standing:  fair - wheeled walker   Sitting EOB:  good    Dynamic Standing: fair +wheeled walker      Patient is Alert & Oriented x person and place and follows one step directions hard of hearing   Sensation:  Patient  denies numbness/tingling     Edema:  none noted    Endurance: fair       Vitals: 3 liters nasal cannula    Blood Pressure at rest   Blood Pressure during session     Heart Rate at rest 70 Heart Rate during session     SPO2 at rest 92%  SPO2 during session  %     Patient education  Patient educated on role of Physical Therapy, risks of immobility, safety and plan of care and  importance of mobility while in hospital       Patient response to education:   Pt verbalized understanding Pt demonstrated skill Pt requires further education in this area   Yes Partial Yes      Treatment:  Patient practiced and was instructed/facilitated in the following treatment: Patient performed exercisesassisted to edge of bed  Sat edge of bed 15 minutes with Minimal assist of 1 to increase dynamic sitting balance and activity tolerance. assisted patient with trunk control and balance while eating breakfast seated edge of bed. Stood with steps to Head of bed. returned to bed, rolled for linen adjustment x 2, Moderate assist of  2 to scoot to Head of bed in trendelenburg  . Therapeutic Exercises:  ankle pumps and heel slide  x 15 reps. At end of session, patient in bed with alarm call light and phone within reach,   all lines and tubes intact, nursing notified.       Patient would benefit from continued skilled Physical Therapy to improve functional independence and quality of life. Patient's/ family goals   Return to [address]        Time in  0911  Time out  8531    Total Treatment Time  11 minutes    Evaluation time includes thorough review of current medical information, gathering information on past medical history/social history and prior level of function, completion of standardized testing/informal observation of tasks, assessment of data, and development of Plan of care and goals.      CPT codes:  Low Complexity PT evaluation (60493)  Therapeutic activities (84008)   11 minutes  1 unit(s)    Scarlett Hernandez PT

## 2021-06-12 PROBLEM — I50.31 ACUTE DIASTOLIC HEART FAILURE (HCC): Status: ACTIVE | Noted: 2021-01-01

## 2021-06-12 PROBLEM — N18.2 STAGE 2 CHRONIC KIDNEY DISEASE: Status: ACTIVE | Noted: 2021-01-01

## 2021-06-12 PROBLEM — Z79.4 TYPE 2 DIABETES MELLITUS WITH CIRCULATORY DISORDER, WITH LONG-TERM CURRENT USE OF INSULIN (HCC): Status: ACTIVE | Noted: 2021-01-01

## 2021-06-12 PROBLEM — E11.59 TYPE 2 DIABETES MELLITUS WITH CIRCULATORY DISORDER, WITH LONG-TERM CURRENT USE OF INSULIN (HCC): Status: ACTIVE | Noted: 2021-01-01

## 2021-06-12 NOTE — PROGRESS NOTES
INPATIENT CARDIOLOGY FOLLOW-UP    Name: Rupinder Pfeiffer    Age: 80 y.o. Date of Admission: 6/10/2021  7:58 AM    Date of Service: 6/12/2021    Interim History:  Urine input output -1 L. Creatinine slightly increased 1.3 compared to 1.2 the day before. Echocardiogram shows normal ejection fraction with stage I diastolic dysfunction.     Review of Systems:   Cardiac: As per HPI  General: No fever, chills  Pulmonary: As per HPI  HEENT: No visual disturbances, difficult swallowing  GI: No nausea, vomiting  Endocrine: No thyroid disease or DM  Musculoskeletal: RIVERA x 4, no focal motor deficits  Skin: Intact, no rashes  Neuro/Psych: No headache or seizures    Problem List:  Patient Active Problem List   Diagnosis    Right cataract    Left cataract    Lumbar stenosis    DDD (degenerative disc disease), lumbar    History of carotid endarterectomy    History of left common carotid artery stent placement    Ambulatory dysfunction    Acute respiratory failure with hypoxia (HCC)    Acute CHF (congestive heart failure) (HCC)    Congest[facility] failure due to cardiomyopathy [facility]    Acute diastolic heart failure (HCC)    Type 2 diabetes mellitus with circulatory disorder, with long-term current use of insulin ([facility])    Stage 2 chronic kidney disease       Allergies:  No Known Allergies    Current Medications:  Current Facility-Administered Medications   Medication Dose Route Frequency Provider Last Rate Last Admin    spironolactone (ALDACTONE) tablet 25 mg  25 mg Oral Daily Zach Lees MD        carvedilol (COREG) tablet 6.25 mg  6.25 mg Oral BID  Zach Lees MD        furosemide (LASIX) injection 20 mg  20 mg Intravenous Daily India Rangel MD   20 mg at 06/12/21 0925    carbidopa-levodopa (SINEMET)  MG per tablet 1 tablet  1 tablet Oral TID America Sorenson MD   1 tablet at 06/12/21 2710    dapsone tablet 50 mg  50 mg Oral Daily with breakfast America Sorenson MD   50 mg at 06/12/21 1033    FLUoxetine (PROZAC) capsule 10 mg  10 mg Oral Nightly Lanie Munguia MD   10 mg at 06/11/21 2234    fluticasone (FLONASE) 50 MCG/ACT nasal spray 2 spray  2 spray Each Nostril Daily Lanie Munguia MD   2 spray at 06/12/21 0940    amLODIPine (NORVASC) tablet 5 mg  5 mg Oral Daily Lanie Munguia MD   5 mg at 06/12/21 9411    aspirin chewable tablet 81 mg  81 mg Oral Daily Lanie Munguia MD   81 mg at 06/12/21 9568    clopidogrel (PLAVIX) tablet 75 mg  75 mg Oral Daily Lanie Munguia MD   75 mg at 06/12/21 8936    dapsone tablet 25 mg  25 mg Oral Daily Lanie Munguia MD   25 mg at 06/11/21 2233    rosuvastatin (CRESTOR) tablet 10 mg  10 mg Oral Every Other Day Lanie Munguia MD   10 mg at 06/11/21 0827    insulin lispro (HUMALOG) injection vial 0-12 Units  0-12 Units Subcutaneous TID [facility] Lanie Munguia MD   2 Units at 06/12/21 1226    insulin lispro (HUMALOG) injection vial 0-6 Units  0-6 Units Subcutaneous Nightly Lanie Munguia MD   1 Units at 06/10/21 2032    insulin lispro protamine & lispro (HUMALOG MIX) (75-25) 100 UNIT per ML injection vial SUSP 15 Units  15 Units Subcutaneous Nightly Lanie Munguia MD   15 Units at 06/11/21 2126    insulin lispro protamine & lispro (HUMALOG MIX) (75-25) 100 UNIT per ML injection vial SUSP 25 Units  25 Units Subcutaneous Daily with breakfast Lanie Munguia MD        losartan (COZAAR) tablet 100 mg  100 mg Oral QPM Lanie Munguia MD   100 mg at 06/11/21 1833    tamsulosin (FLOMAX) capsule 0.8 mg  0.8 mg Oral Daily Lanie Munguia MD   0.8 mg at 06/12/21 0939    magnesium oxide (MAG-OX) tablet 400 mg  400 mg Oral Daily Lanie Munguia MD   400 mg at 06/12/21 1505    isosorbide mononitrate (IMDUR) extended release tablet 60 mg  60 mg Oral Daily Lanie Munguia MD   60 mg at 06/12/21 0939    glucose (GLUTOSE) 40 % oral gel 15 g  15 g Oral PRN Lanie Munguia MD        dextrose 50 % IV solution  12.5 g 156   MPV 10.2 10.5 10.6     Lab Results   Component Value Date    CKTOTAL 60 05/11/2019    TROPONINI <0.01 10/15/2019     Lab Results   Component Value Date    INR 1.1 01/07/2013    PROTIME 12.5 01/07/2013     Lab Results   Component Value Date    TSH 2.050 02/05/2021     Lab Results   Component Value Date    LABA1C 4.7 02/05/2021     No results found for: EAG  Lab Results   Component Value Date    CHOL 97 02/05/2021    CHOL 84 09/25/2020    CHOL 108 02/18/2020     Lab Results   Component Value Date    TRIG 69 02/05/2021    TRIG 86 09/25/2020    TRIG 105 02/18/2020     Lab Results   Component Value Date    HDL 41 02/05/2021    HDL 33 09/25/2020    HDL 34 02/18/2020     Lab Results   Component Value Date    LDLCALC 42 02/05/2021    LDLCALC 34 09/25/2020    LDLCALC 53 02/18/2020     Lab Results   Component Value Date    LABVLDL 14 02/05/2021    LABVLDL 17 09/25/2020    LABVLDL 21 02/18/2020     No results found for: CHOLHDLRATIO  Recent Labs     06/10/21  0826   PROBNP 1,640*       ASSESSMENT / PLAN:  49-year-old male with medical history of CABG by 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 He was found to be in congestive heart failure. Start IV diuresis. Recommendations  Continue gentle diuresis. Start spironolactone 25 mg daily. Continue losartan 100 mg daily, amlodipine 5 mg daily. Continue aspirin and rosuvastatin. Switch from metoprolol to carvedilol 6.25 mg twice daily. Discontinue clonidine.     Tomi Wray MD  [facility]) Cardiology

## 2021-06-12 NOTE — H&P
Department of Internal Medicine            CHIEF COMPLAINT:  sob    HISTORY OF PRESENT ILLNESS:      This is a patient came in with increased sob at assisted living. He was found to be hypoxic with sat 88 and chf with high pro bnp. He was given lasix and admitted to [address] [place] [address] . He has not seen his cardiologist for awhile. PAST MEDICAL Hx:  Past Medical History:   Diagnosis Date    Ambulatory dysfunction 4/30/2019    Hx severe lumbar psinal stenosis, severe at L4/5 level-MR lumbar spine, 2018    Blood transfusion     CAD (coronary artery disease)     [facility]     Diabetes mellitus ([facility] [address]     History of carotid endarterectomy 4/30/2019    Right CEA    History of left common carotid artery stent placement 4/30/2019    Hyperlipidemia     Hypertension        PAST SURGICAL Hx:   Past Surgical History:   Procedure Laterality Date    ABDOMINAL AORTIC ANEURYSM REPAIR, ENDOVASCULAR  1 year ago    2 stents    BACK SURGERY      CARDIAC SURGERY  2000    2 vesselCABG    CAROTID ENDARTERECTOMY  rt and left stent in rt    Bilateral    CATARACT REMOVAL WITH IMPLANT Right 04/08/14    CATARACT REMOVAL WITH IMPLANT Left 9 23 14    COLONOSCOPY      ENDOSCOPY, COLON, DIAGNOSTIC      TONSILLECTOMY      UPPER GASTROINTESTINAL ENDOSCOPY         FAMILY Hx:  History reviewed. No pertinent family history. HOME MEDICATIONS:  Prior to Admission medications    Medication Sig Start Date End Date Taking?  Authorizing Provider   carbidopa-levodopa (SINEMET)  MG per tablet Take 1 tablet by mouth 3 times daily   Yes Historical Provider, MD   dapsone 25 MG tablet Take 50 mg by mouth daily (with breakfast)   Yes Historical Provider, MD   FLUoxetine (PROZAC) 10 MG tablet Take 10 mg by mouth nightly   Yes Historical Provider, MD   fluticasone (FLONASE) 50 MCG/ACT nasal spray 2 sprays by Each Nostril route daily   Yes Historical Provider, MD   aspirin 81 MG chewable tablet Take 81 mg by mouth daily Yes Historical Provider, MD   insulin lispro protamine & lispro (HUMALOG MIX) (75-25) 100 UNIT per ML SUSP injection vial Inject 25 Units into the skin every morning    Yes Historical Provider, MD   insulin lispro protamine & lispro (HUMALOG MIX) (75-25) 100 UNIT per ML SUSP injection vial Inject 15 Units into the skin nightly    Yes Historical Provider, MD   MAGNESIUM PO Take 400 mg by mouth daily    Yes Historical Provider, MD   isosorbide mononitrate (IMDUR) 30 MG extended release tablet Take 60 mg by mouth daily    Yes Historical Provider, MD   furosemide (LASIX) 20 MG tablet Take 20 mg by mouth daily    Yes Historical Provider, MD   rosuvastatin (CRESTOR) 5 MG tablet Take 5 mg by mouth every other day   Yes Historical Provider, MD   tamsulosin (FLOMAX) 0.4 MG capsule Take 0.8 mg by mouth nightly  4/11/19  Yes Historical Provider, MD   metoprolol tartrate (LOPRESSOR) 50 MG tablet Take 50 mg by mouth 2 times daily   Yes Historical Provider, MD   losartan (COZAAR) 100 MG tablet Take 100 mg by mouth daily. Yes Historical Provider, MD   amlodipine (NORVASC) 5 MG tablet Take 5 mg by mouth daily    Yes Historical Provider, MD   nitroGLYCERIN (NITROSTAT) 0.4 MG SL tablet Place 0.4 mg under the tongue every 5 minutes as needed. Yes Historical Provider, MD   clopidogrel (PLAVIX) 75 MG tablet Take 75 mg by mouth daily. Yes Historical Provider, MD   cloNIDine (CATAPRES) 0.3 MG tablet Take 0.3 mg by mouth 2 times daily    Historical Provider, MD   dapsone 25 MG tablet Take 25 mg by mouth nightly     Historical Provider, MD       ALLERGIES:  Patient has no known allergies.     SOCIAL Hx:  Social History     Socioeconomic History    Marital status:      Spouse name: Not on file    Number of children: Not on file    Years of education: Not on file    Highest education level: Not on file   Occupational History    Not on file   Tobacco Use    Smoking status: Former Smoker    Smokeless tobacco: Never Used  Tobacco comment: quit smoking 15 yrs ago   Vaping Use    Vaping Use: Never used   Substance and Sexual Activity    Alcohol use: No    Drug use: No    Sexual activity: Not Currently   Other Topics Concern    Not on file   Social History Narrative    Not on file     Social Determinants of Health     Financial Resource Strain:     Difficulty of Paying Living Expenses:    Food Insecurity:     Worried About Running Out of Food in the Last Year:     [address] in the Last Year:    Transportation Needs:     Lack of Transportation (Medical):  Lack of Transportation (Non-Medical):    Physical Activity:     Days of Exercise per Week:     Minutes of Exercise per Session:    Stress:     Feeling of Stress :    Social Connections:     Frequency of Communication with Friends and Family:     Frequency of Social Gatherings with Friends and Family:     Attends Spiritism Services:     Active Member of Clubs or Organizations:     Attends Club or Organization Meetings:     Marital Status:    Intimate Partner Violence:     Fear of Current or Ex-Partner:     Emotionally Abused:     Physically Abused:     Sexually Abused:        ROS:  General:   Denies chills, fatigue, fever, malaise, night sweats or weight loss    Psychological:   Denies anxiety, disorientation or hallucinations    ENT:    Denies epistaxis, headaches, vertigo or visual changes    Cardiovascular:   Denies any chest pain, irregular heartbeats, or palpitations. No paroxysmal nocturnal dyspnea. Respiratory:   Sob on exertion and leg swelling    Gastrointestinal:   Denies nausea, vomiting, diarrhea, or constipation. Denies any abdominal pain. Denies change in bowel habits or stools. Genito-Urinary:    Denies any urgency, frequency, hematuria. Voiding without difficulty. Musculoskeletal:   Denies joint pain, joint stiffness, joint swelling or muscle pain    Neurology:    Denies any headache or focal neurological deficits.  No weakness or paresthesia. Derm:    Denies any rashes, ulcers, or excoriations. Denies bruising. Extremities:   Denies any lower extremity swelling or edema. PHYSICAL EXAM:  VITALS:  Vitals:    06/12/21 0930   BP: (!) 150/62   Pulse: 63   Resp: 15   Temp: 98.2 °F (36.8 °C)   SpO2: 96%         CONSTITUTIONAL:    Awake, alert, cooperative, no apparent distress, and appears stated age    EYES:    PERRL, EOMI, sclera clear, conjunctiva normal    ENT:    Normocephalic, atraumatic, sinuses nontender on palpation. External ears without lesions. Oral pharynx with moist mucus membranes. Tonsils without erythema or exudates. NECK:    Supple, symmetrical, trachea midline, no adenopathy, thyroid symmetric, not enlarged and no tenderness, skin normal, no bruits, no JVD    HEMATOLOGIC/LYMPHATICS:    No cervical lymphadenopathy and no supraclavicular lymphadenopathy    LUNGS:    Decrease bs    CARDIOVASCULAR:    Normal apical impulse, regular rate and rhythm, normal S1 and S2, no S3 or S4, and no murmur noted    ABDOMEN:    No scars, normal bowel sounds, soft, non-distended, non-tender, no masses palpated, no hepatosplenomegaly, no rebound or guarding elicited on palpation     MUSCULOSKELETAL:    There is no redness, warmth, or swelling of the joints. Full range of motion noted. Motor strength is 5 out of 5 all extremities bilaterally. Tone is normal.    NEUROLOGIC:    Awake, alert, oriented to name, place and time. Cranial nerves II-XII are grossly intact. Motor is 5 out of 5 bilaterally. SKIN:    No bruising or bleeding. No redness, warmth, or swelling    EXTREMITIES:    Peripheral pulses present. No edema, cyanosis, or swelling.       LABORATORY DATA:  CBC:   Lab Results   Component Value Date    WBC 6.5 06/12/2021    RBC 3.15 06/12/2021    HGB 10.4 06/12/2021    HCT 33.3 06/12/2021    .7 06/12/2021    MCH 33.0 06/12/2021    MCHC 31.2 06/12/2021    RDW 15.9 06/12/2021     06/12/2021    MPV 10.6 06/12/2021     CMP:    Lab Results   Component Value Date     06/12/2021    K 4.3 06/12/2021    K 4.7 06/10/2021     06/12/2021    CO2 29 06/12/2021    BUN 26 06/12/2021    CREATININE 1.3 06/12/2021    GFRAA >60 06/12/2021    LABGLOM 52 06/12/2021    GLUCOSE 81 06/12/2021    GLUCOSE 113 12/08/2011    PROT 6.5 02/05/2021    LABALBU 3.8 02/05/2021    LABALBU 4.2 12/08/2011    CALCIUM 8.7 06/12/2021    BILITOT 0.6 02/05/2021    ALKPHOS 88 02/05/2021    AST 29 02/05/2021    ALT 13 02/05/2021       ASSESSMENT:  Patient Active Problem List   Diagnosis    Right cataract    Left cataract    Lumbar stenosis    DDD (degenerative disc disease), lumbar    History of carotid endarterectomy    History of left common carotid artery stent placement    Ambulatory dysfunction    Acute respiratory failure with hypoxia (HCC)    Acute CHF (congestive heart failure) (HCC)    Congestive heart failure due to cardiomyopathy (HCC)    Acute diastolic heart failure (HCC)    Type 2 diabetes mellitus with circulatory disorder, with long-term current use of insulin (HCC)    Stage 2 chronic kidney disease       PLAN:  Iv lasix. Cardiologist eval.  Oxygen. Pt/ot. Monitor kidneys functions. 2 d echo.     Amber Barrett MD, M.D.  11:06 AM  6/12/2021

## 2021-06-13 NOTE — PROGRESS NOTES
Progress Note  Date:2021       Room:0634/0634-01  Patient Michael Wu     YOB: 1931     Age:89 y.o. Subjective    Subjective:  Symptoms:  Stable. He reports malaise, weakness, anorexia and anxiety. No shortness of breath, cough, chest pain, headache, chest pressure or diarrhea. Diet:  Adequate intake. Activity level: Impaired due to weakness. Pain:  He reports no pain. pt is doing better. Sleeping. Breathing better  Review of Systems   Respiratory: Negative for cough and shortness of breath. Cardiovascular: Negative for chest pain. Gastrointestinal: Positive for anorexia. Negative for diarrhea. Neurological: Positive for weakness. Objective         Vitals Last 24 Hours:  TEMPERATURE:  Temp  Av.1 °F (36.7 °C)  Min: 97.9 °F (36.6 °C)  Max: 98.2 °F (36.8 °C)  RESPIRATIONS RANGE: Resp  Avg: 15.7  Min: 15  Max: 17  PULSE OXIMETRY RANGE: SpO2  Av %  Min: 96 %  Max: 96 %  PULSE RANGE: Pulse  Av.5  Min: 66  Max: 79  BLOOD PRESSURE RANGE: Systolic (63NYY), VWP:468 , Min:141 , KGY:621   ; Diastolic (80UOC), YAB:92, Min:56, Max:77    I/O (24Hr): Intake/Output Summary (Last 24 hours) at 2021 1023  Last data filed at 2021 1013  Gross per 24 hour   Intake 581 ml   Output 2850 ml   Net -2269 ml     Objective:  General Appearance:  Comfortable. Vital signs: (most recent): Blood pressure (!) 192/77, pulse 79, temperature 97.9 °F (36.6 °C), temperature source Oral, resp. rate 15, height 5' 7\" (1.702 m), weight 200 lb (90.7 kg), SpO2 96 %. Vital signs are normal.    Output: Producing urine. HEENT: Normal HEENT exam.    Lungs:  Normal respiratory rate. He is in respiratory distress. No decreased breath sounds. Heart: Irregular rhythm. S1 normal and S2 normal.    Abdomen: Abdomen is soft. There is no abdominal tenderness. Extremities: Normal range of motion. Neurological: Patient is alert and oriented to person, place and time. Pupils:  Pupils are equal, round, and reactive to light. Skin:  Warm and dry. Labs/Imaging/Diagnostics    Labs:  CBC:  Recent Labs     06/11/21  1503 06/12/21  0754 06/13/21  0524   WBC 6.0 6.5 6.1   RBC 2.82* 3.15* 3.04*   HGB 9.4* 10.4* 10.2*   HCT 29.8* 33.3* 32.2*   .7* 105.7* 105.9*   RDW 16.2* 15.9* 15.9*    156 156     CHEMISTRIES:  Recent Labs     06/11/21  1503 06/12/21  0754 06/13/21  0523    140 140   K 4.7 4.3 4.3    102 101   CO2 28 29 30*   BUN 27* 26* 29*   CREATININE 1.2 1.3* 1.2   GLUCOSE 213* 81 61*     PT/INR:No results for input(s): PROTIME, INR in the last 72 hours. APTT:No results for input(s): APTT in the last 72 hours. LIVER PROFILE:No results for input(s): AST, ALT, BILIDIR, BILITOT, ALKPHOS in the last 72 hours. Imaging Last 24 Hours:  Echo Complete    Result Date: 6/11/2021  Transthoracic Echocardiography Report (TTE)  Demographics   Patient Name    Royce Avelar Gender            Male                  R   Medical Record  [id number]      Room Number       5272  Number   Account #       [de-identified]     Procedure Date    06/11/2021   Corporate ID                  Ordering          Renee JEFFERSON                                Physician   Accession       [id number]    Referring  Number                        Physician   Date of Birth   07/04/1931    Sonographer       Baltazar Melgoza RDCS   Age             80 year(s)    Interpreting      Jaida 64                                Physician         Physician Cardiology                                                  Norberto Munguia MD                                 Any Other  Procedure Type of Study   TTE procedure:Echo Complete W/Doppler & Color Flow. Procedure Date Date: 06/11/2021 Start: 11:14 AM Study Location: Echo Lab Technical Quality: Poor visualization due to poor acoustical window. Indications:Congestive heart failure. Patient Status: Routine Contrast Medium: Definity.  Height: 67 inches Weight: 200 pounds BSA: 2.02 m^2 BMI: 31.32 kg/m^2  Findings   Left Ventricle  Normal left ventricular chamber size. Normal left ventricular systolic function, LVEF is 65%. Mild left ventricular concentric hypertrophy noted. stage I diastolic dysfunction. Right Ventricle  Right ventricle is mildly dilated with normal function. Left Atrium  Left atrium is mildly enlarged. Increased left atrial volume. Interatrial septum not well visualized but appears intact. Right Atrium  Right atrium is mildly enlarged. Mitral Valve  Normal mitral valve structure. There is trace mitral regurgitation. No mitral valve prolapse. Tricuspid Valve  Normal tricuspid valve structure. There is trace tricuspid regurgitation, RVSP 26mmHg. Aortic Valve  The aortic valve appears mildly sclerotic with focal areas of  calcification. No hemodynamically significant aortic stenosis. There is trace aortic regurgitation. Pulmonic Valve  The pulmonic valve was not well visualized. Pericardial Effusion  No evidence of pericardial effusion. Pericardium appears normal.   Pleural Effusion  No evidence of pleural effusion. Aorta  Normal aortic root size and ascending aorta. Miscellaneous  The inferior vena cava diameter is normal with normal respiratory  variation. Conclusions   Summary  Normal left ventricular chamber size. Normal left ventricular systolic function, LVEF is 65%. Mild left ventricular concentric hypertrophy noted. stage I diastolic dysfunction. Left atrium is mildly enlarged. Increased left atrial volume. Interatrial septum not well visualized but appears intact. Right ventricle is mildly dilated with normal function. Right atrium is mildly enlarged. There is trace mitral regurgitation. No mitral valve prolapse. The aortic valve appears mildly sclerotic with focal areas of  calcification. No hemodynamically significant aortic stenosis. There is trace aortic regurgitation.   There is trace tricuspid Reversal         TR Gradient:16.63 mmHg  Time: 267.7 msec  Duration:156.9 msec           PV Peak Velocity: 1.02 m/s  MV P1/2t: 112.3   Pulm. Vein D Velocity:0.44    PV Peak Gradient: 4.13  msec              m/sPulm. Vein A Reversal      mmHg  MVA by PHT:1.96   Velocity:0.19 m/s             PV Mean Velocity: 0.72 m/s  cm^2              Pulm. Vein S Velocity: 0.33   PV Mean Gradient: 2.2 mmHg  MV Area           m/s  (continuity): 2  cm^2  [email or web address] DocKey=[id number]    Assessment//Plan           Hospital Problems         Last Modified POA    * (Principal) Acute CHF (congestive heart failure) ([address] 6/10/2021 Yes    Acute respiratory failure with hypoxia ([address] 6/10/2021 Yes    Congestive heart failure due to cardiomyopathy ([address] 6/10/2021 Yes    Acute diastolic heart failure ([address] 6/12/2021 Yes    Type 2 diabetes mellitus with circulatory disorder, with long-term current use of insulin ([address] 6/12/2021 Yes    Stage 2 chronic kidney disease 6/12/2021 Yes        Assessment:  (Problem List as of 6/13/2021 Reviewed: 7/1/2015 11:06 AM by Sheba Last DO    History of carotid endarterectomy    History of left common carotid artery stent placement    Ambulatory dysfunction    Right cataract    Left cataract    Lumbar stenosis    DDD (degenerative disc disease), lumbar    Acute respiratory failure with hypoxia (HCC)    * (Principal) Acute CHF (congestive heart failure) (HCC)    Congestive heart failure due to cardiomyopathy ([address]    Acute diastolic heart failure ([address]    Type 2 diabetes mellitus with circulatory disorder, with long-term   current use of insulin (HCC)    Stage 2 chronic kidney disease    ). Plan:   (Cont with lasix).        Electronically signed by Jf Araujo MD on 6/13/21 at 10:23 AM BRANDY

## 2021-06-13 NOTE — PLAN OF CARE
Problem: Falls - Risk of:  Goal: Will remain free from falls  Description: Will remain free from falls  6/13/2021 0924 by Sindy Paget, RN  Outcome: Met This Shift     Problem: Falls - Risk of:  Goal: Absence of physical injury  Description: Absence of physical injury  6/13/2021 0924 by Sindy Paget, RN  Outcome: Met This Shift     Problem: Pain:  Goal: Pain level will decrease  Description: Pain level will decrease  6/13/2021 0924 by Sindy Paget, RN  Outcome: Met This Shift     Problem: Pain:  Goal: Control of acute pain  Description: Control of acute pain  6/13/2021 0924 by Sindy Paget, RN  Outcome: Met This Shift     Problem: Pain:  Goal: Control of chronic pain  Description: Control of chronic pain  6/13/2021 0924 by Sindy Paget, RN  Outcome: Met This Shift     Problem: Skin Integrity:  Goal: Will show no infection signs and symptoms  Description: Will show no infection signs and symptoms  6/13/2021 0924 by Sindy Paget, RN  Outcome: Met This Shift     Problem: Skin Integrity:  Goal: Absence of new skin breakdown  Description: Absence of new skin breakdown  6/13/2021 0924 by Sindy Paget, RN  Outcome: Met This Shift     Problem: Skin Integrity:  Goal: Risk for impaired skin integrity will decrease  Description: Risk for impaired skin integrity will decrease  6/13/2021 0924 by Sindy Paget, RN  Outcome: Met This Shift     Problem: Cardiac:  Goal: Hemodynamic stability will improve  Description: Hemodynamic stability will improve  6/13/2021 0924 by Sindy Paget, RN  Outcome: Met This Shift     Problem: Nutritional:  Goal: Consumption of the prescribed amount of daily calories will improve  Description: Consumption of the prescribed amount of daily calories will improve  6/13/2021 0924 by Sindy Paget, RN  Outcome: Met This Shift     Problem: Respiratory:  Goal: Ability to maintain a clear airway will improve  Description: Ability to maintain a clear airway will improve  6/13/2021 3693 by Tori Bro RN  Outcome: Met This Shift     Problem: Respiratory:  Goal: Ability to maintain adequate ventilation will improve  Description: Ability to maintain adequate ventilation will improve  6/13/2021 0924 by Tori Bro RN  Outcome: Met This Shift     Problem: Respiratory:  Goal: Complications related to the disease process, condition or treatment will be avoided or minimized  Description: Complications related to the disease process, condition or treatment will be avoided or minimized  6/13/2021 0924 by Tori Bro RN  Outcome: Met This Shift

## 2021-06-13 NOTE — PLAN OF CARE
Problem: Falls - Risk of:  Goal: Will remain free from falls  Description: Will remain free from falls  Outcome: Ongoing  Goal: Absence of physical injury  Description: Absence of physical injury  Outcome: Ongoing     Problem: Pain:  Goal: Pain level will decrease  Description: Pain level will decrease  Outcome: Ongoing  Goal: Control of acute pain  Description: Control of acute pain  Outcome: Ongoing  Goal: Control of chronic pain  Description: Control of chronic pain  Outcome: Ongoing     Problem: Skin Integrity:  Goal: Will show no infection signs and symptoms  Description: Will show no infection signs and symptoms  6/13/2021 0042 by Chantale Haque RN  Outcome: Ongoing  6/12/2021 1140 by Kendall Zhao RN  Outcome: Met This Shift  Goal: Absence of new skin breakdown  Description: Absence of new skin breakdown  6/13/2021 0042 by Chantale Haque RN  Outcome: Ongoing  6/12/2021 1140 by Kendall Zhao RN  Outcome: Met This Shift  Goal: Risk for impaired skin integrity will decrease  Description: Risk for impaired skin integrity will decrease  6/13/2021 0042 by Chantale Haque RN  Outcome: Ongoing  6/12/2021 1140 by Kendall Zhao RN  Outcome: Met This Shift     Problem: Cardiac:  Goal: Hemodynamic stability will improve  Description: Hemodynamic stability will improve  6/13/2021 0042 by Chantale Haque RN  Outcome: Ongoing  6/12/2021 1140 by Kendall Zhao RN  Outcome: Met This Shift     Problem: Coping:  Goal: Level of anxiety will decrease  Description: Level of anxiety will decrease  6/13/2021 0042 by Chantale Haque RN  Outcome: Ongoing  6/12/2021 1140 by Kendall Zhao RN  Outcome: Met This Shift  Goal: Ability to cope will improve  Description: Ability to cope will improve  Outcome: Ongoing  Goal: Ability to establish a method of communication will improve  Description: Ability to establish a method of communication will improve  Outcome: Ongoing     Problem: Nutritional:  Goal: Consumption of the prescribed amount of daily calories will improve  Description: Consumption of the prescribed amount of daily calories will improve  6/13/2021 0042 by Judieth Cogan, RN  Outcome: Ongoing  6/12/2021 1140 by Aileen Chavez RN  Outcome: Met This Shift     Problem: Respiratory:  Goal: Ability to maintain a clear airway will improve  Description: Ability to maintain a clear airway will improve  6/13/2021 0042 by Judieth Cogan, RN  Outcome: Ongoing  6/12/2021 1140 by Aileen Chavez RN  Outcome: Met This Shift  Goal: Ability to maintain adequate ventilation will improve  Description: Ability to maintain adequate ventilation will improve  6/13/2021 0042 by Judieth Cogan, RN  Outcome: Ongoing  6/12/2021 1140 by Aileen Chavez RN  Outcome: Met This Shift  Goal: Complications related to the disease process, condition or treatment will be avoided or minimized  Description: Complications related to the disease process, condition or treatment will be avoided or minimized  6/13/2021 0042 by Judieth Cogan, RN  Outcome: Ongoing  6/12/2021 1140 by Aileen Chavez RN  Outcome: Met This Shift     Problem: Falls - Risk of:  Goal: Will remain free from falls  Description: Will remain free from falls  Outcome: Ongoing

## 2021-06-14 NOTE — CARE COORDINATION
CM Note: 2021 at 10:41am: COVID TEST NEGATIVE - test done on 6/10. Noted that patient is being discharged today. Evaluated by PT this am who is recommending PRO. Patient is currently on 3L NC and per his nurse Katey Filter, he did qualify for home O2. Patient with hx of Parkinson's and is tremoring at times while talking to CM. CM talked to the patient and reviewed PT and OT recommendations. Reviewed the SNF choice list with ratings that was in his room and he states he is agreeable to go to a facility that is close to where is wife is. Both he and his wife are at [address] and it is on the campus of [facility] in [place]. He states he is agreeable for a short time. Called and spoke to his step daughter Raisa Latham very briefly as she was going into a  as she is the  . She gave me permission to speak with her friend Brad Carballo - states to check into [facility] and ask that I call her back after the . Dr. Mila Lenz called and informed that patient did qualify for oxygen and he is willing to go to a facility. Talked to [employer] of [facility] and she will review PT notes - await to hear from them to see if they can accept. Will call daughter Raisa Back back after . FOR SNF - NO PRECERT NEEDED. WILL NEED A SIGNED AND COMPLETED KOBE AND HENS. Myrna Hernandez RN    SS NOTE: HENs completed today. KELLY Palma.2021.1:13 PM.

## 2021-06-14 NOTE — PROGRESS NOTES
Pulse ox was 87 % on room air at rest.   Applied 3 L of oxygen. Oxygen saturation was 95 % on 3L while ambulating.

## 2021-06-14 NOTE — CARE COORDINATION
CM Note: 6/14/2021: COVID TEST NEGATIVE - test done on 6/10. Called and talked to step-daughter Dave Aggarwal who is in agreement with her dad going to [address] [place] [address]. Per Aleksandra for [facility], she states that they reviewed his PT notes and with his new oxygen, they state that they agree that Mr. Bernard Macario needs rehab to get stronger before he can return to their assisted living facility. She states they have a bed today. Nurse to Nurse: [phone]. Many made arrangements for Mr. Bernard Macario to be picked up at 4pm via [employer]. Informed her that he has he own rollator here and she states that they would probably be able to take it with them in the ambulance - but if they are unable to take it, she asks that we call them and they will arrange for one of their drivers to pick it up so the family would not have to return to the hospital. Nursing informed. Nurse Nikhil called and informed Dr. Annabel Hernandez of discharge. Called patient's step daughter Dave Aggarwal and informed her of time of  and she is in agreement. Patient informed and is in agreement. PRECERT NOT NEEDED. [address] NEEDS COMPLETED AND SIGNED.  HENS DONE BY Camryn Ramirez RN

## 2021-06-14 NOTE — DISCHARGE INSTR - COC
Continuity of Care Form    Patient Name: Alisha Michaels   :  1931  MRN:  [id number]    [facility] date:  6/10/2021  Discharge date:  21    Code Status Order: DNR-CCA   Advance Directives:   Advance Care Flowsheet Documentation       Date/Time Healthcare Directive Type of Healthcare Directive Copy in [address] Agent's Name Healthcare Agent's Phone Number    06/10/21 8956  Yes, patient has an advance directive for healthcare treatment  Health care treatment directive  Yes, copy in chart  Healthcare power of   --  --            Admitting Physician:   Ida Barrett MD  PCP: Ida Barrett MD    Discharging Nurse: BAILEE Padilla [place] Unit/Room#: 4082/5188-27  Discharging Unit Phone Number: [phone]    Emergency Contact:   Extended Emergency Contact Information  Primary Emergency Contact: Cheryl Arriola  Address: [address] [place] [address] Phone: [phone]  Relation: Spouse  Secondary Emergency Contact: Pacheco Joseph [address] Phone: [phone]  Relation: Child    Past Surgical History:  Past Surgical History:   Procedure Laterality Date    ABDOMINAL AORTIC ANEURYSM REPAIR, ENDOVASCULAR  1 year ago    2 stents    [facility]    2 vesselCABG    CAROTID ENDARTERECTOMY  rt and left stent in rt    Bilateral    CATARACT REMOVAL WITH IMPLANT Right 14    CATARACT REMOVAL WITH IMPLANT Left 14    COLONOSCOPY      ENDOSCOPY, COLON, DIAGNOSTIC      TONSILLECTOMY      UPPER GASTROINTESTINAL ENDOSCOPY         Immunization History:   Immunization History   Administered Date(s) Administered    COVID-19, Pfizer, PF, 30mcg/0.3mL 2021, 2021       Active Problems:  Patient Active Problem List   Diagnosis Code    Right cataract H26.9    Left cataract H26.9    Lumbar stenosis M48.061    DDD (degenerative disc disease), lumbar M51.36    History of carotid endarterectomy Z98.890    History of left common carotid artery stent placement Z98.890, Z95.828    Ambulatory dysfunction R26.2    Acute respiratory failure with hypoxia (HCC) J96.01    Acute CHF (congestive heart failure) (HCC) I50.9    Congestive heart failure due to cardiomyopathy (HCC) I50.9, C45.8    Acute diastolic heart failure (HCC) I50.31    Type 2 diabetes mellitus with circulatory disorder, with long-term current use of insulin (HCC) E11.59, Z79.4    Stage 2 chronic kidney disease N18.2       Isolation/Infection:   Isolation            No Isolation          Patient Infection Status       Infection Onset Added Last Indicated Last Indicated By Review Planned Expiration Resolved Resolved By    None active    Resolved    COVID-19 Rule Out 06/10/21 06/10/21 06/10/21 COVID-19, Rapid (Ordered)   06/10/21 Rule-Out Test Resulted            Nurse Assessment:  Last Vital Signs: BP (!) 154/67   Pulse 97   Temp 98.6 °F (37 °C) (Oral)   Resp 14   Ht 5' 7\" (1.702 m)   Wt 200 lb (90.7 kg)   SpO2 94%   BMI 31.32 kg/m²     Last documented pain score (0-10 scale): Pain Level: 0  Last Weight:   Wt Readings from Last 1 Encounters:   06/10/21 200 lb (90.7 kg)     Mental Status:  oriented and alert    IV Access:  - None    Nursing Mobility/ADLs:  Walking   Assisted  Transfer  Assisted  Bathing  Assisted  Dressing  Assisted  Toileting  Assisted  Feeding  Independent  Med Admin  Independent  Med Delivery   whole    Wound Care Documentation and Therapy:  Incision 01/09/13 Groin Right (Active)   Number of days: 3078        Elimination:  Continence: Bowel: No  Bladder: No  Urinary Catheter: None   Colostomy/Ileostomy/Ileal Conduit: No       Date of Last BM: ***    Intake/Output Summary (Last 24 hours) at 6/14/2021 1246  Last data filed at 6/13/2021 2015  Gross per 24 hour   Intake 900 ml   Output 800 ml   Net 100 ml     I/O last 3 completed shifts:   In: 1140 [P.O.:1140]  Out: 2500 [Urine:2500]    Safety Concerns: At Risk for Falls    Impairments/Disabilities:      Hearing    Nutrition Therapy:  Current Nutrition Therapy:   - Oral Diet:  General    Routes of Feeding: Oral  Liquids: No Restrictions  Daily Fluid Restriction: no  Last Modified Barium Swallow with Video (Video Swallowing Test): not done    Treatments at the Time of Hospital Discharge:   Respiratory Treatments: ***  Oxygen Therapy:  is on oxygen at 3 L/min per nasal cannula. Ventilator:    - No ventilator support    Rehab Therapies: Physical Therapy and Occupational Therapy  Weight Bearing Status/Restrictions: No weight bearing restirctions  Other Medical Equipment (for information only, NOT a DME order):  walker  Other Treatments: ***    Patient's personal belongings (please select all that are sent with patient):  Hearing Aides bilateral    RN SIGNATURE:  Electronically signed by Cici Cleary RN on 6/14/21 at 12:52 PM EDT    CASE MANAGEMENT/SOCIAL WORK SECTION    Inpatient Status Date: ***    Readmission Risk Assessment Score:  Readmission Risk              Risk of Unplanned Readmission:  16           Discharging to Facility/ Agency   Name: [address] [place] [address]  Address: [address] [place] [address]. [facility]  UZFFE:[phone]  YS:[id number]    Dialysis Facility (if applicable)   Name:  Address:  Dialysis Schedule:  Phone:  Fax:    / signature: Electronically signed by Elidia Barnhart RN on 6/14/21 at 1:23 PM EDT    PHYSICIAN SECTION    Prognosis: Good    Condition at Discharge: Stable    Rehab Potential (if transferring to Rehab): Good    Recommended Labs or Other Treatments After Discharge: ***    Physician Certification: I certify the above information and transfer of Bernardino Braun  is necessary for the continuing treatment of the diagnosis listed and that he requires [facility] for less 30 days.      Update Admission H&P: {P DME Changes in OENZQ:[id number]}    PHYSICIAN SIGNATURE: Electronically signed by Amber Barrett MD on 6/14/21 at 2:28 PM EDT

## 2021-06-14 NOTE — PROGRESS NOTES
Physical Therapy Treatment Note/Plan of Care    Room #:  0316/0680-95  Patient Name: Shubham Dave  YOB: 1931  MRN: [id number]    Date of Service: 6/14/2021      Tentative placement recommendation: Subacute rehab or [place]     Equipment recommendation: To be determined      Evaluating Physical Therapist: Vero Oviedo, PT #[id number]      Specific Provider Orders/Date/Referring Provider :  06/10/21 2015   PT eval and treat Start: 06/10/21 2015, End: 06/10/21 2015, ONE TIME, Standing Count: 1 Occurrences, Liz Watt MD      Admitting Diagnosis:   Acute respiratory failure with hypoxia ([address] [J96.01]  Congestive heart failure due to cardiomyopathy ([address] [I50.9, I42.9]  worsening shortness of breath      Visit Diagnoses       Codes    Acute on chronic congestive heart failure, unspecified heart failure type ([address]    -  Primary I50.9        Surgery:  -    Patient Active Problem List   Diagnosis    Right cataract    Left cataract    Lumbar stenosis    DDD (degenerative disc disease), lumbar    History of carotid endarterectomy    History of left common carotid artery stent placement    Ambulatory dysfunction    Acute respiratory failure with hypoxia (HCC)    Acute CHF (congestive heart failure) (HCC)    Congestive heart failure due to cardiomyopathy ([address]    Acute diastolic heart failure (HCC)    Type 2 diabetes mellitus with circulatory disorder, with long-term current use of insulin (HCC)    Stage 2 chronic kidney disease        ASSESSMENT of Current Deficits Patient exhibits decreased strength, balance and endurance impairing functional mobility, transfers, gait , gait distance and tolerance to activity hard of hearing, cueing throughout treatment for safety awareness. Patient increase gait distance this treatment however needs assist from therapist. Increased assist with lower transfers.  Nursing present, tested to see if patient would pass with no spo2, however of carotid endarterectomy 4/30/2019    Right CEA    History of left common carotid artery stent placement 4/30/2019    Hyperlipidemia     Hypertension      Past Surgical History:   Procedure Laterality Date    ABDOMINAL AORTIC ANEURYSM REPAIR, ENDOVASCULAR  1 year ago    2 stents    BACK SURGERY      CARDIAC SURGERY  2000    2 vesselCABG    CAROTID ENDARTERECTOMY  rt and left stent in rt    Bilateral    CATARACT REMOVAL WITH IMPLANT Right 04/08/14    CATARACT REMOVAL WITH IMPLANT Left 9 23 14    COLONOSCOPY      ENDOSCOPY, COLON, DIAGNOSTIC      TONSILLECTOMY      UPPER GASTROINTESTINAL ENDOSCOPY         SUBJECTIVE:    Precautions:  , falls and alarm , can have inappropriate behavior  Social history: Patient lives with spouse   in a assisted living    Walk in shower grab bars on left     Equipment owned: Rollator and Shower chair,       [facility] Mobility Inpatient   How much difficulty turning over in bed?: A Little  How much difficulty sitting down on / standing up from a chair with arms?: A Little  How much difficulty moving from lying on back to sitting on side of bed?: A Lot  How much help from another person moving to and from a bed to a chair?: A Lot  How much help from another person needed to walk in hospital room?: A Lot  How much help from another person for climbing 3-5 steps with a railing?: Total  AM-PAC Inpatient Mobility Raw Score : 13  AM-PAC Inpatient T-Scale Score : 36.74  Mobility Inpatient CMS 0-100% Score: 64.91  Mobility Inpatient CMS G-Code Modifier : CL    Nursing cleared patient for PT treatment. patient wanted to ambulate with rollator due to ambulating with rollator at home. OBJECTIVE;   Initial Evaluation  Date: 6/11/2021 Treatment Date:    6/14/2021   Short Term/ Long Term   Goals   Was pt agreeable to Eval/treatment? Yes   Yes  To be met in 3 days   Pain level   0/10    7-8/10 right hip    Bed Mobility    Rolling:  Moderate assist of 1 risks of immobility, safety and plan of care,  importance of mobility while in hospital , purse lip breathing, ankle pumps, quad set and glut set for edema control, blood clot prevention, safety  and O2 line management and safety       Patient response to education:   Pt verbalized understanding Pt demonstrated skill Pt requires further education in this area   Yes Partial Yes      Treatment:  Patient practiced and was instructed/facilitated in the following treatment: Patient assisted to edge of bed. Sat edge of bed 15 minutes with Minimal assist of 1 to increase dynamic sitting balance and activity tolerance. performed seated exercises with minimal assist, patient also holds onto bed rails at times. Therapist donned shoes. Stood and ambulated in room, standing rest break and ambulated in room again. returned to chair. Therapeutic Exercises:  ankle pumps, heel raises, long arc quad and seated marching  x 12 reps. At end of session, patient in chair with pillow underneath patient with alarm call light and phone within reach,   all lines and tubes intact, nursing notified. Nursing present during most of treatment session. Patient would benefit from continued skilled Physical Therapy to improve functional independence and quality of life. Patient's/ family goals   Return to [address]        Time in  925  Time out  1001    Total Treatment Time 36  minutes        CPT codes:    Therapeutic activities (91936)   15 minutes  1 unit(s)  Therapeutic exercises (69650)   15 minutes  1 unit(s)  Non billable time 6 minutes due to nursing talking with patient about home vs snf.     Jcarlos Garcia, [facility] [place] #[phone]

## 2021-06-14 NOTE — PLAN OF CARE
Problem: Falls - Risk of:  Goal: Will remain free from falls  Description: Will remain free from falls  6/13/2021 2240 by Zaynab Dumont RN  Outcome: Met This Shift  6/13/2021 0924 by Damian Epperson RN  Outcome: Met This Shift  Goal: Absence of physical injury  Description: Absence of physical injury  6/13/2021 2240 by Zaynab Dumont RN  Outcome: Met This Shift  6/13/2021 0924 by Damian Epperson RN  Outcome: Met This Shift     Problem: Pain:  Goal: Pain level will decrease  Description: Pain level will decrease  6/13/2021 2240 by Zaynab Dumont RN  Outcome: Met This Shift  6/13/2021 0924 by Damian Epperson RN  Outcome: Met This Shift  Goal: Control of acute pain  Description: Control of acute pain  6/13/2021 2240 by Zaynab Dumont RN  Outcome: Met This Shift  6/13/2021 0924 by Damian Epperson RN  Outcome: Met This Shift  Goal: Control of chronic pain  Description: Control of chronic pain  6/13/2021 2240 by Zaynab Dumont RN  Outcome: Met This Shift  6/13/2021 0924 by Damian Epperson RN  Outcome: Met This Shift     Problem: Skin Integrity:  Goal: Will show no infection signs and symptoms  Description: Will show no infection signs and symptoms  6/13/2021 2240 by Zaynab Dumont RN  Outcome: Met This Shift  6/13/2021 0924 by Damian Epperson RN  Outcome: Met This Shift  Goal: Absence of new skin breakdown  Description: Absence of new skin breakdown  6/13/2021 2240 by Zaynab Dumont RN  Outcome: Met This Shift  6/13/2021 0924 by Damian Epperson RN  Outcome: Met This Shift  Goal: Risk for impaired skin integrity will decrease  Description: Risk for impaired skin integrity will decrease  6/13/2021 2240 by Zaynab Dumont RN  Outcome: Met This Shift  6/13/2021 0924 by Damian Epperson RN  Outcome: Met This Shift     Problem: Cardiac:  Goal: Hemodynamic stability will improve  Description: Hemodynamic stability will improve  6/13/2021 2240 by Zaynab Dumont RN  Outcome: Met This Shift  6/13/2021 0924 by Damian Epperson RN  Outcome: Met This Shift     Problem: Coping:  Goal: Level of anxiety will decrease  Description: Level of anxiety will decrease  Outcome: Met This Shift  Goal: Ability to cope will improve  Description: Ability to cope will improve  Outcome: Met This Shift  Goal: Ability to establish a method of communication will improve  Description: Ability to establish a method of communication will improve  Outcome: Met This Shift     Problem: Nutritional:  Goal: Consumption of the prescribed amount of daily calories will improve  Description: Consumption of the prescribed amount of daily calories will improve  6/13/2021 2240 by Guillermo Dixon RN  Outcome: Met This Shift  6/13/2021 0924 by Kathy Perez RN  Outcome: Met This Shift     Problem: Respiratory:  Goal: Ability to maintain a clear airway will improve  Description: Ability to maintain a clear airway will improve  6/13/2021 2240 by Guillermo Dixon RN  Outcome: Met This Shift  6/13/2021 0924 by Kathy Perez RN  Outcome: Met This Shift  Goal: Ability to maintain adequate ventilation will improve  Description: Ability to maintain adequate ventilation will improve  6/13/2021 2240 by Guillermo Dixon RN  Outcome: Met This Shift  6/13/2021 0924 by Kathy Perez RN  Outcome: Met This Shift  Goal: Complications related to the disease process, condition or treatment will be avoided or minimized  Description: Complications related to the disease process, condition or treatment will be avoided or minimized  6/13/2021 2240 by Guillermo Dixon RN  Outcome: Met This Shift  6/13/2021 0924 by Kathy Perez RN  Outcome: Met This Shift

## 2021-06-14 NOTE — PROGRESS NOTES
Progress Note  Date:2021       Room:0634/0634-01  Patient Taylor Sullivan     YOB: 1931     Age:89 y.o. Subjective    Subjective:  Symptoms:  Stable. He reports malaise, weakness and anxiety. No shortness of breath, cough, chest pain, headache, chest pressure, anorexia or diarrhea. Diet:  Adequate intake. Activity level: Normal.       Review of Systems   Respiratory: Negative for cough and shortness of breath. Cardiovascular: Negative for chest pain. Gastrointestinal: Negative for anorexia and diarrhea. Neurological: Positive for weakness. Objective         Vitals Last 24 Hours:  TEMPERATURE:  Temp  Av.1 °F (36.7 °C)  Min: 97.8 °F (36.6 °C)  Max: 98.3 °F (36.8 °C)  RESPIRATIONS RANGE: Resp  Av.8  Min: 15  Max: 19  PULSE OXIMETRY RANGE: SpO2  Av.3 %  Min: 94 %  Max: 96 %  PULSE RANGE: Pulse  Av.3  Min: 64  Max: 80  BLOOD PRESSURE RANGE: Systolic (45XDS), MLM:940 , Min:125 , SFZ:900   ; Diastolic (21JOT), VALERIE:70, Min:56, Max:77    I/O (24Hr): Intake/Output Summary (Last 24 hours) at 2021  Last data filed at 2021  Gross per 24 hour   Intake 1140 ml   Output 3675 ml   Net -2535 ml     Objective:  General Appearance:  Comfortable. Vital signs: (most recent): Blood pressure (!) 125/58, pulse 75, temperature 97.8 °F (36.6 °C), temperature source Oral, resp. rate 19, height 5' 7\" (1.702 m), weight 200 lb (90.7 kg), SpO2 95 %. Vital signs are normal.    Output: Producing urine. HEENT: Normal HEENT exam.    Lungs:  He is not in respiratory distress. Heart: Regular rhythm. S1 normal and S2 normal.    Abdomen: Abdomen is soft. Bowel sounds are normal.     Extremities: Normal range of motion. Pulses: Distal pulses are intact.       Labs/Imaging/Diagnostics    Labs:  CBC:  Recent Labs     21  1503 21  0754 21  0524   WBC 6.0 6.5 6.1   RBC 2.82* 3.15* 3.04*   HGB 9.4* 10.4* 10.2*   HCT 29.8* 33.3* 32.2*   MCV 105.7* 105.7* 105.9*   RDW 16.2* 15.9* 15.9*    156 156     CHEMISTRIES:  Recent Labs     06/11/21  1503 06/12/21  0754 06/13/21  0523    140 140   K 4.7 4.3 4.3    102 101   CO2 28 29 30*   BUN 27* 26* 29*   CREATININE 1.2 1.3* 1.2   GLUCOSE 213* 81 61*     PT/INR:No results for input(s): PROTIME, INR in the last 72 hours. APTT:No results for input(s): APTT in the last 72 hours. LIVER PROFILE:No results for input(s): AST, ALT, BILIDIR, BILITOT, ALKPHOS in the last 72 hours. Imaging Last 24 Hours:  No results found. Assessment//Plan           Hospital Problems         Last Modified POA    * (Principal) Acute CHF (congestive heart failure) ([address] 6/10/2021 Yes    Acute respiratory failure with hypoxia ([address] 6/10/2021 Yes    Congestive heart failure due to cardiomyopathy ([address] 6/10/2021 Yes    Acute diastolic heart failure ([address] 6/12/2021 Yes    Type 2 diabetes mellitus with circulatory disorder, with long-term current use of insulin ([address] 6/12/2021 Yes    Stage 2 chronic kidney disease 6/12/2021 Yes        Assessment:  (Problem List as of 6/13/2021 Reviewed: 7/1/2015 11:06 AM by Eddie Sherman DO    History of carotid endarterectomy    History of left common carotid artery stent placement    Ambulatory dysfunction    Right cataract    Left cataract    Lumbar stenosis    DDD (degenerative disc disease), lumbar    Acute respiratory failure with hypoxia (HCC)    * (Principal) Acute CHF (congestive heart failure) (HCC)    Congestive heart failure due to cardiomyopathy ([address]    Acute diastolic heart failure ([address]    Type 2 diabetes mellitus with circulatory disorder, with long-term   current use of insulin (HCC)    Stage 2 chronic kidney disease    ). Plan:   (Home tomorrow).        Electronically signed by Shamar Anna MD on 6/13/21 at 10:48 PM EDT

## 2021-07-08 NOTE — DISCHARGE SUMMARY
Discharge Summary    Date: 7/7/2021  Patient Name: Hans Mcmillan YOB: 1931 Age: 80 y.o. Admit Date: 6/10/2021  Discharge Date: 6/14/2021  Discharge Condition: Stable    Admission Diagnosis  Acute respiratory failure with hypoxia (HCC) (J96.01); Congestive heart failure due to cardiomyopathy (HCC) (I50.9, I42.9)     Discharge Diagnosis  Principal Problem: Acute CHF (congestive heart failure) (HCC)Active Problems: Acute respiratory failure with hypoxia (HCC) Congestive heart failure due to cardiomyopathy (HCC) Acute diastolic heart failure (HCC)  Type 2 diabetes mellitus with circulatory disorder, with long-term current use of insulin (HCC)  Stage 2 chronic kidney diseaseResolved Problems:  * No resolved hospital problems. [facility] Stay  Narrative of Hospital Course:  Pt with parkinson disease came in with acute chf. During hospital course he was started on iv lasix. Pt cont to breath coby and he has less sob on exertion. Pt was too weak to return to assisted living. He was dc to snf    Consultants:  IP CONSULT TO PRIMARY CARE PROVIDERIP CONSULT TO PRIMARY CARE PROVIDERIP CONSULT TO CARDIOLOGYIP CONSULT TO HEART FAILURE NURSE/COORDINATOR    Surgeries/procedures Performed:       Treatments:    Cardiac Medications and Therapies    Furosemide, PT and OT    Discharge Plan/Disposition:  To [facility]/Incidental Findings Requiring Follow Up:    Patient Instructions:    Diet: Cardiac Diet    Activity:Activity as Tolerated  For number of days (if applicable): Other Instructions:    Provider Follow-Up:   No follow-ups on file.      Significant Diagnostic Studies:    Recent Labs:  Admission on 06/10/2021, Discharged on 06/14/2021Ventricular Rate                              Date: 06/10/2021Value: 72          Ref range: BPM                Status: FinalAtrial Rate                                   Date: 06/10/2021Value: 72          Ref range: BPM                Status: FinalP-R Interval                                  Date: 06/10/2021Value: 170         Ref range: ms                 Status: FinalQRS Duration                                  Date: 06/10/2021Value: 80          Ref range: ms                 Status: FinalQ-T Interval                                  Date: 06/10/2021Value: 428         Ref range: ms                 Status: FinalQTc Calculation (Bazett)                      Date: 06/10/2021Value: 468         Ref range: ms                 Status: FinalP Axis                                        Date: 06/10/2021Value: 48          Ref range: degrees            Status: FinalR Axis                                        Date: 06/10/2021Value: -13         Ref range: degrees            Status: FinalT Axis                                        Date: 06/10/2021Value: 34          Ref range: degrees            Status: [address] [facility]                                           Date: 06/10/2021Value: 7.8         Ref range: 4.5 - 11.5 E9/L    Status: FinalRBC                                           Date: 06/10/2021Value: 3.31*       Ref range: 3.80 - 5.80 E12/L  Status: FinalHemoglobin                                    Date: 06/10/2021Value: 10.9*       Ref range: 12.5 - 16.5 g/dL   Status: FinalHematocrit                                    Date: 06/10/2021Value: 35.3*       Ref range: 37.0 - 54.0 %      Status: FinalMCV                                           Date: 06/10/2021Value: 106.6*      Ref range: 80.0 - 99.9 fL     Status: [address]                                           Date: 06/10/2021Value: 32.9        Ref range: 26.0 - 35.0 pg     Status: [address]                                          Date: 06/10/2021Value: 30.9*       Ref range: 32.0 - 34.5 %      Status: FinalRDW                                           Date: 06/10/2021Value: 16.1*       Ref range: 11.5 - 15.0 fL     Status: FinalPlatelets                                     Date: 06/10/2021Value: 145         Ref range: 130 - 450 E9/L     Status: FinalMPV                                           Date: 06/10/2021Value: 10.2        Ref range: 7.0 - 12.0 fL      Status: FinalNeutrophils %                                 Date: 06/10/2021Value: 77.4        Ref range: 43.0 - 80.0 %      Status: FinalLymphocytes %                                 Date: 06/10/2021Value: 18.3*       Ref range: 20.0 - 42.0 %      Status: FinalMonocytes %                                   Date: 06/10/2021Value: 4.3         Ref range: 2.0 - 12.0 %       Status: FinalEosinophils %                                 Date: 06/10/2021Value: 1.1         Ref range: 0.0 - 6.0 %        Status: FinalBasophils %                                   Date: 06/10/2021Value: 0.4         Ref range: 0.0 - 2.0 %        Status: FinalNeutrophils Absolute                          Date: 06/10/2021Value: 6.01        Ref range: 1.80 - 7.30 E9/L   Status: FinalLymphocytes Absolute                          Date: 06/10/2021Value: 1.40*       Ref range: 1.50 - 4.00 E9/L   Status: FinalMonocytes Absolute                            Date: 06/10/2021Value: 0.31        Ref range: 0.10 - 0.95 E9/L   Status: FinalEosinophils Absolute                          Date: 06/10/2021Value: 0.00*       Ref range: 0.05 - 0.50 E9/L   Status: FinalBasophils Absolute                            Date: 06/10/2021Value: 0.00        Ref range: 0.00 - 0.20 E9/L   Status: FinalAnisocytosis                                  Date: 06/10/2021Value: 1+            Status: FinalPolychromasia                                 Date: 06/10/2021Value: 1+            Status: FinalPoikilocytes                                  Date: 06/10/2021Value: 1+            Status: FinalAcanthocytes                                  Date: 06/10/2021Value: 1+            Status: FinalOvalocytes                                    Date: 06/10/2021Value: 1+            Status: FinalSodium                                        Date: 06/10/2021Value: 140 Ref range: 132 - 146 mmol/L   Status: FinalPotassium reflex Magnesium                    Date: 06/10/2021Value: 4.7         Ref range: 3.5 - 5.0 mmol/L   Status: FinalChloride                                      Date: 06/10/2021Value: 105         Ref range: 98 - 107 mmol/L    Status: FinalCO2                                           Date: 06/10/2021Value: 25          Ref range: 22 - 29 mmol/L     Status: FinalAnion Gap                                     Date: 06/10/2021Value: 10          Ref range: 7 - 16 mmol/L      Status: FinalGlucose                                       Date: 06/10/2021Value: 99          Ref range: 74 - 99 mg/dL      Status: FinalBUN                                           Date: 06/10/2021Value: 20          Ref range: 6 - 23 mg/dL       Status: FinalCREATININE                                    Date: 06/10/2021Value: 1.1         Ref range: 0.7 - 1.2 mg/dL    Status: FinalGFR Non-                      Date: 06/10/2021Value: >60         Ref range: >=60 mL/min/1.73   Status: Final              Comment: Chronic Kidney Disease: less than 60 ml/min/1.73 sq.m. Kidney Failure: less than 15 ml/min/1.73 sq. m. Results valid for patients 18 years and older. GFR                           Date: 06/10/2021Value: >60           Status: FinalCalcium                                       Date: 06/10/2021Value: 8.4*        Ref range: 8.6 - 10.2 mg/dL   Status: FinalTroponin, High Sensitivity                    Date: 06/10/2021Value: 26*         Ref range: 0 - 11 ng/L        Status: Final              Comment: High Sensitivity Troponin values cannot be compared withother Troponin methodologies. Patients with high levels of Biotin oral intake (i.e. >5 mg/day)may have falsely decreased Troponin levels. Samples collectedwithin 8 hours of biotin intake may require additional informationfor diagnosis. Pro-BNP                                       Date: 06/10/2021Value: 1,640*      Ref range: 0 - 450 pg/mL      Status: [id number], NAAT                              Date: 06/10/2021Value: Not Detected                   Ref range: Not Detected       Status: Final              Comment: Rapid NAAT:   Negative results should be treated as presumptive and,if inconsistent with clinical signs and symptoms or necessary forpatient management, should be tested with an alternative molecularassay. Negative results do not preclude SARS-CoV-2 infection andshould not be used as the sole basis for patient management decisions. This test has been authorized by the FDA under an Emergency UseAuthorization (EUA) for use by authorized laboratories. Fact sheet for Healthcare [employer] sheet for Patients: Pounce.dk: Isothermal Nucleic Acid AmplificationEdioponin, High Sensitivity                    Date: 06/10/2021Value: 24*         Ref range: 0 - 11 ng/L        Status: Final              Comment: High Sensitivity Troponin values cannot be compared withother Troponin methodologies. Patients with high levels of Biotin oral intake (i.e. >5 mg/day)may have falsely decreased Troponin levels. Samples collectedwithin 8 hours of biotin intake may require additional informationfor diagnosis. Left Ventricular Ejection Fraction            Date: 06/11/2021Value: 65            Status: Final-EditedLVEF MODALITY                                 Date: 06/11/2021Value: ECHO          Status: Final-EditedWBC                                           Date: 06/11/2021Value: 6.0         Ref range: 4.5 - 11.5 E9/L    Status: FinalRBC                                           Date: 06/11/2021Value: 2.82*       Ref range: 3.80 - 5.80 E12/L  Status: FinalHemoglobin                                    Date: 06/11/2021Value: 9.4*        Ref range: 12.5 - 16.5 g/dL   Status: FinalHematocrit                                    Date: 06/11/2021Value: Kidney Disease: less than 60 ml/min/1.73 sq.m. Kidney Failure: less than 15 ml/min/1.73 sq. m. Results valid for patients 18 years and older. GFR                           Date: 06/11/2021Value: >60           Status: FinalCalcium                                       Date: 06/11/2021Value: 8.5*        Ref range: 8.6 - 10.2 mg/dL   Status: FinalMeter Glucose                                 Date: 06/10/2021Value: 181*        Ref range: 74 - 99 mg/dL      Status: FinalMeter Glucose                                 Date: 06/11/2021Value: 64*         Ref range: 74 - 99 mg/dL      Status: FinalMeter Glucose                                 Date: 06/11/2021Value: 169*        Ref range: 74 - 99 mg/dL      Status: FinalMeter Glucose                                 Date: 06/11/2021Value: 208*        Ref range: 74 - 99 mg/dL      Status: [address] [facility]                                           Date: 06/12/2021Value: 6.5         Ref range: 4.5 - 11.5 E9/L    Status: FinalRBC                                           Date: 06/12/2021Value: 3.15*       Ref range: 3.80 - 5.80 E12/L  Status: FinalHemoglobin                                    Date: 06/12/2021Value: 10.4*       Ref range: 12.5 - 16.5 g/dL   Status: FinalHematocrit                                    Date: 06/12/2021Value: 33.3*       Ref range: 37.0 - 54.0 %      Status: FinalMCV                                           Date: 06/12/2021Value: 105.7*      Ref range: 80.0 - 99.9 fL     [facility]                                           Date: 06/12/2021Value: 33.0        Ref range: 26.0 - 35.0 pg     Status: [address]                                          Date: 06/12/2021Value: 31.2*       Ref range: 32.0 - 34.5 %      Status: FinalRDW                                           Date: 06/12/2021Value: 15.9*       Ref range: 11.5 - 15.0 fL     Status: FinalPlatelets                                     Date: 06/12/2021Value: 156         Ref range: 130 - 450 E9/L     Status: FinalMPV                                           Date: 06/12/2021Value: 10.6        Ref range: 7.0 - 12.0 fL      Status: FinalSodium                                        Date: 06/12/2021Value: 140         Ref range: 132 - 146 mmol/L   Status: FinalPotassium                                     Date: 06/12/2021Value: 4.3         Ref range: 3.5 - 5.0 mmol/L   Status: FinalChloride                                      Date: 06/12/2021Value: 102         Ref range: 98 - 107 mmol/L    Status: FinalCO2                                           Date: 06/12/2021Value: 29          Ref range: 22 - 29 mmol/L     Status: FinalAnion Gap                                     Date: 06/12/2021Value: 9           Ref range: 7 - 16 mmol/L      Status: FinalGlucose                                       Date: 06/12/2021Value: 81          Ref range: 74 - 99 mg/dL      Status: FinalBUN                                           Date: 06/12/2021Value: 26*         Ref range: 6 - 23 mg/dL       Status: FinalCREATININE                                    Date: 06/12/2021Value: 1.3*        Ref range: 0.7 - 1.2 mg/dL    Status: FinalGFR Non-                      Date: 06/12/2021Value: 52          Ref range: >=60 mL/min/1.73   Status: Final              Comment: Chronic Kidney Disease: less than 60 ml/min/1.73 sq.m. Kidney Failure: less than 15 ml/min/1.73 sq. m. Results valid for patients 18 years and older. GFR                           Date: 06/12/2021Value: >60           Status: FinalCalcium                                       Date: 06/12/2021Value: 8.7         Ref range: 8.6 - 10.2 mg/dL   Status: FinalMeter Glucose                                 Date: 06/11/2021Value: 125*        Ref range: 74 - 99 mg/dL      Status: FinalMeter Glucose                                 Date: 06/12/2021Value: 89          Ref range: 74 - 99 mg/dL      Status: FinalMeter Glucose Date: 06/12/2021Value: 183*        Ref range: 74 - 99 mg/dL      Status: FinalMeter Glucose                                 Date: 06/12/2021Value: 160*        Ref range: 74 - 99 mg/dL      Status: [address] [facility]                                           Date: 06/13/2021Value: 6.1         Ref range: 4.5 - 11.5 E9/L    Status: FinalRBC                                           Date: 06/13/2021Value: 3.04*       Ref range: 3.80 - 5.80 E12/L  Status: FinalHemoglobin                                    Date: 06/13/2021Value: 10.2*       Ref range: 12.5 - 16.5 g/dL   Status: FinalHematocrit                                    Date: 06/13/2021Value: 32.2*       Ref range: 37.0 - 54.0 %      Status: FinalMCV                                           Date: 06/13/2021Value: 105.9*      Ref range: 80.0 - 99.9 fL     Status: [address]                                           Date: 06/13/2021Value: 33.6        Ref range: 26.0 - 35.0 pg     Status: [address] [facility]                                          Date: 06/13/2021Value: 31.7*       Ref range: 32.0 - 34.5 %      Status: FinalRDW                                           Date: 06/13/2021Value: 15.9*       Ref range: 11.5 - 15.0 fL     Status: FinalPlatelets                                     Date: 06/13/2021Value: 156         Ref range: 130 - 450 E9/L     Status: FinalMPV                                           Date: 06/13/2021Value: 10.3        Ref range: 7.0 - 12.0 fL      Status: FinalSodium                                        Date: 06/13/2021Value: 140         Ref range: 132 - 146 mmol/L   Status: FinalPotassium                                     Date: 06/13/2021Value: 4.3         Ref range: 3.5 - 5.0 mmol/L   Status: FinalChloride                                      Date: 06/13/2021Value: 101         Ref range: 98 - 107 mmol/L    Status: FinalCO2                                           Date: 06/13/2021Value: 30*         Ref range: 22 - 29 mmol/L     Status: FinalAnion Gap                                     Date: 06/13/2021Value: 9           Ref range: 7 - 16 mmol/L      Status: FinalGlucose                                       Date: 06/13/2021Value: 61*         Ref range: 74 - 99 mg/dL      Status: FinalBUN                                           Date: 06/13/2021Value: 29*         Ref range: 6 - 23 mg/dL       Status: FinalCREATININE                                    Date: 06/13/2021Value: 1.2         Ref range: 0.7 - 1.2 mg/dL    Status: FinalGFR Non-                      Date: 06/13/2021Value: 57          Ref range: >=60 mL/min/1.73   Status: Final              Comment: Chronic Kidney Disease: less than 60 ml/min/1.73 sq.m. Kidney Failure: less than 15 ml/min/1.73 sq. m. Results valid for patients 18 years and older. GFR                           Date: 06/13/2021Value: >60           Status: FinalCalcium                                       Date: 06/13/2021Value: 8.6         Ref range: 8.6 - 10.2 mg/dL   Status: FinalMeter Glucose                                 Date: 06/12/2021Value: 212*        Ref range: 74 - 99 mg/dL      Status: FinalMeter Glucose                                 Date: 06/13/2021Value: 72*         Ref range: 74 - 99 mg/dL      Status: FinalMeter Glucose                                 Date: 06/13/2021Value: 173*        Ref range: 74 - 99 mg/dL      Status: FinalMeter Glucose                                 Date: 06/13/2021Value: 166*        Ref range: 74 - 99 mg/dL      Status: [address] [facility]                                           Date: 06/14/2021Value: 7.1         Ref range: 4.5 - 11.5 E9/L    Status: FinalRBC                                           Date: 06/14/2021Value: 3.00*       Ref range: 3.80 - 5.80 E12/L  Status: FinalHemoglobin                                    Date: 06/14/2021Value: 10.0*       Ref range: 12.5 - 16.5 g/dL   Status: FinalHematocrit                                    Date: 06/14/2021Value: 31.9*       Ref range: 37.0 - 54.0 %      Status: FinalMCV                                           Date: 06/14/2021Value: 106.3*      Ref range: 80.0 - 99.9 fL     Status: [facility]                                           Date: 06/14/2021Value: 33.3        Ref range: 26.0 - 35.0 pg     Status: [address]                                          Date: 06/14/2021Value: 31.3*       Ref range: 32.0 - 34.5 %      Status: FinalRDW                                           Date: 06/14/2021Value: 15.6*       Ref range: 11.5 - 15.0 fL     Status: FinalPlatelets                                     Date: 06/14/2021Value: 161         Ref range: 130 - 450 E9/L     Status: FinalMPV                                           Date: 06/14/2021Value: 10.6        Ref range: 7.0 - 12.0 fL      Status: FinalSodium                                        Date: 06/14/2021Value: 141         Ref range: 132 - 146 mmol/L   Status: FinalPotassium                                     Date: 06/14/2021Value: 4.0         Ref range: 3.5 - 5.0 mmol/L   Status: FinalChloride                                      Date: 06/14/2021Value: 103         Ref range: 98 - 107 mmol/L    Status: FinalCO2                                           Date: 06/14/2021Value: 30*         Ref range: 22 - 29 mmol/L     Status: FinalAnion Gap                                     Date: 06/14/2021Value: 8           Ref range: 7 - 16 mmol/L      Status: FinalGlucose                                       Date: 06/14/2021Value: 83          Ref range: 74 - 99 mg/dL      Status: FinalBUN                                           Date: 06/14/2021Value: 32*         Ref range: 6 - 23 mg/dL       Status: FinalCREATININE                                    Date: 06/14/2021Value: 1.3*        Ref range: 0.7 - 1.2 mg/dL    Status: FinalGFR Non-                      Date: 06/14/2021Value: 52          Ref range: >=60 mL/min/1.73   Status: Final Comment: Chronic Kidney Disease: less than 60 ml/min/1.73 sq.m. Kidney Failure: less than 15 ml/min/1.73 sq. m. Results valid for patients 18 years and older. GFR                           Date: 06/14/2021Value: >60           Status: FinalCalcium                                       Date: 06/14/2021Value: 8.7         Ref range: 8.6 - 10.2 mg/dL   Status: FinalMeter Glucose                                 Date: 06/13/2021Value: 175*        Ref range: 74 - 99 mg/dL      Status: FinalMeter Glucose                                 Date: 06/14/2021Value: 77          Ref range: 74 - 99 mg/dL      Status: FinalMeter Glucose                                 Date: 06/14/2021Value: 176*        Ref range: 74 - 99 mg/dL      Status: Final------------    Radiology last 7 days:  No results found.      [unfilled]    Discharge Medications    Discharge Medication List as of 6/14/2021  3:21 PMSTART taking these medicationsspironolactone (ALDACTONE) 25 MG tabletTake 1 tablet by mouth daily, Disp-30 tablet, R-3Normal    Discharge Medication List as of 6/14/2021  3:21 PM    Discharge Medication List as of 6/14/2021  3:21 PMCONTINUE these medications which have NOT CHANGEDcarbidopa-levodopa (SINEMET)  MG per tabletTake 1 tablet by mouth 3 times dailyHistorical Med!! dapsone 25 MG tabletTake 50 mg by mouth daily (with breakfast)Historical MedFLUoxetine (PROZAC) 10 MG tabletTake 10 mg by mouth nightlyHistorical Medfluticasone (FLONASE) 50 MCG/ACT nasal spray2 sprays by Each Nostril route dailyHistorical Medaspirin 81 MG chewable tabletTake 81 mg by mouth dailyHistorical MedcloNIDine (CATAPRES) 0.3 MG tabletTake 0.3 mg by mouth 2 times dailyHistorical Med!! insulin lispro protamine & lispro (HUMALOG MIX) (75-25) 100 UNIT per ML SUSP injection vialInject 25 Units into the skin every morning Historical Med!! insulin lispro protamine & lispro (HUMALOG MIX) (75-25) 100 UNIT per ML SUSP injection vialInject 15 Units into the skin nightly Historical MedMAGNESIUM POTake 400 mg by mouth daily Historical Medisosorbide mononitrate (IMDUR) 30 MG extended release tabletTake 60 mg by mouth daily Historical Medfurosemide (LASIX) 20 MG tabletTake 20 mg by mouth daily Historical Medrosuvastatin (CRESTOR) 5 MG tabletTake 5 mg by mouth every other dayHistorical Medtamsulosin (FLOMAX) 0.4 MG capsuleTake 0.8 mg by mouth nightly , R-0Historical Medmetoprolol tartrate (LOPRESSOR) 50 MG tabletTake 50 mg by mouth 2 times dailyHistorical Medlosartan (COZAAR) 100 MG tabletTake 100 mg by mouth daily. Historical Medamlodipine (NORVASC) 5 MG tabletTake 5 mg by mouth daily Historical MednitroGLYCERIN (NITROSTAT) 0.4 MG SL tabletPlace 0.4 mg under the tongue every 5 minutes as needed. Historical Medclopidogrel (PLAVIX) 75 MG tabletTake 75 mg by mouth daily. Historical Med!! dapsone 25 MG tabletTake 25 mg by mouth nightly Historical Med!! - Potential duplicate medications found. Please discuss with provider. Discharge Medication List as of 6/14/2021  3:21 PMSTOP taking these medicationstriamcinolone (KENALOG) 0.1 % creamComments:Reason for Stopping:pantoprazole (PROTONIX) 40 MG tabletComments:Reason for Stopping:    Time Spent on Discharge:E] minutes were spent in patient examination, evaluation, counseling as well as medication reconciliation, prescriptions for required medications, discharge plan, and follow up.     Electronically signed by Amber Barrett MD on 7/7/21 at 9:32 PM EDT

## 2021-08-02 NOTE — ED NOTES
Bed: 03  Expected date: 8/2/21  Expected time: 9:26 AM  Means of arrival:   Comments:  geoff Real RN  08/02/21 [id number]

## 2021-08-02 NOTE — ED NOTES
Patient refused to change into hospital gown, states \"is that really necessary? \". Placed on telemetry monitor and continuous pulse ox at this time. Rhythm strip printed and placed in patient folder. Patient given warm blanket.          Sveta Pace RN  08/02/21 [id number]

## 2021-08-02 NOTE — ED NOTES
Per [facility] paperwork, patient dnr cca, actual form not included. [facility] unit secretary calling for them to fax it.      Diana Albert, RN  08/02/21 [id number]

## 2021-08-02 NOTE — ED NOTES
EKG completed, placed in patient chart, and given to Dr. Solis Short at bedside.        Alberta Lynch RN  08/02/21 [id number]

## 2021-08-02 NOTE — ED PROVIDER NOTES
The history is provided by the patient. Chest Pain  Pain location:  Substernal area  Pain quality: aching    Pain quality comment:  Feels like musculoskeletal pain, started after physical therapy  Pain radiates to:  Does not radiate  Pain severity:  Mild  Onset quality:  Gradual  Duration:  3 days  Timing:  Constant  Progression:  Unchanged  Chronicity:  New  Context: at rest    Relieved by:  Nothing  Worsened by:  Nothing  Ineffective treatments:  None tried  Associated symptoms: lower extremity edema    Associated symptoms: no abdominal pain, no anorexia, no back pain, no claudication, no cough, no diaphoresis, no fatigue, no nausea, no palpitations, no shortness of breath, no syncope and no weakness    Risk factors: diabetes mellitus and male sex    Risk factors: no coronary artery disease and not obese    Risk factors comment:  Chf      Review of Systems   Constitutional: Positive for activity change. Negative for diaphoresis and fatigue. HENT: Negative. Respiratory: Negative for cough, chest tightness, shortness of breath and wheezing. Cardiovascular: Positive for chest pain and leg swelling. Negative for palpitations, claudication and syncope. Gastrointestinal: Negative for abdominal pain, anorexia, diarrhea and nausea. Genitourinary: Negative. Musculoskeletal: Negative for back pain. Skin: Negative. Neurological: Negative for weakness and light-headedness. Physical Exam  Vitals and nursing note reviewed. Constitutional:       General: He is not in acute distress. Appearance: Normal appearance. He is well-developed and normal weight. He is not ill-appearing or toxic-appearing. HENT:      Head: Normocephalic and atraumatic. Nose: Nose normal.      Mouth/Throat:      Mouth: Mucous membranes are moist.      Pharynx: Oropharynx is clear. Eyes:      Pupils: Pupils are equal, round, and reactive to light.    Cardiovascular:      Rate and Rhythm: Normal rate and regular rhythm. Pulses: Normal pulses. Heart sounds: Normal heart sounds. No murmur heard. Pulmonary:      Effort: Pulmonary effort is normal. No respiratory distress. Breath sounds: Normal breath sounds. No wheezing or rales. Abdominal:      General: Bowel sounds are normal.      Palpations: Abdomen is soft. Tenderness: There is no abdominal tenderness. There is no guarding or rebound. Musculoskeletal:         General: No tenderness. Normal range of motion. Cervical back: Normal range of motion and neck supple. No rigidity. Right lower leg: Edema present. Left lower leg: Edema present. Skin:     General: Skin is warm and dry. Capillary Refill: Capillary refill takes less than 2 seconds. Coloration: Skin is not pale. Findings: No erythema. Neurological:      General: No focal deficit present. Mental Status: He is alert and oriented to person, place, and time. Mental status is at baseline. Cranial Nerves: No cranial nerve deficit. Coordination: Coordination normal.   Psychiatric:         Mood and Affect: Mood normal.         Behavior: Behavior normal.         Thought Content:  Thought content normal.         Judgment: Judgment normal.         Procedures    MDM       EKG Interpretation    Interpreted by emergency department physician    Rhythm: sinus bradycardia  Rate: 50-60  Axis: left  Ectopy: none  Conduction: 1st degree AV block  ST Segments: no acute change  T Waves: no acute change  Q Waves: none    Clinical Impression: non-specific EKG    Sharron Delgado DO       --------------------------------------------- PAST HISTORY ---------------------------------------------  Past Medical History:  has a past medical history of Acute on chronic diastolic (congestive) heart failure (HCC), Ambulatory dysfunction, Benign prostate hyperplasia, Blood transfusion, CAD (coronary artery disease), Cataract, [facility] Chronic idiopathic constipation, Diabetes mellitus ([facility] Dysphagia, History of carotid endarterectomy, History of left common carotid artery stent placement, Hyperlipidemia, Hypertension, Parkinson's disease ([address] and Spinal stenosis, lumbar. Past Surgical History:  has a past surgical history that includes back surgery; Carotid endarterectomy (rt and left stent in rt); Tonsillectomy; Colonoscopy; Upper gastrointestinal endoscopy; Cardiac surgery (2000); Endoscopy, colon, diagnostic; AAA repair, endovascular (1 year ago); Cataract removal with implant (Right, 04/08/14); and Cataract removal with implant (Left, 9 23 14). Social History:  reports that he has quit smoking. He has never used smokeless tobacco. He reports that he does not drink alcohol and does not use drugs. Family History: family history is not on file. The patients home medications have been reviewed. Allergies: Patient has no known allergies.     -------------------------------------------------- RESULTS -------------------------------------------------  Labs:  Results for orders placed or performed during the hospital encounter of 28/54/86   Basic metabolic panel   Result Value Ref Range    Sodium 137 132 - 146 mmol/L    Potassium 5.0 3.5 - 5.0 mmol/L    Chloride 102 98 - 107 mmol/L    CO2 27 22 - 29 mmol/L    Anion Gap 8 7 - 16 mmol/L    Glucose 138 (H) 74 - 99 mg/dL    BUN 34 (H) 6 - 23 mg/dL    CREATININE 1.3 (H) 0.7 - 1.2 mg/dL    GFR Non-African American 52 >=60 mL/min/1.73    GFR African American >60     Calcium 8.4 (L) 8.6 - 10.2 mg/dL   CBC   Result Value Ref Range    WBC 5.7 4.5 - 11.5 E9/L    RBC 3.25 (L) 3.80 - 5.80 E12/L    Hemoglobin 10.8 (L) 12.5 - 16.5 g/dL    Hematocrit 34.3 (L) 37.0 - 54.0 %    .5 (H) 80.0 - 99.9 fL    MCH 33.2 26.0 - 35.0 pg    MCHC 31.5 (L) 32.0 - 34.5 %    RDW 13.2 11.5 - 15.0 fL    Platelets 110 972 - 633 E9/L    MPV 10.7 7.0 - 12.0 fL   Troponin   Result Value Ref Range    Troponin, High Sensitivity 27 (H) 0 - 11 ng/L   Brain Natriuretic Peptide   Result Value Ref Range    Pro- 0 - 450 pg/mL   EKG 12 Lead   Result Value Ref Range    Ventricular Rate 55 BPM    Atrial Rate 55 BPM    P-R Interval 224 ms    QRS Duration 86 ms    Q-T Interval 466 ms    QTc Calculation (Bazett) 445 ms    P Axis 73 degrees    R Axis -32 degrees    T Axis 5 degrees       Radiology:  XR CHEST PORTABLE   Final Result   1. No acute cardiopulmonary abnormality. 2. No radiographic evidence of any significant CHF exacerbation.             ------------------------- NURSING NOTES AND VITALS REVIEWED ---------------------------  Date / Time Roomed:  8/2/2021  9:34 AM  ED Bed Assignment:  03/03    The nursing notes within the ED encounter and vital signs as below have been reviewed. BP (!) 122/53   Pulse 53   Temp 97.5 °F (36.4 °C)   Resp 18   Wt 168 lb (76.2 kg)   SpO2 96%   BMI 26.31 kg/m²   Oxygen Saturation Interpretation: Normal      ------------------------------------------ PROGRESS NOTES ------------------------------------------  I have spoken with the patient and discussed todays results, in addition to providing specific details for the plan of care and counseling regarding the diagnosis and prognosis. Their questions are answered at this time and they are agreeable with the plan. I discussed at length with them reasons for immediate return here for re evaluation. They will followup with primary care by calling their office tomorrow. 12:18 PM  Patient states he continues to feel well and prefers discharge. ED course discussed with Dr Chiara Rios. He will follow as an outpatient. Patient understands reasons to return to the ED.    --------------------------------- ADDITIONAL PROVIDER NOTES ---------------------------------  At this time the patient is without objective evidence of an acute process requiring hospitalization or inpatient management.   They have remained hemodynamically stable throughout their entire ED visit and are stable for discharge with outpatient follow-up. The plan has been discussed in detail and they are aware of the specific conditions for emergent return, as well as the importance of follow-up. New Prescriptions    No medications on file       Diagnosis:  1. Chest pain, unspecified type        Disposition:  Patient's disposition: Discharge to home  Patient's condition is stable.          Fariba White DO  08/02/21 1215

## 2021-08-02 NOTE — ED NOTES
FAMILY NOTIFIED PT WILL RETURN TO [facility],.  UNIT SECRETARY ARRANGING TRANSPORT     Rigoberto Wallace RN  08/02/21 3013

## 2021-08-02 NOTE — ED NOTES
Trinh Conception daughter [phone], RN  08/02/21 [address] [facility], [address] [place] [address]  08/02/21 1120

## 2021-08-03 NOTE — ED NOTES
Pt provided with turkey sandwich, apple sauce and apple juice at this time.      Prema Ugarte, ROBB  08/03/21 2368

## 2021-08-03 NOTE — ED PROVIDER NOTES
[address] ENCOUNTER      Pt Name: Fallon Martinez  MRN: [id number]  [place] 7/4/1931  Date of evaluation: 8/3/2021      CHIEF COMPLAINT       Chief Complaint   Patient presents with    Hypotension     low BP per NH, dementia patient, DNR CCA, usually on 2L NC, no complaints per patient         HPI  Fallon Martinez is a 80 y.o. male with pmhx of CHF, CAD, PD and dementia presents from nursing home with hypotension. Pt states he felt dizzy at around 1230 last night, he fell on the way to the bathroom and hit his head on the R side, denies LOC, denies any bleeding or wound in the area. Pt states this morning he felt dizzy again but denies any falls. Denies any fever, chills, n/v, headache, weakness, cp, palpitations, sob, cough, abd pain, dysuria, hematuria, diarrhea, constipation. Spoke to Nurse aleman who states pt woke up alert and oriented, Bp 166/74 RR 91. She states she wasn't aware of any falls last night, pt was stable before breakfast but after breakfast pt started complaining of dizziness, BP at that time was 68/34 RR 98, Sat 95% POC Glucose 98. Prior to calling EMS she re-measured BP 75/36, saturating 91% pt was put on 3L prior to leaving nursing home. States patient's blood pressures are unstable especially after medications. Except as noted above the remainder of the review of systems was reviewed and negative. Review of Systems   Constitutional: Negative for appetite change, chills, fatigue and fever. HENT: Negative for congestion and sore throat. Eyes: Negative for visual disturbance. Respiratory: Negative for cough, choking, chest tightness and shortness of breath. Cardiovascular: Negative for chest pain, palpitations and leg swelling. Gastrointestinal: Negative for abdominal pain, blood in stool, constipation, diarrhea, nausea and vomiting. Endocrine: Negative for polyphagia.    Genitourinary: Negative for decreased urine volume, difficulty urinating, flank pain and hematuria. Musculoskeletal: Negative for arthralgias, gait problem, joint swelling and myalgias. Skin: Negative for color change, pallor, rash and wound. Neurological: Negative for dizziness, tremors, seizures, syncope, weakness, light-headedness, numbness and headaches. Hematological: Negative for adenopathy. Does not bruise/bleed easily. Psychiatric/Behavioral: Negative for confusion and hallucinations. All other systems reviewed and are negative. Physical Exam  Vitals reviewed. Constitutional:       General: He is not in acute distress. Appearance: Normal appearance. He is normal weight. He is not ill-appearing, toxic-appearing or diaphoretic. HENT:      Head: Normocephalic and atraumatic. Right Ear: External ear normal.      Left Ear: External ear normal.      Nose: Nose normal. No congestion or rhinorrhea. Mouth/Throat:      Mouth: Mucous membranes are moist.      Pharynx: Oropharynx is clear. No oropharyngeal exudate or posterior oropharyngeal erythema. Eyes:      Extraocular Movements: Extraocular movements intact. Conjunctiva/sclera: Conjunctivae normal.      Pupils: Pupils are equal, round, and reactive to light. Cardiovascular:      Rate and Rhythm: Normal rate and regular rhythm. Pulses: Normal pulses. Pulmonary:      Effort: Pulmonary effort is normal. No respiratory distress. Breath sounds: Normal breath sounds. No wheezing or rhonchi. Chest:      Chest wall: No tenderness. Abdominal:      General: Abdomen is flat. Bowel sounds are normal. There is no distension. Palpations: Abdomen is soft. Tenderness: There is no abdominal tenderness. There is no right CVA tenderness, left CVA tenderness or guarding. Hernia: No hernia is present. Musculoskeletal:         General: No tenderness. Cervical back: Normal range of motion. Right lower leg: No edema. Left lower leg: No edema. Skin:     General: Skin is warm and dry. Capillary Refill: Capillary refill takes less than 2 seconds. Neurological:      General: No focal deficit present. Mental Status: He is alert and oriented to person, place, and time. Mental status is at baseline. Psychiatric:         Mood and Affect: Mood normal.         Behavior: Behavior normal.         Thought Content: Thought content normal.         Judgment: Judgment normal.          Procedures     MDM    80 y.o. male  Presents from nursing home with hypotension. On arrival pt's BP was 91/36 improved throughout ED stay last blood pressure 151/110. Patient was alert and oriented x3 on arrival, but hard to obtain history due to dementia. Nurse Gilda Lorenzo from when [employer] called who states patient has labile blood pressureespecially after medications. Given questional fall last night, CT head obtained but neg for acute bleeding or pathology. Troponin initially 28 but trending down, CXR unremarkable. pt labs remarkable for macrocytic anemia (chronic) but otherwise unremarkable. Pt tolerating po in the ED, HD stable, comfortable returning to nursing home. Pt educated on importance of BP control at nursing home (either through dosage or medication changes) to avoid future hypotensive episodes. ED Course as of Aug 04 0648   Tue Aug 03, 2021   1150 Spoke to Nurse Gilda Lorenzo from [employer] who states this morning after breakfast pt was complaining of dizziness, BP was measured 68/34 RR 98 sat 95% POC-Glucose 98. She re measured BP 75/36  before calling EMS. Nurse states pt's BP is very labile baseline ranging from highest 209/82 to lowest 79/35. Nurse denies any falls last night, pt has been alert and oriented throughout event. [TC]   1607 Troponin trending down. Spoke to family about importance of better BP control, they will speak to doctors at nursing home. BP has remained stable throughout ED stay.  Pt feels comfortable going back to nursing home at the moment. [TC]      ED Course User Index  [TC] Josefina Alanis MD       --------------------------------------------- PAST HISTORY ---------------------------------------------  Past Medical History:  has a past medical history of Acute on chronic diastolic (congestive) heart failure ([facility] Ambulatory dysfunction, Benign prostate hyperplasia, Blood transfusion, CAD (coronary artery disease), Cataract, CHF (congestive heart failure) ([facility] Chronic idiopathic constipation, Diabetes mellitus ([facility] Dysphagia, History of carotid endarterectomy, History of left common carotid artery stent placement, Hyperlipidemia, Hypertension, Parkinson's disease ([facility] and Spinal stenosis, lumbar. Past Surgical History:  has a past surgical history that includes back surgery; Carotid endarterectomy (rt and left stent in rt); Tonsillectomy; Colonoscopy; Upper gastrointestinal endoscopy; Cardiac surgery (2000); Endoscopy, colon, diagnostic; AAA repair, endovascular (1 year ago); Cataract removal with implant (Right, 04/08/14); and Cataract removal with implant (Left, 9 23 14). Social History:  reports that he has quit smoking. He has never used smokeless tobacco. He reports that he does not drink alcohol and does not use drugs. Family History: family history is not on file. The patients home medications have been reviewed. Allergies: Patient has no known allergies.     -------------------------------------------------- RESULTS -------------------------------------------------  Labs:  Results for orders placed or performed during the hospital encounter of 08/03/21   CBC Auto Differential   Result Value Ref Range    WBC 7.2 4.5 - 11.5 E9/L    RBC 3.02 (L) 3.80 - 5.80 E12/L    Hemoglobin 10.1 (L) 12.5 - 16.5 g/dL    Hematocrit 31.7 (L) 37.0 - 54.0 %    .0 (H) 80.0 - 99.9 fL    MCH 33.4 26.0 - 35.0 pg    MCHC 31.9 (L) 32.0 - 34.5 %    RDW 13.2 11.5 - 15.0 fL    Platelets 161 130 - 450 E9/L    MPV 10.8 7.0 - 12.0 fL    Neutrophils % 73.2 43.0 - 80.0 %    Immature Granulocytes % 0.4 0.0 - 5.0 %    Lymphocytes % 16.9 (L) 20.0 - 42.0 %    Monocytes % 8.6 2.0 - 12.0 %    Eosinophils % 0.6 0.0 - 6.0 %    Basophils % 0.3 0.0 - 2.0 %    Neutrophils Absolute 5.29 1.80 - 7.30 E9/L    Immature Granulocytes # 0.03 E9/L    Lymphocytes Absolute 1.22 (L) 1.50 - 4.00 E9/L    Monocytes Absolute 0.62 0.10 - 0.95 E9/L    Eosinophils Absolute 0.04 (L) 0.05 - 0.50 E9/L    Basophils Absolute 0.02 0.00 - 0.20 E9/L   Comprehensive Metabolic Panel w/ Reflex to MG   Result Value Ref Range    Sodium 135 132 - 146 mmol/L    Potassium reflex Magnesium 4.9 3.5 - 5.0 mmol/L    Chloride 103 98 - 107 mmol/L    CO2 23 22 - 29 mmol/L    Anion Gap 9 7 - 16 mmol/L    Glucose 62 (L) 74 - 99 mg/dL    BUN 28 (H) 6 - 23 mg/dL    CREATININE 1.4 (H) 0.7 - 1.2 mg/dL    GFR Non-African American 48 >=60 mL/min/1.73    GFR African American 58     Calcium 8.0 (L) 8.6 - 10.2 mg/dL    Total Protein 5.6 (L) 6.4 - 8.3 g/dL    Albumin 3.2 (L) 3.5 - 5.2 g/dL    Total Bilirubin 0.5 0.0 - 1.2 mg/dL    Alkaline Phosphatase 89 40 - 129 U/L    ALT <5 0 - 40 U/L    AST 16 0 - 39 U/L   Troponin   Result Value Ref Range    Troponin, High Sensitivity 32 (H) 0 - 11 ng/L   Urinalysis, reflex to microscopic   Result Value Ref Range    Color, UA Yellow Straw/Yellow    Clarity, UA Clear Clear    Glucose, Ur Negative Negative mg/dL    Bilirubin Urine Negative Negative    Ketones, Urine Negative Negative mg/dL    Specific Gravity, UA 1.010 1.005 - 1.030    Blood, Urine Negative Negative    pH, UA 6.0 5.0 - 9.0    Protein, UA TRACE Negative mg/dL    Urobilinogen, Urine 0.2 <2.0 E.U./dL    Nitrite, Urine Negative Negative    Leukocyte Esterase, Urine Negative Negative   Lactic Acid, Plasma   Result Value Ref Range    Lactic Acid 2.6 (H) 0.5 - 2.2 mmol/L   Troponin   Result Value Ref Range    Troponin, High Sensitivity 28 (H) 0 - 11 ng/L   Microscopic Urinalysis   Result Value Ref Range    WBC, UA 1-3 0 - 5 /HPF    RBC, UA 0-1 0 - 2 /HPF    Bacteria, UA RARE (A) None Seen /HPF   Troponin   Result Value Ref Range    Troponin, High Sensitivity 26 (H) 0 - 11 ng/L   EKG 12 Lead   Result Value Ref Range    Ventricular Rate 61 BPM    Atrial Rate 61 BPM    P-R Interval 230 ms    QRS Duration 88 ms    Q-T Interval 452 ms    QTc Calculation (Bazett) 455 ms    P Axis 75 degrees    R Axis -34 degrees    T Axis 7 degrees       Radiology:  XR CHEST 1 VIEW   Final Result   1. There are no findings of failure or pneumonia. CT Head WO Contrast   Final Result   1. There is no acute intracranial abnormality. Specifically, there is no   intracranial hemorrhage. 2. Atrophy and periventricular leukomalacia,             ------------------------- NURSING NOTES AND VITALS REVIEWED ---------------------------  Date / Time Roomed:  8/3/2021 11:04 AM  ED Bed Assignment:  09/09    The nursing notes within the ED encounter and vital signs as below have been reviewed. BP (!) 151/110   Pulse 94   Temp 98 °F (36.7 °C) (Oral)   Resp 27   Ht 5' 7\" (1.702 m)   Wt 170 lb (77.1 kg)   SpO2 96%   BMI 26.63 kg/m²   Oxygen Saturation Interpretation: Normal      ------------------------------------------ PROGRESS NOTES ------------------------------------------  6:48 AM EDT  I have spoken with the patient and discussed todays results, in addition to providing specific details for the plan of care and counseling regarding the diagnosis and prognosis. Their questions are answered at this time and they are agreeable with the plan. I discussed at length with them reasons for immediate return here for re evaluation. They will followup with their primary care physician by calling their office tomorrow.       --------------------------------- ADDITIONAL PROVIDER NOTES ---------------------------------  At this time the patient is without objective evidence of an acute process requiring hospitalization or inpatient management. They have remained hemodynamically stable throughout their entire ED visit and are stable for discharge with outpatient follow-up. The plan has been discussed in detail and they are aware of the specific conditions for emergent return, as well as the importance of follow-up. Discharge Medication List as of 8/3/2021  6:50 PM          Diagnosis:  1. Hypotensive episode        Disposition:  Patient's disposition: Discharge to nursing home  Patient's condition is stable.        Percy Nunez MD  Resident  08/04/21 0646

## 2021-08-04 NOTE — ED PROVIDER NOTES
Chief complaint:  Hypoglycemia    HPI history provided by patient and report  Patient was here yesterday for fatigue and hypotension I believe. Presents here today after being back to the nursing home seem to be a little more fatigued this morning and less with it so they checked her sugar and found to be in the 40s. Given glucose prior to arrival, EMS had his blood sugar in the 90s, he is about 137 upon arrival here. He has no physical complaints, denying any chest pain, palpitations or shortness of breath. Denies abdominal pain. Denies headache. States he feels fine. No fevers. No other reported treatment prior to arrival.    Review of Systems   Constitutional: Positive for fatigue. Negative for chills, diaphoresis and fever. HENT: Negative for congestion and sore throat. Respiratory: Negative for cough, chest tightness, shortness of breath and wheezing. Cardiovascular: Negative for chest pain and palpitations. Gastrointestinal: Negative for abdominal pain, diarrhea, nausea and vomiting. Genitourinary: Negative for dysuria, flank pain, frequency and urgency. Musculoskeletal: Negative for arthralgias, back pain, gait problem, joint swelling, myalgias, neck pain and neck stiffness. Skin: Negative for rash and wound. Neurological: Negative for dizziness, seizures, syncope, weakness, light-headedness, numbness and headaches. Hematological: Negative for adenopathy. All other systems reviewed and are negative. Physical Exam  Vitals and nursing note reviewed. Constitutional:       General: He is not in acute distress. Appearance: He is well-developed. He is not ill-appearing, toxic-appearing or diaphoretic. Comments: Patient awake and alert, slow responding with clear speech and no facial droop, generally just slowed responses. HENT:      Head: Normocephalic and atraumatic. Eyes:      General: No scleral icterus.      Pupils: Pupils are equal, round, and reactive to (L) 3.80 - 5.80 E12/L    Hemoglobin 12.2 (L) 12.5 - 16.5 g/dL    Hematocrit 38.3 37.0 - 54.0 %    .4 (H) 80.0 - 99.9 fL    MCH 33.2 26.0 - 35.0 pg    MCHC 31.9 (L) 32.0 - 34.5 %    RDW 13.2 11.5 - 15.0 fL    Platelets 249 187 - 187 E9/L    MPV 10.6 7.0 - 12.0 fL    Neutrophils % 88.7 (H) 43.0 - 80.0 %    Lymphocytes % 5.2 (L) 20.0 - 42.0 %    Monocytes % 6.1 2.0 - 12.0 %    Eosinophils % 0.1 0.0 - 6.0 %    Basophils % 0.2 0.0 - 2.0 %    Neutrophils Absolute 9.08 (H) 1.80 - 7.30 E9/L    Lymphocytes Absolute 0.51 (L) 1.50 - 4.00 E9/L    Monocytes Absolute 0.61 0.10 - 0.95 E9/L    Eosinophils Absolute 0.00 (L) 0.05 - 0.50 E9/L    Basophils Absolute 0.00 0.00 - 0.20 E9/L   Comprehensive Metabolic Panel   Result Value Ref Range    Sodium 140 132 - 146 mmol/L    Potassium 4.5 3.5 - 5.0 mmol/L    Chloride 105 98 - 107 mmol/L    CO2 24 22 - 29 mmol/L    Anion Gap 11 7 - 16 mmol/L    Glucose 137 (H) 74 - 99 mg/dL    BUN 24 (H) 6 - 23 mg/dL    CREATININE 1.2 0.7 - 1.2 mg/dL    GFR Non-African American 57 >=60 mL/min/1.73    GFR African American >60     Calcium 8.6 8.6 - 10.2 mg/dL    Total Protein 6.3 (L) 6.4 - 8.3 g/dL    Albumin 3.6 3.5 - 5.2 g/dL    Total Bilirubin 0.5 0.0 - 1.2 mg/dL    Alkaline Phosphatase 99 40 - 129 U/L    ALT 20 0 - 40 U/L    AST 18 0 - 39 U/L   Protime-INR   Result Value Ref Range    Protime 11.9 9.3 - 12.4 sec    INR 1.0    APTT   Result Value Ref Range    aPTT 30.0 24.5 - 35.1 sec   Brain Natriuretic Peptide   Result Value Ref Range    Pro-BNP 1,561 (H) 0 - 450 pg/mL   Troponin   Result Value Ref Range    Troponin, High Sensitivity 38 (H) 0 - 11 ng/L   Urinalysis   Result Value Ref Range    Color, UA Yellow Straw/Yellow    Clarity, UA Clear Clear    Glucose, Ur Negative Negative mg/dL    Bilirubin Urine Negative Negative    Ketones, Urine Negative Negative mg/dL    Specific Gravity, UA 1.020 1.005 - 1.030    Blood, Urine Negative Negative    pH, UA 6.0 5.0 - 9.0    Protein,  (A)

## 2021-08-04 NOTE — ED NOTES
Bed: 07  Expected date: 8/4/21  Expected time:   Means of arrival: [facility]  Comments:  Heather 66 Hypoglycemia     [facility]  08/04/21 6360

## 2021-08-04 NOTE — ED NOTES
Dr Arianna Serrano advised on bp states to continue home meds and ok for pt to be discharged     Rosendo Victor RN  08/04/21 4052

## 2021-09-17 NOTE — ED NOTES
Results of testing explained to patient. Patient verbalizes understanding of instructions regarding testing and need to follow up with PCP and/or specialist provider. Transport arranged with assisted living's own transport crew.       Helga Lopez RN  09/17/21 3422

## 2021-09-17 NOTE — ED NOTES
Established contact with patient, laying comfortably in bed. Patient alert and oriented asking about what time breakfast will be. Will continue to monitor.       German Osorio RN  09/17/21 7775

## 2021-09-17 NOTE — ED NOTES
Patient alert and oriented times 4, no lethargy at present, given PO breakfast and patient readily eating.      Almaz Vazquez RN  09/17/21 5280

## 2021-09-17 NOTE — ED PROVIDER NOTES
[address] ENCOUNTER      Pt Name: Meena Castellanos  MRN: [id number]  [place] 7/4/1931  Date of evaluation: 9/17/2021      CHIEF COMPLAINT       Chief Complaint   Patient presents with    Hypoglycemia     originally 39. .. Had a fall earlier per nursing home         HPI  Meena Castellanos is a 80 y.o. male with a past medical history of type 2 diabetes and Parkinson's disease, who presents to the ED via EMS from an assisted living home with complaint of hypoglycemia and a fall. Patient recently had a blood glucose level of 41 and was given D10 at the nursing facility. Upon arrival patient was oriented x3 and had a blood glucose level of 103. The patient states he got up to got to the bathroom this morning and immediately felt dizzy causing him to fall. He does not remember the fall, but states he was unable to get up on his own due to weakness. He admits to multiple previous falls in the past.  He does not remember if he hit his head. Patient is ambulatory with a walker at baseline. Patient admits to right hip pain. He admits to constipation and generalized weakness. He denies fever, chills, shortness of breath, dysuria, chest pain, or abdominal pain. Except as noted above the remainder of the review of systems was reviewed and negative. Review of Systems   Constitutional: Negative for chills and fever. HENT: Negative for ear pain and sore throat. Eyes: Negative for pain. Respiratory: Negative for shortness of breath. Cardiovascular: Negative for chest pain. Gastrointestinal: Positive for constipation. Negative for abdominal pain, diarrhea, nausea and vomiting. Genitourinary: Negative for flank pain. Musculoskeletal: Negative for back pain and neck pain. Skin: Negative for rash. Neurological: Positive for dizziness and weakness. Negative for headaches.    Psychiatric/Behavioral: Negative for behavioral problems. The patient is not nervous/anxious. Physical Exam  Constitutional:       General: He is not in acute distress. Appearance: Normal appearance. He is not diaphoretic. HENT:      Head: Normocephalic and atraumatic. Right Ear: External ear normal.      Left Ear: External ear normal.      Nose: Nose normal.      Mouth/Throat:      Mouth: Mucous membranes are moist.      Pharynx: No oropharyngeal exudate. Eyes:      Conjunctiva/sclera: Conjunctivae normal.      Pupils: Pupils are equal, round, and reactive to light. Comments: Bilateral eyes unable to look left   Cardiovascular:      Rate and Rhythm: Normal rate and regular rhythm. Pulses: Normal pulses. Heart sounds: Normal heart sounds. Pulmonary:      Effort: Pulmonary effort is normal. No respiratory distress. Breath sounds: Normal breath sounds. Abdominal:      Tenderness: There is no abdominal tenderness. There is no guarding or rebound. Musculoskeletal:         General: No deformity or signs of injury. Cervical back: Normal range of motion and neck supple. Right lower leg: Edema present. Left lower leg: Edema present. Comments: 5/5 muscle strength of bilateral upper extremities and  strength, flexion, and extension at the elbow. 5/5 muscle strength of bilateral lower extremities and hip flexion and dorsi flexion of the feet   Skin:     General: Skin is warm and dry. Capillary Refill: Capillary refill takes less than 2 seconds. Neurological:      General: No focal deficit present. Mental Status: He is alert and oriented to person, place, and time. Mental status is at baseline. Comments: Resting tremor in his right upper extremity   Psychiatric:         Mood and Affect: Mood normal.          Procedures     MDM   Patient is a 80-year-old male who presents with hypoglycemia and a fall. Upon arrival patient was oriented x3 and found to have a glucose of 103.   He appeared in no acute distress and only complained of weakness. Due to him not remembering the mechanism of his fall we ordered a head and neck CT, basic labs, EKG, and chest x-ray. His blood work and urine came back negative. While in the ED patient had hyper and hypoglycemic episodes. The patient was fed and found to have a sugar of 125. Patient's sugar remained stable after D50 and food. Per Dr. Awa Ambrosio recommendations patient to be discharged back to his assisted living facility with instructions to decrease his Lantus by 10 units. Patient is awake alert, hemodynamic stable, afebrile and in no respiratory distress. Discussed with patient plan for close outpatient follow-up with the patient's PCP as well as return precautions and the patient understands and agrees to the plan. ED Course as of Sep 17 1244   Fri Sep 17, 2021   8640 Repeat BGL was found to be low. Patient was less responsive. An amp of D50 was given. [TO]   [id number] Spoke with Dr. Karon Gomez who advised me to continue D10 drip until his glucose stabilized. He states the falls are patient's baseline due to his Parkinson disease. Advised me to let the nursing home know they should drop his Lantus by 10 units. [TO]   1149 Patient reevaluated, he appears oriented x3 and does not have any complaints. His most recent point-of-care glucose is 147. We will wait for 2 more hours to see if his glucose remained stable. [TO]      ED Course User Index  [TO] Eusebio Kevin, DO             EKG:  This EKG is signed and interpreted by me.     Rate: 64  Rhythm: Sinus  Interpretation: Left axis deviation, , no ST segment elevations  Comparison: stable as compared to patient's most recent EKG  --------------------------------------------- PAST HISTORY ---------------------------------------------  Past Medical History:  has a past medical history of Acute on chronic diastolic (congestive) heart failure ([address] Ambulatory dysfunction, Benign prostate hyperplasia, Blood transfusion, CAD (coronary artery disease), Cataract, CHF (congestive heart failure) ([facility] [address] Chronic idiopathic constipation, Diabetes mellitus ([facility] [address] Dysphagia, History of carotid endarterectomy, History of left common carotid artery stent placement, Hyperlipidemia, Hypertension, Parkinson's disease ([facility] [address] and Spinal stenosis, lumbar. Past Surgical History:  has a past surgical history that includes back surgery; Carotid endarterectomy (rt and left stent in rt); Tonsillectomy; Colonoscopy; Upper gastrointestinal endoscopy; Cardiac surgery (2000); Endoscopy, colon, diagnostic; AAA repair, endovascular (1 year ago); Cataract removal with implant (Right, 04/08/14); and Cataract removal with implant (Left, 9 23 14). Social History:  reports that he has quit smoking. He has never used smokeless tobacco. He reports that he does not drink alcohol and does not use drugs. Family History: family history is not on file. The patients home medications have been reviewed. Allergies: Patient has no known allergies.     -------------------------------------------------- RESULTS -------------------------------------------------  Labs:  Results for orders placed or performed during the hospital encounter of 09/17/21   CBC Auto Differential   Result Value Ref Range    WBC 8.5 4.5 - 11.5 E9/L    RBC 3.02 (L) 3.80 - 5.80 E12/L    Hemoglobin 10.5 (L) 12.5 - 16.5 g/dL    Hematocrit 32.2 (L) 37.0 - 54.0 %    .6 (H) 80.0 - 99.9 fL    MCH 34.8 26.0 - 35.0 pg    MCHC 32.6 32.0 - 34.5 %    RDW 13.8 11.5 - 15.0 fL    Platelets 775 149 - 102 E9/L    MPV 10.8 7.0 - 12.0 fL    Neutrophils % 82.6 (H) 43.0 - 80.0 %    Lymphocytes % 12.2 (L) 20.0 - 42.0 %    Monocytes % 5.2 2.0 - 12.0 %    Eosinophils % 0.2 0.0 - 6.0 %    Basophils % 0.2 0.0 - 2.0 %    Neutrophils Absolute 7.06 1.80 - 7.30 E9/L    Lymphocytes Absolute 1.02 (L) 1.50 - 4.00 E9/L    Monocytes Absolute 0.42 0.10 - 0.95 E9/L    Eosinophils Absolute 0.00 (L) 0.05 - 0.50 E9/L    Basophils Absolute 0.00 0.00 - 0.20 E9/L   Comprehensive Metabolic Panel w/ Reflex to MG   Result Value Ref Range    Sodium 139 132 - 146 mmol/L    Potassium reflex Magnesium 4.1 3.5 - 5.0 mmol/L    Chloride 108 (H) 98 - 107 mmol/L    CO2 25 22 - 29 mmol/L    Anion Gap 6 (L) 7 - 16 mmol/L    Glucose 59 (L) 74 - 99 mg/dL    BUN 29 (H) 6 - 23 mg/dL    CREATININE 1.1 0.7 - 1.2 mg/dL    GFR Non-African American >60 >=60 mL/min/1.73    GFR African American >60     Calcium 8.1 (L) 8.6 - 10.2 mg/dL    Total Protein 6.0 (L) 6.4 - 8.3 g/dL    Albumin 3.4 (L) 3.5 - 5.2 g/dL    Total Bilirubin 0.6 0.0 - 1.2 mg/dL    Alkaline Phosphatase 91 40 - 129 U/L    ALT 19 0 - 40 U/L    AST 21 0 - 39 U/L   Troponin   Result Value Ref Range    Troponin, High Sensitivity 25 (H) 0 - 11 ng/L   Urinalysis, reflex to microscopic   Result Value Ref Range    Color, UA Yellow Straw/Yellow    Clarity, UA Clear Clear    Glucose, Ur Negative Negative mg/dL    Bilirubin Urine Negative Negative    Ketones, Urine Negative Negative mg/dL    Specific Gravity, UA >=1.030 1.005 - 1.030    Blood, Urine MODERATE (A) Negative    pH, UA 5.0 5.0 - 9.0    Protein,  (A) Negative mg/dL    Urobilinogen, Urine 2.0 (A) <2.0 E.U./dL    Nitrite, Urine Negative Negative    Leukocyte Esterase, Urine Negative Negative   Microscopic Urinalysis   Result Value Ref Range    WBC, UA 0-1 0 - 5 /HPF    RBC, UA 1-3 0 - 2 /HPF    Epithelial Cells, UA FEW /HPF    Bacteria, UA RARE (A) None Seen /HPF   Troponin   Result Value Ref Range    Troponin, High Sensitivity 24 (H) 0 - 11 ng/L   POCT Glucose   Result Value Ref Range    Meter Glucose 103 (H) 74 - 99 mg/dL   POCT Glucose   Result Value Ref Range    Meter Glucose <40 (L) 74 - 99 mg/dL   POCT Glucose   Result Value Ref Range    Meter Glucose 95 74 - 99 mg/dL   POCT Glucose   Result Value Ref Range    Meter Glucose 56 (L) 74 - 99 mg/dL   POCT Glucose   Result Value Ref Range    Meter Glucose 62 (L) 74 - 99 mg/dL   POCT Glucose   Result Value Ref Range    Meter Glucose 125 (H) 74 - 99 mg/dL   POCT Glucose   Result Value Ref Range    Meter Glucose 147 (H) 74 - 99 mg/dL   POCT Glucose   Result Value Ref Range    Meter Glucose 178 (H) 74 - 99 mg/dL       Radiology:  XR HIP RIGHT (2-3 VIEWS)   Final Result   No acute fracture is identified. CT Head WO Contrast   Final Result   No acute intracranial abnormality. However there is parenchymal volume loss likely age related. Periventricular   white matter lucency is nonspecific but likely represents chronic   microvascular ischemic change. If there is continued concern for evolving infarct or there is persistent   neurological change consider follow-up CT or further evaluation with MRI. CT CERVICAL SPINE WO CONTRAST   Final Result   1. There is no acute compression fracture or subluxation of the cervical   spine. 2. Multilevel degenerative disc and degenerative joint disease. XR CHEST PORTABLE   Final Result   Borderline cardiomegaly with pulmonary vascular congestion.             ------------------------- NURSING NOTES AND VITALS REVIEWED ---------------------------  Date / Time Roomed:  9/17/2021  5:59 AM  ED Bed Assignment:  07/07    The nursing notes within the ED encounter and vital signs as below have been reviewed. BP (!) 175/67   Pulse 67   Temp 98.5 °F (36.9 °C) (Oral)   Resp 20   SpO2 93%   Oxygen Saturation Interpretation: normal      ------------------------------------------ PROGRESS NOTES ------------------------------------------    I have spoken with the patient and discussed todays results, in addition to providing specific details for the plan of care and counseling regarding the diagnosis and prognosis. Their questions are answered at this time and they are agreeable with the plan. I discussed at length with them reasons for immediate return here for re evaluation.  They will followup with their PCP by calling their office tomorrow. --------------------------------- ADDITIONAL PROVIDER NOTES ---------------------------------  At this time the patient is without objective evidence of an acute process requiring hospitalization or inpatient management. They have remained hemodynamically stable throughout their entire ED visit and are stable for discharge with outpatient follow-up. The plan has been discussed in detail and they are aware of the specific conditions for emergent return, as well as the importance of follow-up. New Prescriptions    No medications on file       Diagnosis:  1. Hypoglycemia    2. Fall, initial encounter    3. Generalized weakness    4. Pulmonary vascular congestion        Disposition:  Patient's disposition: Discharge to home  Patient's condition is stable.       Syed Peterson DO  Resident  09/17/21 2057

## 2021-09-23 NOTE — ED NOTES
Bed: 01  Expected date:   Expected time:   Means of arrival:   Comments:  geoff Stahl, ROBB  09/23/21 2045

## 2021-09-23 NOTE — CARE COORDINATION
SS NOTE: Pt is from [facility] and can return there now. SW made contact with the [facility]- she will set up pt's transportation to return now. KELLY Prajapati.9/23/2021.1:40 PM.

## 2021-09-23 NOTE — ED PROVIDER NOTES
him being discharged home. Patient would prefer to be discharged and is agreeable to this plan. Patient understands if he has worsening symptoms or new concerns that he can return to the ED for further evaluation. [MS]      ED Course User Index  [MS] Obdulio Hargrove DO       --------------------------------------------- PAST HISTORY ---------------------------------------------  Past Medical History:  has a past medical history of Acute on chronic diastolic (congestive) heart failure ([address] Ambulatory dysfunction, Benign prostate hyperplasia, Blood transfusion, CAD (coronary artery disease), Cataract, CHF (congestive heart failure) ([address] Chronic idiopathic constipation, Diabetes mellitus ([facility] [address] Dysphagia, History of carotid endarterectomy, History of left common carotid artery stent placement, Hyperlipidemia, Hypertension, Parkinson's disease ([address] and Spinal stenosis, lumbar. Past Surgical History:  has a past surgical history that includes back surgery; Carotid endarterectomy (rt and left stent in rt); Tonsillectomy; Colonoscopy; Upper gastrointestinal endoscopy; Cardiac surgery (2000); Endoscopy, colon, diagnostic; AAA repair, endovascular (1 year ago); Cataract removal with implant (Right, 04/08/14); and Cataract removal with implant (Left, 9 23 14). Social History:  reports that he has quit smoking. He has never used smokeless tobacco. He reports that he does not drink alcohol and does not use drugs. Family History: family history is not on file. The patients home medications have been reviewed. Allergies: Patient has no known allergies.     -------------------------------------------------- RESULTS -------------------------------------------------  Labs:  Results for orders placed or performed during the hospital encounter of 09/23/21   CBC Auto Differential   Result Value Ref Range    WBC 6.4 4.5 - 11.5 E9/L    RBC 3.02 (L) 3.80 - 5.80 E12/L    Hemoglobin 10.4 (L) 12.5 - 16.5 g/dL Hematocrit 32.4 (L) 37.0 - 54.0 %    .3 (H) 80.0 - 99.9 fL    MCH 34.4 26.0 - 35.0 pg    MCHC 32.1 32.0 - 34.5 %    RDW 14.0 11.5 - 15.0 fL    Platelets 160 592 - 920 E9/L    MPV 10.8 7.0 - 12.0 fL    Neutrophils % 73.0 43.0 - 80.0 %    Lymphocytes % 15.7 (L) 20.0 - 42.0 %    Monocytes % 9.6 2.0 - 12.0 %    Eosinophils % 0.9 0.0 - 6.0 %    Basophils % 0.9 0.0 - 2.0 %    Neutrophils Absolute 4.67 1.80 - 7.30 E9/L    Lymphocytes Absolute 1.02 (L) 1.50 - 4.00 E9/L    Monocytes Absolute 0.64 0.10 - 0.95 E9/L    Eosinophils Absolute 0.06 0.05 - 0.50 E9/L    Basophils Absolute 0.06 0.00 - 0.20 E9/L    Poikilocytes 1+     Sejal Cells 1+    Basic Metabolic Panel w/ Reflex to MG   Result Value Ref Range    Sodium 138 132 - 146 mmol/L    Potassium reflex Magnesium 5.0 3.5 - 5.0 mmol/L    Chloride 103 98 - 107 mmol/L    CO2 27 22 - 29 mmol/L    Anion Gap 8 7 - 16 mmol/L    Glucose 205 (H) 74 - 99 mg/dL    BUN 27 (H) 6 - 23 mg/dL    CREATININE 1.2 0.7 - 1.2 mg/dL    GFR Non-African American 57 >=60 mL/min/1.73    GFR African American >60     Calcium 8.3 (L) 8.6 - 10.2 mg/dL   Troponin   Result Value Ref Range    Troponin, High Sensitivity 25 (H) 0 - 11 ng/L   APTT   Result Value Ref Range    aPTT 29.9 24.5 - 35.1 sec   EKG 12 Lead   Result Value Ref Range    Ventricular Rate 61 BPM    Atrial Rate 267 BPM    QRS Duration 92 ms    Q-T Interval 452 ms    QTc Calculation (Bazett) 455 ms    R Axis -36 degrees    T Axis 27 degrees       Radiology:  CT HEAD WO CONTRAST   Final Result   No acute CVA, intracranial bleed, or brain mass. White matter hypodensities again favor chronic small vessel ischemic gliosis. Stable global brain atrophy. XR CHEST PORTABLE   Final Result   1.  Mild pulmonary vascular congestion.             ------------------------- NURSING NOTES AND VITALS REVIEWED ---------------------------  Date / Time Roomed:  9/23/2021 11:02 AM  ED Bed Assignment:  01/01    The nursing notes within the ED encounter and vital signs as below have been reviewed. BP (!) 157/63   Pulse 60   Temp 98.6 °F (37 °C) (Oral)   Resp 16   SpO2 93%   Oxygen Saturation Interpretation: Normal      ------------------------------------------ PROGRESS NOTES ------------------------------------------  I have spoken with the patient and discussed todays results, in addition to providing specific details for the plan of care and counseling regarding the diagnosis and prognosis. Their questions are answered at this time and they are agreeable with the plan. I discussed at length with them reasons for immediate return here for re evaluation. They will followup with primary care by calling their office tomorrow. --------------------------------- ADDITIONAL PROVIDER NOTES ---------------------------------  At this time the patient is without objective evidence of an acute process requiring hospitalization or inpatient management. They have remained hemodynamically stable throughout their entire ED visit and are stable for discharge with outpatient follow-up. The plan has been discussed in detail and they are aware of the specific conditions for emergent return, as well as the importance of follow-up. New Prescriptions    No medications on file       Diagnosis:  1. TIA (transient ischemic attack)        Disposition:  Patient's disposition: Discharge to nursing home  Patient's condition is stable.          Henry Curran,   09/23/21 7819

## 2021-09-28 NOTE — ED PROVIDER NOTES
(congestive) heart failure ([address] Ambulatory dysfunction, Benign prostate hyperplasia, Blood transfusion, CAD (coronary artery disease), Cataract, CHF (congestive heart failure) ([address] Chronic idiopathic constipation, Diabetes mellitus ([address] Dysphagia, History of carotid endarterectomy, History of left common carotid artery stent placement, Hyperlipidemia, Hypertension, Parkinson's disease ([address] and Spinal stenosis, lumbar. Past Surgical History:  has a past surgical history that includes back surgery; Carotid endarterectomy (rt and left stent in rt); Tonsillectomy; Colonoscopy; Upper gastrointestinal endoscopy; Cardiac surgery (2000); Endoscopy, colon, diagnostic; AAA repair, endovascular (1 year ago); Cataract removal with implant (Right, 04/08/14); and Cataract removal with implant (Left, 9 23 14). Social History:  reports that he has quit smoking. He has never used smokeless tobacco. He reports that he does not drink alcohol and does not use drugs. Family History: family history is not on file. The patients home medications have been reviewed. Allergies: Patient has no known allergies. --------------------------------- RESULTS ------------------------------------------  All laboratory and radiology results have been personally reviewed by myself   LABS:  No results found for this visit on 09/27/21. RADIOLOGY:  Interpreted by Radiologist.  [address] [place] [address]   Final Result   No acute intracranial findings or significant change. Consider MRI if   symptoms persist.      Volume loss and findings suggestive of chronic microvascular ischemia.             ----------------- NURSING NOTES AND VITALS REVIEWED ---------------   The nursing notes within the ED encounter and vital signs as below have been reviewed.    /67   Pulse 70   Temp 98 °F (36.7 °C) (Oral)   Resp 20   Ht 5' 7\" (1.702 m)   Wt 170 lb (77.1 kg)   SpO2 92%   BMI 26.63 kg/m²   Oxygen Saturation Interpretation: Normal      --------------------------------PHYSICAL EXAM------------------------------------      Constitutional/General: Alert and oriented x3, well appearing, non toxic in NAD  Head: NC/AT. No visible trauma noted over face/head  Eyes: PERRL, EOMI  Mouth: Oropharynx clear, handling secretions, no trismus  Neck: Supple, full ROM, no meningeal signs. No bony midline tenderness on palpation. Pulmonary: Lungs clear to auscultation bilaterally, no wheezes, rales, or rhonchi. Not in respiratory distress  Cardiovascular:  Regular rate and rhythm, no murmurs, gallops, or rubs. 2+ distal pulses  Abdomen: Soft, + BS. No distension. Nontender. No palpable rigidity, rebound or guarding  Extremities: Moves all extremities x 4. Pt has diffuse joint stiffness and cogwheeling on exam.   No bony tenderness or deformity. Small skin tear noted on dorsal surface left forearm. Mild bleeding. Warm and well perfused  Skin: warm and dry without rash  Neurologic: GCS 15,  Intact. No focal deficits  Psych: Normal Affect      ------------------------ ED COURSE/MEDICAL DECISION MAKING----------------------  Medications - No data to display      Medical Decision Making:    Skin tear has been cleaned and dressed. Tolerated well. CT head is normal.   No other complaints and no other apparent injuries. OK to return to SNF. F/U as needed. Counseling: The emergency provider has spoken with the patient and discussed todays results, in addition to providing specific details for the plan of care and counseling regarding the diagnosis and prognosis. Questions are answered at this time and they are agreeable with the plan.      ------------------------ IMPRESSION AND DISPOSITION -------------------------------    IMPRESSION  1. Fall, initial encounter    2.  Skin tear of left forearm without complication, initial encounter        DISPOSITION  Disposition: Discharge to home  Patient condition is stable Misa Bonds PA-C  09/28/21 0104       Misa Bonds PA-C  09/28/21 0114      ATTENDING PROVIDER ATTESTATION:     Supervising Physician, on-site, available for consultation, non-participatory in the evaluation or care of this patient.          Taiwo Solano DO  10/25/21 4216

## 2021-10-18 NOTE — ED PROVIDER NOTES
[facility]  Department of Emergency Medicine     Written by: Barbie Ingram DO  Patient Name: Laura Eye  Attending Provider: Simpson Rinne, DO  Admit Date: 10/18/2021  3:34 PM  MRN: [id number]                   : 1931        Chief Complaint   Patient presents with    Altered Mental Status     x1 day. A&O x4 at this time. Hx of parkinsons.     - Chief complaint    Mr. Eliezer Cao is a 81 yo male with a PMHx of parkinson's disease, CHF, CAD, DM2 and HTN who presents to the ED due to altered mental status. Per EMS patient was found in bed at his nursing home and was unable to get out of bed. This is different than his normal status where he is up and walking around his nursing home. Patient states he felt weaker than his baseline. Patient is A&O x4 in the department. He does state that he fell a few days ago onto a card table and his left side hurts. States this is aggravated with any sort of movement and palpation of the area and only mildly relieved with rest.  States the pain has been about the same since onset. Patient states he has some minor shortness of breath as well. He was saturating 90% on room air and placed on 2 L oxygen via nasal cannula patient denies any fever, chills, nausea, vomiting, chest pain, bowel or urinary changes, headaches, vision changes, lightheadedness or dizziness. Review of Systems   Constitutional: Positive for fatigue. Negative for chills and fever. HENT: Negative for congestion, rhinorrhea, sinus pressure, sinus pain, sneezing, sore throat and trouble swallowing. Eyes: Negative for pain, redness and visual disturbance. Respiratory: Positive for chest tightness and shortness of breath. Negative for cough. Cardiovascular: Negative for chest pain, palpitations and leg swelling. Gastrointestinal: Positive for abdominal pain (Left sided). Negative for constipation, diarrhea, nausea and vomiting.    Genitourinary: Negative for dysuria, flank pain, frequency, hematuria and urgency. Musculoskeletal: Negative for joint swelling. Skin: Negative for rash. Neurological: Positive for weakness. Negative for dizziness, tremors, seizures, syncope, light-headedness, numbness and headaches. Physical Exam  Constitutional:       General: He is not in acute distress. Appearance: Normal appearance. He is well-developed and normal weight. HENT:      Head: Normocephalic and atraumatic. Right Ear: External ear normal.      Left Ear: External ear normal.      Mouth/Throat:      Mouth: Mucous membranes are moist.      Pharynx: Oropharynx is clear. Eyes:      Extraocular Movements: Extraocular movements intact. Conjunctiva/sclera: Conjunctivae normal.   Cardiovascular:      Rate and Rhythm: Normal rate and regular rhythm. Pulses: Normal pulses. Heart sounds: Normal heart sounds. Pulmonary:      Effort: Pulmonary effort is normal. No respiratory distress. Breath sounds: Normal breath sounds. Abdominal:      General: Abdomen is flat. Bowel sounds are normal. There is no distension. Palpations: Abdomen is soft. Tenderness: There is abdominal tenderness (LUQ and LLQ). There is no guarding. Musculoskeletal:         General: No tenderness. Normal range of motion. Cervical back: Normal range of motion and neck supple. Skin:     General: Skin is warm and dry. Neurological:      General: No focal deficit present. Mental Status: He is oriented to person, place, and time. Mental status is at baseline. He is lethargic. Sensory: No sensory deficit. Motor: Weakness (Generalized) present. Coordination: Romberg sign negative.    Psychiatric:         Mood and Affect: Mood normal.         Behavior: Behavior normal.          Procedures       MDM  Number of Diagnoses or Management Options  Altered mental status, unspecified altered mental status type  Constipation, unspecified constipation type  Parkinson disease [facility])  Diagnosis management comments: This is a 81 yo male with a PMHx of parkinson's disease, CHF, CAD, DM2 and HTN who presents to the ED due to altered mental status. His EKG revealed normal sinus rhythm with a rate of 72 and left axis deviation but any acute interval or ST segment changes. CBC was in line with prior studies and markedly benign. Patient's BMP revealed mildly elevated creatinine at 1.3 with otherwise normal values. Hepatic function panel was within normal limits. Lipase was normal.  Patient BNP was 769 which appears to be within his normal values. Lactate was normal.  Urinalysis was clean. Ammonia was also normal.  Delta troponin was 0 and thus negative. CT head revealed no acute intracranial abnormality with periventricular white matter changes consistent with chronic microvascular disease and diffuse volume loss. CT C-spine revealed no acute abnormality of the cervical spine with multilevel degenerative changes. CT abdomen pelvis revealed colonic fecal retention, hiatal hernia, prostatomegaly with diffuse urinary bladder wall thickening and a 6 mm nonobstructing calculus in the inferior pole of the right kidney without hydronephrosis. Chest x-ray revealed no acute cardiopulmonary processes. His vitals were stable throughout his stay and he remained A&O x4 in the department. Patient will be discharged home at this time and told to follow-up with his PCP regarding his symptoms. Patient told to return to the ED if his symptoms return, worsen or change at any time.                   --------------------------------------------- PAST HISTORY ---------------------------------------------  Past Medical History:  has a past medical history of Acute on chronic diastolic (congestive) heart failure ([address] Ambulatory dysfunction, Benign prostate hyperplasia, Blood transfusion, CAD (coronary artery disease), Cataract, CHF (congestive heart failure) ([address] Chronic idiopathic constipation, Diabetes mellitus ([facility] Dysphagia, History of carotid endarterectomy, History of left common carotid artery stent placement, Hyperlipidemia, Hypertension, Parkinson's disease ([address] and Spinal stenosis, lumbar. Past Surgical History:  has a past surgical history that includes back surgery; Carotid endarterectomy (rt and left stent in rt); Tonsillectomy; Colonoscopy; Upper gastrointestinal endoscopy; Cardiac surgery (2000); Endoscopy, colon, diagnostic; AAA repair, endovascular (1 year ago); Cataract removal with implant (Right, 04/08/14); and Cataract removal with implant (Left, 9 23 14). Social History:  reports that he has quit smoking. He has never used smokeless tobacco. He reports that he does not drink alcohol and does not use drugs. Family History: family history is not on file. The patients home medications have been reviewed. Allergies: Patient has no known allergies. -------------------------------------------------- RESULTS -------------------------------------------------  EKG: This EKG is signed and interpreted by me.     Rate: 72  Rhythm: Sinus  Interpretation: no acute changes  Comparison: was normal    Labs:  Results for orders placed or performed during the hospital encounter of 10/18/21   CBC Auto Differential   Result Value Ref Range    WBC 6.2 4.5 - 11.5 E9/L    RBC 2.90 (L) 3.80 - 5.80 E12/L    Hemoglobin 9.9 (L) 12.5 - 16.5 g/dL    Hematocrit 31.4 (L) 37.0 - 54.0 %    .3 (H) 80.0 - 99.9 fL    MCH 34.1 26.0 - 35.0 pg    MCHC 31.5 (L) 32.0 - 34.5 %    RDW 14.0 11.5 - 15.0 fL    Platelets 650 778 - 592 E9/L    MPV 11.0 7.0 - 12.0 fL    Neutrophils % 70.0 43.0 - 80.0 %    Lymphocytes % 23.0 20.0 - 42.0 %    Monocytes % 5.0 2.0 - 12.0 %    Eosinophils % 1.0 0.0 - 6.0 %    Basophils % 1.0 0.0 - 2.0 %    Neutrophils Absolute 4.34 1.80 - 7.30 E9/L    Lymphocytes Absolute 1.43 (L) 1.50 - 4.00 E9/L    Monocytes Absolute 0.31 0.10 - 0.95 E9/L Eosinophils Absolute 0.06 0.05 - 0.50 E9/L    Basophils Absolute 0.06 0.00 - 0.20 E9/L    Polychromasia 1+     Hypochromia 1+     Basophilic Stippling 1+    Basic Metabolic Panel w/ Reflex to MG   Result Value Ref Range    Sodium 137 132 - 146 mmol/L    Potassium reflex Magnesium 4.7 3.5 - 5.0 mmol/L    Chloride 104 98 - 107 mmol/L    CO2 28 22 - 29 mmol/L    Anion Gap 5 (L) 7 - 16 mmol/L    Glucose 132 (H) 74 - 99 mg/dL    BUN 25 (H) 6 - 23 mg/dL    CREATININE 1.3 (H) 0.7 - 1.2 mg/dL    GFR Non-African American 52 >=60 mL/min/1.73    GFR African American >60     Calcium 8.4 (L) 8.6 - 10.2 mg/dL   Hepatic Function Panel   Result Value Ref Range    Total Protein 6.3 (L) 6.4 - 8.3 g/dL    Albumin 3.6 3.5 - 5.2 g/dL    Alkaline Phosphatase 100 40 - 129 U/L    ALT 17 0 - 40 U/L    AST 20 0 - 39 U/L    Total Bilirubin 0.8 0.0 - 1.2 mg/dL    Bilirubin, Direct 0.3 0.0 - 0.3 mg/dL    Bilirubin, Indirect 0.5 0.0 - 1.0 mg/dL   Lipase   Result Value Ref Range    Lipase 47 13 - 60 U/L   Brain Natriuretic Peptide   Result Value Ref Range    Pro- (H) 0 - 450 pg/mL   Troponin   Result Value Ref Range    Troponin, High Sensitivity 26 (H) 0 - 11 ng/L   Lactic Acid, Plasma   Result Value Ref Range    Lactic Acid 1.3 0.5 - 2.2 mmol/L   Urinalysis, reflex to microscopic   Result Value Ref Range    Color, UA Yellow Straw/Yellow    Clarity, UA Clear Clear    Glucose, Ur Negative Negative mg/dL    Bilirubin Urine Negative Negative    Ketones, Urine Negative Negative mg/dL    Specific Gravity, UA 1.010 1.005 - 1.030    Blood, Urine Negative Negative    pH, UA 6.0 5.0 - 9.0    Protein,  (A) Negative mg/dL    Urobilinogen, Urine 2.0 (A) <2.0 E.U./dL    Nitrite, Urine Negative Negative    Leukocyte Esterase, Urine Negative Negative   Ammonia   Result Value Ref Range    Ammonia 16.0 16.0 - 60.0 umol/L   Microscopic Urinalysis   Result Value Ref Range    WBC, UA NONE 0 - 5 /HPF    RBC, UA NONE 0 - 2 /HPF    Bacteria, UA NONE SEEN None Seen /HPF   Troponin   Result Value Ref Range    Troponin, High Sensitivity 26 (H) 0 - 11 ng/L   POCT Glucose   Result Value Ref Range    Glucose 132 mg/dL    QC OK? pass        Radiology:  CT Head WO Contrast   Final Result   No acute intracranial abnormality. Periventricular white matter changes consistent chronic microvascular   disease. Diffuse volume loss. CT Cervical Spine WO Contrast   Final Result   No acute abnormality of the cervical spine. Multilevel degenerative changes. CT ABDOMEN PELVIS W IV CONTRAST Additional Contrast? None   Final Result   Colonic fecal retention, otherwise no definitive findings to explain the   patient's symptoms. Hiatal hernia. Prostatomegaly with diffuse urinary bladder wall thickening. 6 mm nonobstructing calculus inferior pole right kidney. No hydronephrosis. XR CHEST PORTABLE   Final Result   No acute cardiopulmonary process. ------------------------- NURSING NOTES AND VITALS REVIEWED ---------------------------  Date / Time Roomed:  10/18/2021  3:34 PM  ED Bed Assignment:  13/13    The nursing notes within the ED encounter and vital signs as below have been reviewed. BP (!) 164/72   Pulse 71   Temp 98.1 °F (36.7 °C)   Resp 22   SpO2 92%   Oxygen Saturation Interpretation: Improved after treatment      ------------------------------------------ PROGRESS NOTES ------------------------------------------  8:55 PM EDT  I have spoken with the patient and discussed todays results, in addition to providing specific details for the plan of care and counseling regarding the diagnosis and prognosis. Their questions are answered at this time and they are agreeable with the plan. I discussed at length with them reasons for immediate return here for re evaluation. They will followup with their primary care physician by calling their office tomorrow.       --------------------------------- ADDITIONAL PROVIDER NOTES ---------------------------------  At this time the patient is without objective evidence of an acute process requiring hospitalization or inpatient management. They have remained hemodynamically stable throughout their entire ED visit and are stable for discharge with outpatient follow-up. The plan has been discussed in detail and they are aware of the specific conditions for emergent return, as well as the importance of follow-up. New Prescriptions    No medications on file       Diagnosis:  1. Altered mental status, unspecified altered mental status type    2. Parkinson disease ([facility]    3. Constipation, unspecified constipation type        Disposition:  Patient's disposition: Discharge to home  Patient's condition is stable. Patient was seen and evaluated by myself and my attending Audrey Driver DO. Assessment and Plan discussed with attending provider, please see attestation for final plan of care. DO Lorin Ovalle DO  Resident  10/18/21 [id number]      ATTENDING PROVIDER ATTESTATION:     Lalit Esposito presented to the emergency department for evaluation of Altered Mental Status (x1 day. A&O x4 at this time. Hx of parkinsons. )    I have reviewed and discussed the case, including pertinent history (medical, surgical, family and social) and exam findings with the Resident and the Nurse assigned to Lalit Esposito. I have personally performed and/or participated in the history, exam, medical decision making, and procedures and agree with all pertinent clinical information. I have reviewed my findings and recommendations with [place] Meghans and members of family present at the time of disposition. I, Dr. Ra Gutierrez am the primary physician of record for this patient.     MDM: The patient is 80 y.o. male  with a past medical history of       Diagnosis Date    Acute on chronic diastolic (congestive) heart failure ([facility]     Ambulatory dysfunction 4/30/2019    Hx severe lumbar psinal stenosis, severe at L4/5 level-MR lumbar spine, 2018    Benign prostate hyperplasia     Blood transfusion     CAD (coronary artery disease)     Cataract     CHF (congestive heart failure) (HCC)     Chronic idiopathic constipation     Diabetes mellitus (HCC)     Dysphagia     History of carotid endarterectomy 4/30/2019    Right CEA    History of left common carotid artery stent placement 4/30/2019    Hyperlipidemia     Hypertension     Parkinson's disease ([address]     Spinal stenosis, lumbar      presenting to the emergency department with a chief complaint of altered mental status. Differential diagnosis limits but not limited to intracranial hemorrhage, electrolyte derangement, sepsis, UTI. The patient did have labs and imaging which were reviewed. CBC, CMP, high-sensitivity troponin, CT head unremarkable for any acute process. The patient will be discharged as he is at his baseline. He will follow-up outpatient. My findings/plan: The primary encounter diagnosis was Altered mental status, unspecified altered mental status type. Diagnoses of Parkinson disease ([address] and Constipation, unspecified constipation type were also pertinent to this visit.   Discharge Medication List as of 10/19/2021 12:26 AM        Holly Alcocer, [address], DO  10/21/21 1126

## 2021-11-11 PROBLEM — S72.142A CLOSED FRACTURE OF FEMUR, INTERTROCHANTERIC, LEFT, INITIAL ENCOUNTER (HCC): Status: ACTIVE | Noted: 2021-01-01

## 2021-11-11 NOTE — LETTER
[place] Department Medicaid  CERTIFICATION OF NECESSITY  FOR NON-EMERGENCY TRANSPORTATION   BY [facility]      Individual Information   1. Name: Bernard Kennedy 2. [place] Medicaid Billing Number:    3. Address: [address] [place] [address]  [id number]  [place] [address]      Transportation Provider Information   4. Provider Name: [facility]   5. [place] Medicaid Provider Number:  National Provider Identifier (NPI):      Certification  7. Criteria:  During transport, this individual requires:  [x] Medical treatment or continuous     supervision by an EMT. [] The administration or regulation of oxygen by another person. [] Supervised protective restraint. 8. Period Beginning Date: 11/15/2021   9. Length  [x] Not more than 1 day(s)  [] One Year     Additional Information Relevant to Certification   10. Comments or Explanations, If Necessary or Appropriate   Exhibits decreased strength, balance, endurance, range of motion and pain left hip impairing functional mobility, transfers, gait , gait distance, tolerance to activity and participation history of parkinson's disease with tremors, constant cueing throughout for participation and eyes open. Fall Risk. Left Hip Fracture with Repair. Confusion. Certifying Practitioner Information   11. Name of Practitioner: Dr. Giovanna Lopes MD   12. [place] Medicaid Provider Number, If Applicable:  [address] Provider Identifier (NPI):      Signature Information   14. Date of Signature: 11/15/2021 15. Name of Person Signing: Berkley El RN   12.  Signature and Professional Designation: Dr. Rosette Black / Berkley El RN     [facility] [id number]  Rev. 7/2015

## 2021-11-11 NOTE — PROGRESS NOTES
Pt daughter [facility] would like pt to go to [facility] for rehab, please 1st choice.

## 2021-11-11 NOTE — ED NOTES
Bed: 07  Expected date:   Expected time:   Means of arrival:   Comments:  Patients in 12 and [facility], RN  11/11/21 [id number]

## 2021-11-11 NOTE — ANESTHESIA PRE PROCEDURE
Department of Anesthesiology  Preprocedure Note       Name:  Mayra Raw   Age:  80 y.o.  :  1931                                          MRN:  [id number]         Date:  2021      Surgeon: Macario Hernandez): Viviana Aguilar DO    Procedure: Procedure(s):  LEFT IM NAIL PANKAJ INSERTION (JULIA)    Medications prior to admission:   Prior to Admission medications    Medication Sig Start Date End Date Taking?  Authorizing Provider   polyethylene glycol (GLYCOLAX) 17 GM/SCOOP powder Take 17 g by mouth 2 times daily   Yes Historical Provider, MD   white petrolatum OINT ointment Apply topically 2 times daily as needed    Historical Provider, MD   spironolactone (ALDACTONE) 25 MG tablet Take 1 tablet by mouth daily 21   Jhonatan Mueller MD   carbidopa-levodopa (SINEMET)  MG per tablet Take 1 tablet by mouth 3 times daily    Historical Provider, MD   dapsone 25 MG tablet Take 50 mg by mouth daily (with breakfast)    Historical Provider, MD   FLUoxetine (PROZAC) 10 MG tablet Take 10 mg by mouth nightly    Historical Provider, MD   fluticasone (FLONASE) 50 MCG/ACT nasal spray 2 sprays by Each Nostril route daily    Historical Provider, MD   aspirin 81 MG chewable tablet Take 81 mg by mouth daily    Historical Provider, MD   cloNIDine (CATAPRES) 0.3 MG tablet Take 0.3 mg by mouth 2 times daily    Historical Provider, MD   insulin lispro protamine & lispro (HUMALOG MIX) (75-25) 100 UNIT per ML SUSP injection vial Inject 20 Units into the skin every morning     Historical Provider, MD   insulin lispro protamine & lispro (HUMALOG MIX) (75-25) 100 UNIT per ML SUSP injection vial Inject 10 Units into the skin nightly     Historical Provider, MD   MAGNESIUM PO Take 400 mg by mouth daily     Historical Provider, MD   isosorbide mononitrate (IMDUR) 30 MG extended release tablet Take 30 mg by mouth daily     Historical Provider, MD   furosemide (LASIX) 20 MG tablet Take 20 mg by mouth daily     Historical Provider, MD   rosuvastatin (CRESTOR) 5 MG tablet Take 5 mg by mouth every other day    Historical Provider, MD   tamsulosin (FLOMAX) 0.4 MG capsule Take 0.8 mg by mouth nightly  4/11/19   Historical Provider, MD   metoprolol tartrate (LOPRESSOR) 50 MG tablet Take 25 mg by mouth 2 times daily     Historical Provider, MD   losartan (COZAAR) 100 MG tablet Take 100 mg by mouth daily. Historical Provider, MD   amlodipine (NORVASC) 5 MG tablet Take 5 mg by mouth daily     Historical Provider, MD   nitroGLYCERIN (NITROSTAT) 0.4 MG SL tablet Place 0.4 mg under the tongue every 5 minutes as needed. Historical Provider, MD   clopidogrel (PLAVIX) 75 MG tablet Take 75 mg by mouth daily.       Historical Provider, MD   dapsone 25 MG tablet Take 25 mg by mouth nightly     Historical Provider, MD       Current medications:    Current Facility-Administered Medications   Medication Dose Route Frequency Provider Last Rate Last Admin    sodium chloride flush 0.9 % injection 5-40 mL  5-40 mL IntraVENous 2 times per day Franz Brittle, MD   10 mL at 11/11/21 1101    sodium chloride flush 0.9 % injection 5-40 mL  5-40 mL IntraVENous PRN Franz Brittle, MD        0.9 % sodium chloride infusion  25 mL IntraVENous PRN Franz Brittle, MD        acetaminophen (TYLENOL) tablet 650 mg  650 mg Oral Q4H PRN Franz Brittle, MD        ondansetron (ZOFRAN-ODT) disintegrating tablet 4 mg  4 mg Oral Q8H PRN Franz Brittle, MD        Or    ondansetron [facility]) injection 4 mg  4 mg IntraVENous Q6H PRN Franz Brittle, MD        amLODIPine (NORVASC) tablet 5 mg  5 mg Oral Daily Franz Brittle, MD   5 mg at 11/11/21 1100    carbidopa-levodopa (SINEMET)  MG per tablet 1 tablet  1 tablet Oral TID Franz Brittle, MD   1 tablet at 11/11/21 1430    cloNIDine (CATAPRES) tablet 0.3 mg  0.3 mg Oral BID Franz Brittle, MD   0.3 mg at 11/11/21 1059    fluticasone (FLONASE) 50 MCG/ACT nasal spray 2 spray  2 spray Each Nostril Daily Rogelio Real MD   2 spray at 11/11/21 1147    furosemide (LASIX) tablet 20 mg  20 mg Oral Daily Rogelio Real MD   20 mg at 11/11/21 1059    isosorbide mononitrate (IMDUR) extended release tablet 30 mg  30 mg Oral Daily Rogelio Real MD   30 mg at 11/11/21 1100    losartan (COZAAR) tablet 100 mg  100 mg Oral Daily Rogelio Real MD   100 mg at 11/11/21 1100    magnesium oxide (MAG-OX) tablet 400 mg  400 mg Oral Daily Rogelio Real MD   400 mg at 11/11/21 1100    metoprolol tartrate (LOPRESSOR) tablet 25 mg  25 mg Oral BID Rogelio Real MD   25 mg at 11/11/21 1059    nitroGLYCERIN (NITROSTAT) SL tablet 0.4 mg  0.4 mg SubLINGual Q5 Min PRN Rogelio Real MD        polyethylene glycol [facility]) packet 17 g  17 g Oral BID Rogelio Real MD   17 g at 11/11/21 1101    rosuvastatin (CRESTOR) tablet 5 mg  5 mg Oral Every Other Day Rogelio Real MD        spironolactone (ALDACTONE) tablet 25 mg  25 mg Oral Daily Rogelio Real MD        [facility] capsule 0.8 mg  0.8 mg Oral Nightly Rogelio Real MD        Aloe Vesta Protective ointment OINT 56 g  56 g Topical BID PRN Rogelio Real MD        dapsone tablet 25 mg  25 mg Oral Nightly MD Patricia Chang ON 11/12/2021] dapsone tablet 50 mg  50 mg Oral Daily with breakfast Rogelio Real MD        FLUoxetine (PROZAC) capsule 10 mg  10 mg Oral Nightly Rogelio Real MD        oxyCODONE-acetaminophen (PERCOCET) 5-325 MG per tablet 1 tablet  1 tablet Oral Q4H PRN Pati Higgins DO        Or    oxyCODONE-acetaminophen (PERCOCET) 5-325 MG per tablet 2 tablet  2 tablet Oral Q4H PRN Pati Higgins DO        [START ON 11/12/2021] ceFAZolin (ANCEF) 2000 mg in sterile water 20 mL IV syringe  2,000 mg IntraVENous On Call to DO Seda        insulin lispro protamine & lispro (HUMALOG MIX) (75-25) 100 UNIT per ML injection vial SUSP 20 Units  20 Units SubCUTAneous TEE Real MD 20 Units at 11/11/21 1141    insulin lispro protamine & lispro (HUMALOG MIX) (75-25) 100 UNIT per ML injection vial SUSP 10 Units  10 Units SubCUTAneous Nightly Winter Garcia MD           Allergies:  No Known Allergies    Problem List:    Patient Active Problem List   Diagnosis Code    Right cataract H26.9    Left cataract H26.9    Lumbar stenosis M48.061    DDD (degenerative disc disease), lumbar M51.36    History of carotid endarterectomy Z98.890    History of left common carotid artery stent placement Z98.890, Z95.828    Ambulatory dysfunction R26.2    Acute respiratory failure with hypoxia ([facility]) J96.01    Acute CHF (congestive heart failure) ([facility]) I50.9    Congestive heart failure due to cardiomyopathy ([facility]) I50.9, I42.9    Acute diastolic heart failure ([facility]) I50.31    Type 2 diabetes mellitus with circulatory disorder, with long-term current use of insulin ([facility]) E11.59, Z79.4    Stage 2 chronic kidney disease N18.2    Closed fracture of femur, intertrochanteric, left, initial encounter ([facility] [address] S90.512B       Past Medical History:        Diagnosis Date    Acute on chronic diastolic (congestive) heart failure (HCC)     Ambulatory dysfunction 4/30/2019    Hx severe lumbar psinal stenosis, severe at L4/5 level-MR lumbar spine, 2018    Benign prostate hyperplasia     Blood transfusion     CAD (coronary artery disease)     Cataract     CHF (congestive heart failure) ([facility])     Chronic idiopathic constipation     Diabetes mellitus ([facility])     Dysphagia     History of carotid endarterectomy 4/30/2019    Right CEA    History of left common carotid artery stent placement 4/30/2019    Hyperlipidemia     Hypertension     Parkinson's disease ([facility] [address]     Spinal stenosis, lumbar        Past Surgical History:        Procedure Laterality Date    ABDOMINAL AORTIC ANEURYSM REPAIR, ENDOVASCULAR  1 year ago    2 stents    BACK SURGERY      CARDIAC SURGERY  2000    2 vesselCABG    CAROTID noted.  stage I diastolic dysfunction. Left atrium is mildly enlarged. Increased left atrial volume. Interatrial septum not well visualized but appears intact. Right ventricle is mildly dilated with normal function. Right atrium is mildly enlarged. There is trace mitral regurgitation. No mitral valve prolapse. The aortic valve appears mildly sclerotic with focal areas of calcification. No hemodynamically significant aortic stenosis. There is trace aortic regurgitation. There is trace tricuspid regurgitation, RVSP 26mmHg. Normal aortic root size. No evidence of pericardial effusion. No intra cardiac mass or thrombus. Neuro/Psych:   (+) neuromuscular disease: Parkinson's disease, depression/anxiety             GI/Hepatic/Renal:   (+) renal disease (Stage II CKD:  24/1.1 w/ GFR >60): CRI,          ROS comment: Dysphagia    BPH. Endo/Other:    (+) DiabetesType II DM, using insulin, blood dyscrasia (Plavix LD 8/2/21?; 10/31.2 with plt. of 114K  ): anticoagulation therapy and anemia, arthritis (OA, DJD, DDD): OA., . Pt had no PAT visit        ROS comment: Ambulatory dysfunction     Hx severe lumbar spinal stenosis, severe at L4/5 level - MRI lumbar spine, 2018  Abdominal:             Vascular:           ROS comment: Carotid stenosis s/p R CEA with history of left common carotid artery stent placement. Other Findings:             Anesthesia Plan      general     ASA 3       Induction: intravenous. MIPS: Postoperative opioids intended and Prophylactic antiemetics administered. Plan discussed with CRNA. Kirk Porter DO   11/11/2021      History, data, and pertinent studies from chart review. Above represents information available via the shared medical record including previous anesthetic, medication, and allergy history.   Confirmation of above and final disposition per DOS anesthesiologist.    Kirk Porter DO  Staff Anesthesiologist  November 11, 2021  4:26 PM

## 2021-11-11 NOTE — CONSULTS
Department of Orthopedic Surgery  Resident Consult Note          Reason for Consult:  Fall, Left IT fracture    HISTORY OF PRESENT ILLNESS:       Patient is a 80 y.o. male who presents with left hip fracture after a fall. Patient presents from NH after ground-level fall, patient states that he is supposed to have someone assist him when he is ambulating however he got up in the middle of the night to use the bathroom and tripped falling onto his left side. Patient ambulates with a walker. He does have significant past medical history including Parkinson's disease, diabetes, history of coronary artery bypass, and CHF. Sherryle Moder Denies numbness/tingling/paresthesias. Denies any other orthopedic complaints at this time.       Past Medical History:        Diagnosis Date    Acute on chronic diastolic (congestive) heart failure (HCC)     Ambulatory dysfunction 4/30/2019    Hx severe lumbar psinal stenosis, severe at L4/5 level-MR lumbar spine, 2018    Benign prostate hyperplasia     Blood transfusion     CAD (coronary artery disease)     Cataract     CHF [facility]congestive heart failure) ([facility])     Chronic idiopathic constipation     Diabetes mellitus ([facility])     Dysphagia     History of carotid endarterectomy 4/30/2019    Right CEA    History of left common carotid artery stent placement 4/30/2019    Hyperlipidemia     Hypertension     Parkinson's disease [facility] [address]     Spinal stenosis, lumbar      Past Surgical History:        Procedure Laterality Date    ABDOMINAL AORTIC ANEURYSM REPAIR, ENDOVASCULAR  1 year ago    2 stents    BACK SURGERY      CARDIAC SURGERY  2000    2 vesselCABG    CAROTID ENDARTERECTOMY  rt and left stent in rt    Bilateral    CATARACT REMOVAL WITH IMPLANT Right 04/08/14    CATARACT REMOVAL WITH IMPLANT Left 9 23 14    COLONOSCOPY      ENDOSCOPY, COLON, DIAGNOSTIC      TONSILLECTOMY      UPPER GASTROINTESTINAL ENDOSCOPY       Current Medications:   No current facility-administered medications sensation grossly intact to C4-T1 dermatomes, compartments soft and compressible. · rightLE: No obvious signs of trauma. -TTP to foot, ankle, leg, knee, thigh, hip.-- Patient able to flex/extend toes, ankle, knee and hip with active and passive ROM without pain,+2/4 DP & PT pulses, cap refill <3sec, +5/5 PF/DF/EHL, distal sensation grossly intact to L4-S1 dermatomes, compartments soft and compressible. · Pelvis: -TTP, -Log roll, -Heel strike     DATA:    CBC:   Lab Results   Component Value Date    WBC 8.5 11/11/2021    RBC 2.97 11/11/2021    HGB 10.0 11/11/2021    HCT 31.2 11/11/2021    .1 11/11/2021    MCH 33.7 11/11/2021    MCHC 32.1 11/11/2021    RDW 13.0 11/11/2021     11/11/2021    MPV 10.6 11/11/2021     PT/INR:    Lab Results   Component Value Date    PROTIME 11.6 11/11/2021    INR 1.0 11/11/2021       Radiology Review:  Multiple views of the pelvis and left hip reviewed. There is a mildly displaced, standard obliquity intertrochanteric fracture on the left. Left femur films reviewed, no history of hardware in the left knee. IMPRESSION:  · Left intertrochanteric hip fracture    PLAN:  · Patient is admitted to medicine  · Plan for OR, likely tomorrow, patient will need medically optimized and cleared before surgery  · Pain control  · N.p.o. past midnight tonight  · Hold anticoagulants  · I was present with the resident during the history and exam. I discussed the case with the resident and agree with the findings and plan as documented in the resident's note.     Shyann Waite,   11/11/21

## 2021-11-11 NOTE — PLAN OF CARE
Problem: Pain:  Goal: Pain level will decrease  Outcome: Met This Shift     Problem: Pain:  Goal: Control of acute pain  Outcome: Met This Shift     Problem: Pain:  Goal: Control of chronic pain  Outcome: Met This Shift     Problem: Skin Integrity:  Goal: Absence of new skin breakdown  Outcome: Met This Shift     Problem: Fluid Volume:  Goal: Maintenance of adequate hydration will improve  Outcome: Ongoing     Problem: Skin Integrity:  Goal: Will show no infection signs and symptoms  Outcome: Not Met This Shift     Problem: Mental Status - Impaired:  Goal: Mental status will improve  Outcome: Not Met This Shift     Problem: Mobility - Impaired:  Goal: Mobility will improve  Outcome: Not Met This Shift     Problem: Urinary Elimination:  Goal: Ability to recognize the need to void and respond appropriately will improve  Outcome: Not Met This Shift     Problem: Urinary Elimination:  Goal: Will remain free from infection  Outcome: Not Met This Shift

## 2021-11-11 NOTE — ED PROVIDER NOTES
Patient is a 79 y/o male who presents to the ED via EMS from the nursing home after a fall. Patient states that he was coming back from the bathroom with his walker when he lost his balance and fell. He states that he hit his head and his left hip. He denies any loss of consciousness. Currently, he reports 8/10 pain in his left hip. He denies any other injury. Review of Systems   Constitutional: Negative for chills and fever. HENT: Negative for ear pain, sinus pressure and sore throat. Eyes: Negative for pain, discharge and redness. Respiratory: Negative for cough, shortness of breath and wheezing. Cardiovascular: Negative for chest pain. Gastrointestinal: Negative for abdominal pain, diarrhea, nausea and vomiting. Genitourinary: Negative for dysuria and frequency. Musculoskeletal: Positive for arthralgias. Negative for back pain. Skin: Negative for rash and wound. Neurological: Negative for weakness and headaches. Hematological: Negative for adenopathy. All other systems reviewed and are negative. Physical Exam  Vitals and nursing note reviewed. Constitutional:       General: He is not in acute distress. HENT:      Head: Normocephalic and atraumatic. Right Ear: External ear normal.      Left Ear: External ear normal.      Nose: Nose normal.      Mouth/Throat:      Mouth: Mucous membranes are moist.   Eyes:      Conjunctiva/sclera: Conjunctivae normal.      Pupils: Pupils are equal, round, and reactive to light. Neck:      Comments: Positive midline cervical tenderness to palpation. Cardiovascular:      Rate and Rhythm: Normal rate and regular rhythm. Heart sounds: No murmur heard. Pulmonary:      Effort: Pulmonary effort is normal. No respiratory distress. Breath sounds: Normal breath sounds. No stridor. No wheezing, rhonchi or rales. Abdominal:      General: Bowel sounds are normal. There is no distension. Palpations: Abdomen is soft. Tenderness: There is no abdominal tenderness. There is no guarding. Musculoskeletal:         General: Normal range of motion. Cervical back: Tenderness present. Right hip: Normal. No deformity, tenderness, bony tenderness or crepitus. Left hip: Tenderness and bony tenderness present. No deformity or crepitus. Left upper leg: No swelling, deformity, tenderness or bony tenderness. Skin:     General: Skin is warm and dry. Findings: No rash. Neurological:      Mental Status: He is alert and oriented to person, place, and time. Procedures     MDM                     --------------------------------------------- PAST HISTORY ---------------------------------------------  Past Medical History:  has a past medical history of Acute on chronic diastolic (congestive) heart failure ([address] Ambulatory dysfunction, Benign prostate hyperplasia, Blood transfusion, CAD (coronary artery disease), Cataract, CHF (congestive heart failure) ([address] Chronic idiopathic constipation, Diabetes mellitus ([address] Dysphagia, History of carotid endarterectomy, History of left common carotid artery stent placement, Hyperlipidemia, Hypertension, Parkinson's disease ([address] and Spinal stenosis, lumbar. Past Surgical History:  has a past surgical history that includes back surgery; Carotid endarterectomy (rt and left stent in rt); Tonsillectomy; Colonoscopy; Upper gastrointestinal endoscopy; Cardiac surgery (2000); Endoscopy, colon, diagnostic; AAA repair, endovascular (1 year ago); Cataract removal with implant (Right, 04/08/14); and Cataract removal with implant (Left, 9 23 14). Social History:  reports that he has quit smoking. He has never used smokeless tobacco. He reports that he does not drink alcohol and does not use drugs. Family History: family history is not on file. The patients home medications have been reviewed.     Allergies: Patient has no known allergies. -------------------------------------------------- RESULTS -------------------------------------------------    LABS:  No results found for this visit on 11/11/21. RADIOLOGY:  XR FEMUR LEFT (MIN 2 VIEWS)   Final Result   Intertrochanteric fracture. XR HIP LEFT (2-3 VIEWS)   Final Result   Intertrochanteric left hip fracture. CT HEAD WO CONTRAST   Final Result   No acute intracranial findings. Consider MRI if symptoms persist.      Volume loss and findings suggestive of chronic microvascular ischemia. CT CERVICAL SPINE WO CONTRAST   Final Result   Findings which are thought to be chronic as detailed above.                 ------------------------- NURSING NOTES AND VITALS REVIEWED ---------------------------  Date / Time Roomed:  11/11/2021  1:51 AM  ED Bed Assignment:  07/07    The nursing notes within the ED encounter and vital signs as below have been reviewed. Patient Vitals for the past 24 hrs:   BP Temp Pulse Resp SpO2 Weight   11/11/21 0157 (!) 190/81 98 °F (36.7 °C) 72 16 92 % 170 lb (77.1 kg)       Oxygen Saturation Interpretation: Normal    ------------------------------------------ PROGRESS NOTES ------------------------------------------  Re-evaluation(s):  Time: 0330. Patients symptoms show no change  Repeat physical examination is not changed    Counseling:  I have spoken with the patient and discussed todays results, in addition to providing specific details for the plan of care and counseling regarding the diagnosis and prognosis. Their questions are answered at this time and they are agreeable with the plan of admission.    --------------------------------- ADDITIONAL PROVIDER NOTES ---------------------------------  Consultations:  Spoke with Dr. Candelario Cody. Discussed case. They will admit the patient.   This patient's ED course included: a personal history and physicial examination, re-evaluation prior to disposition, multiple bedside re-evaluations and IV medications    This patient has remained hemodynamically stable during their ED course. Diagnosis:  1. Closed fracture of femur, intertrochanteric, left, initial encounter ([facility] [address]        Disposition:  Patient's disposition: Admit to med/surg floor  Patient's condition is stable.              [address] [facility],   11/14/21 8044

## 2021-11-12 NOTE — ANESTHESIA PROCEDURE NOTES
Spinal Block    Patient location during procedure: OR  Reason for block: primary anesthetic  Staffing  Performed: resident/CRNA   Anesthesiologist: Mary Gallo MD  Resident/CRNA: ENRIQUE Mack CRNA  Preanesthetic Checklist  Completed: patient identified, IV checked, site marked, risks and benefits discussed, surgical consent, monitors and equipment checked, pre-op evaluation, timeout performed, anesthesia consent given, oxygen available and patient being monitored  Spinal Block  Patient position: sitting  Prep: Betadine  Patient monitoring: continuous pulse ox and frequent blood pressure checks  Approach: midline  Location: L2/L3  Provider prep: mask and sterile gloves  Local infiltration: lidocaine  Agent: bupivacaine  Needle  Needle type: Pencan   Needle gauge: 25 G  Needle length: 3.5 in  Assessment  Swirl obtained: Yes  CSF: clear  Attempts: 1  Hemodynamics: stable

## 2021-11-12 NOTE — CARE COORDINATION
11/12/2021: SS Note:  Notified by [facility] admissions liaison for [facility] that pt's wife also resides at [place] [address]. with pt and that pt's step daughter Alfredo Palmer is listed as the primary contact, [facility] anticipates having a rehab bed available on Monday but sw to check with family to confirm SNF preference. Pt currently in OR, sw unable to reach pt's step daughter Alfredo Palmer by phone, mail box is full and unable to leave vm message, per chart review and nursing note Alfredo Palmer relayed [facility] as first preference for anticipated PRO placement on discharge, referral made to [facility] admissions liaison who will follow post-op for therapy evals to confirm acceptance for anticipated skilled placement on Monday 11/15, pt has Medicare, NO PRE CERT NEEDED, sw/dinorah to follow. Electronically signed by KELLY Lu on 11/12/2021 at 1:29 PM

## 2021-11-12 NOTE — H&P
Department of Internal Medicine            CHIEF COMPLAINT:  Hip pain from fall    HISTORY OF PRESENT ILLNESS:      This is a pt with parkinson disease. Pt got up on his own and fell and fracture left hip. He is set for surgery tomorrow    PAST MEDICAL Hx:  Past Medical History:   Diagnosis Date    Acute on chronic diastolic (congestive) heart failure ([address]     Ambulatory dysfunction 4/30/2019    Hx severe lumbar psinal stenosis, severe at L4/5 level-MR lumbar spine, 2018    Benign prostate hyperplasia     Blood transfusion     CAD (coronary artery disease)     Cataract     CHF (congestive heart failure) (HCC)     Chronic idiopathic constipation     Diabetes mellitus (HCC)     Dysphagia     History of carotid endarterectomy 4/30/2019    Right CEA    History of left common carotid artery stent placement 4/30/2019    Hyperlipidemia     Hypertension     Parkinson's disease ([address]     Spinal stenosis, lumbar        PAST SURGICAL Hx:   Past Surgical History:   Procedure Laterality Date    ABDOMINAL AORTIC ANEURYSM REPAIR, ENDOVASCULAR  1 year ago    2 stents    BACK SURGERY      CARDIAC SURGERY  2000    2 vesselCABG    CAROTID ENDARTERECTOMY  rt and left stent in rt    Bilateral    CATARACT REMOVAL WITH IMPLANT Right 04/08/14    CATARACT REMOVAL WITH IMPLANT Left 9 23 14    COLONOSCOPY      ENDOSCOPY, COLON, DIAGNOSTIC      TONSILLECTOMY      UPPER GASTROINTESTINAL ENDOSCOPY         FAMILY Hx:  History reviewed. No pertinent family history. HOME MEDICATIONS:  Prior to Admission medications    Medication Sig Start Date End Date Taking?  Authorizing Provider   polyethylene glycol (GLYCOLAX) 17 GM/SCOOP powder Take 17 g by mouth 2 times daily   Yes Historical Provider, MD   white petrolatum OINT ointment Apply topically 2 times daily as needed    Historical Provider, MD   spironolactone (ALDACTONE) 25 MG tablet Take 1 tablet by mouth daily 6/14/21   Sara Summers MD   carbidopa-levodopa (SINEMET)  MG per tablet Take 1 tablet by mouth 3 times daily    Historical Provider, MD   dapsone 25 MG tablet Take 50 mg by mouth daily (with breakfast)    Historical Provider, MD   FLUoxetine (PROZAC) 10 MG tablet Take 10 mg by mouth nightly    Historical Provider, MD   fluticasone (FLONASE) 50 MCG/ACT nasal spray 2 sprays by Each Nostril route daily    Historical Provider, MD   aspirin 81 MG chewable tablet Take 81 mg by mouth daily    Historical Provider, MD   cloNIDine (CATAPRES) 0.3 MG tablet Take 0.3 mg by mouth 2 times daily    Historical Provider, MD   insulin lispro protamine & lispro (HUMALOG MIX) (75-25) 100 UNIT per ML SUSP injection vial Inject 20 Units into the skin every morning     Historical Provider, MD   insulin lispro protamine & lispro (HUMALOG MIX) (75-25) 100 UNIT per ML SUSP injection vial Inject 10 Units into the skin nightly     Historical Provider, MD   MAGNESIUM PO Take 400 mg by mouth daily     Historical Provider, MD   isosorbide mononitrate (IMDUR) 30 MG extended release tablet Take 30 mg by mouth daily     Historical Provider, MD   furosemide (LASIX) 20 MG tablet Take 20 mg by mouth daily     Historical Provider, MD   rosuvastatin (CRESTOR) 5 MG tablet Take 5 mg by mouth every other day    Historical Provider, MD   tamsulosin (FLOMAX) 0.4 MG capsule Take 0.8 mg by mouth nightly  4/11/19   Historical Provider, MD   metoprolol tartrate (LOPRESSOR) 50 MG tablet Take 25 mg by mouth 2 times daily     Historical Provider, MD   losartan (COZAAR) 100 MG tablet Take 100 mg by mouth daily. Historical Provider, MD   amlodipine (NORVASC) 5 MG tablet Take 5 mg by mouth daily     Historical Provider, MD   nitroGLYCERIN (NITROSTAT) 0.4 MG SL tablet Place 0.4 mg under the tongue every 5 minutes as needed. Historical Provider, MD   clopidogrel (PLAVIX) 75 MG tablet Take 75 mg by mouth daily.       Historical Provider, MD   dapsone 25 MG tablet Take 25 mg by mouth nightly Historical Provider, MD       ALLERGIES:  Patient has no known allergies. SOCIAL Hx:  Social History     Socioeconomic History    Marital status:      Spouse name: Not on file    Number of children: Not on file    Years of education: Not on file    Highest education level: Not on file   Occupational History    Not on file   Tobacco Use    Smoking status: Former Smoker    Smokeless tobacco: Never Used    Tobacco comment: quit smoking 15 yrs ago   Vaping Use    Vaping Use: Never used   Substance and Sexual Activity    Alcohol use: No    Drug use: No    Sexual activity: Not Currently   Other Topics Concern    Not on file   Social History Narrative    Not on file     Social Determinants of Health     Financial Resource Strain:     Difficulty of Paying Living Expenses: Not on file   Food Insecurity:     Worried About [address] in the Last Year: Not on file    [address] [employer] in the Last Year: Not on file   Transportation Needs:     Lack of Transportation (Medical): Not on file    Lack of Transportation (Non-Medical):  Not on file   Physical Activity:     Days of Exercise per Week: Not on file    Minutes of Exercise per Session: Not on file   Stress:     Feeling of Stress : Not on file   Social Connections:     Frequency of Communication with Friends and Family: Not on file    Frequency of Social Gatherings with Friends and Family: Not on file    Attends Baptism Services: Not on file    Active Member of Clubs or Organizations: Not on file    Attends Club or Organization Meetings: Not on file    Marital Status: Not on file   Intimate Partner Violence:     Fear of Current or Ex-Partner: Not on file    Emotionally Abused: Not on file    Physically Abused: Not on file    Sexually Abused: Not on file   Housing Stability:     Unable to Pay for Housing in the Last Year: Not on file    Number of [place] in the Last Year: Not on file    Unstable Housing in the Last Year: Not on file       ROS:  General:   Denies chills, fatigue, fever, malaise, night sweats or weight loss    Psychological:   Denies anxiety, disorientation or hallucinations    ENT:    Denies epistaxis, headaches, vertigo or visual changes    Cardiovascular:   Denies any chest pain, irregular heartbeats, or palpitations. No paroxysmal nocturnal dyspnea. Respiratory:   Denies shortness of breath, coughing, sputum production, hemoptysis, or wheezing. No orthopnea. Gastrointestinal:   Denies nausea, vomiting, diarrhea, or constipation. Denies any abdominal pain. Denies change in bowel habits or stools. Genito-Urinary:    Denies any urgency, frequency, hematuria. Voiding without difficulty. Musculoskeletal:   Hip pain    Neurology:    parkinson    Derm:    Denies any rashes, ulcers, or excoriations. Denies bruising. Extremities:   Denies any lower extremity swelling or edema. PHYSICAL EXAM:  VITALS:  Vitals:    11/11/21 1700   BP: (!) 142/66   Pulse: 84   Resp: 16   Temp: 99.8 °F (37.7 °C)   SpO2: 95%         CONSTITUTIONAL:    Awake, alert, cooperative, no apparent distress, and appears stated age    EYES:    PERRL, EOMI, sclera clear, conjunctiva normal    ENT:    Normocephalic, atraumatic, sinuses nontender on palpation. External ears without lesions. Oral pharynx with moist mucus membranes. Tonsils without erythema or exudates. NECK:    Supple, symmetrical, trachea midline, no adenopathy, thyroid symmetric, not enlarged and no tenderness, skin normal, no bruits, no JVD    HEMATOLOGIC/LYMPHATICS:    No cervical lymphadenopathy and no supraclavicular lymphadenopathy    LUNGS:    Symmetric.  No increased work of breathing, good air exchange, clear to auscultation bilaterally, no wheezes, rhonchi, or rales,     CARDIOVASCULAR:    Normal apical impulse, regular rate and rhythm, normal S1 and S2, no S3 or S4, and no murmur noted    ABDOMEN:    No scars, normal bowel sounds, soft, non-distended, non-tender, no masses palpated, no hepatosplenomegaly, no rebound or guarding elicited on palpation     MUSCULOSKELETAL:    There is no redness, warmth, or swelling of the joints. Full range of motion noted. Motor strength is 5 out of 5 all extremities bilaterally. Tone is normal.    NEUROLOGIC:    Awake, alert, oriented to name, place and time. Cranial nerves II-XII are grossly intact. Motor is 5 out of 5 bilaterally. SKIN:    No bruising or bleeding. No redness, warmth, or swelling    EXTREMITIES:    Peripheral pulses present. No edema, cyanosis, or swelling.       LABORATORY DATA:  CBC:   Lab Results   Component Value Date    WBC 8.5 11/11/2021    RBC 2.97 11/11/2021    HGB 10.0 11/11/2021    HCT 31.2 11/11/2021    .1 11/11/2021    MCH 33.7 11/11/2021    MCHC 32.1 11/11/2021    RDW 13.0 11/11/2021     11/11/2021    MPV 10.6 11/11/2021     CMP:    Lab Results   Component Value Date     11/11/2021    K 4.4 11/11/2021    K 4.7 10/18/2021     11/11/2021    CO2 23 11/11/2021    BUN 24 11/11/2021    CREATININE 1.1 11/11/2021    GFRAA >60 11/11/2021    LABGLOM >60 11/11/2021    GLUCOSE 102 11/11/2021    GLUCOSE 113 12/08/2011    PROT 6.1 11/11/2021    LABALBU 3.4 11/11/2021    LABALBU 4.2 12/08/2011    CALCIUM 8.3 11/11/2021    BILITOT 0.6 11/11/2021    ALKPHOS 103 11/11/2021    AST 20 11/11/2021    ALT 10 11/11/2021       ASSESSMENT:  Patient Active Problem List   Diagnosis    Right cataract    Left cataract    Lumbar stenosis    DDD (degenerative disc disease), lumbar    History of carotid endarterectomy    History of left common carotid artery stent placement    Ambulatory dysfunction    Acute respiratory failure with hypoxia (HCC)    Acute CHF (congestive heart failure) (HCC)    Congestive heart failure due to cardiomyopathy (HCC)    Acute diastolic heart failure (HCC)    Type 2 diabetes mellitus with circulatory disorder, with long-term current use of insulin (HCC)    Stage 2 chronic kidney disease    Closed fracture of femur, intertrochanteric, left, initial encounter ([facility]       PLAN:  Pt is medically cleared for surgery. Resume home med.  Yani Acosta MD, MAURA.  Kory Sherwood PM  11/11/2021

## 2021-11-12 NOTE — ANESTHESIA POSTPROCEDURE EVALUATION
Department of Anesthesiology  Postprocedure Note    Patient: Forrest Ortega  MRN: [id number]  YOB: 1931  Date of evaluation: 11/12/2021  Time:  3:59 PM     Procedure Summary     Date: 11/12/21 Room / Location: [address] [place] [address] / [address] [place] [address]    Anesthesia Start: 1250 Anesthesia Stop: 1440    Procedure: LEFT IM NAIL PANKAJ INSERTION (JULIA) (Left Hip) Diagnosis: (LEFT INTERTROCHANTERIC FRACTURE)    Surgeons: Giles Romano DO Responsible Provider: Jackson Kunz MD    Anesthesia Type: spinal ASA Status: 3          Anesthesia Type: spinal    Reynaldo Phase I: Reynaldo Score: 9    Reynaldo Phase II:      Last vitals: Reviewed and per EMR flowsheets.        Anesthesia Post Evaluation    Patient location during evaluation: PACU  Patient participation: complete - patient participated  Level of consciousness: awake  Pain score: 0  Airway patency: patent  Nausea & Vomiting: no nausea  Complications: no  Cardiovascular status: blood pressure returned to baseline  Respiratory status: acceptable  Hydration status: euvolemic

## 2021-11-12 NOTE — ANESTHESIA PRE PROCEDURE
Department of Anesthesiology  Preprocedure Note       Name:  Mayra Raw   Age:  80 y.o.  :  1931                                          MRN:  [id number]         Date:  2021      Surgeon: Macario Hernandez): Viviana Aguilar DO    Procedure: Procedure(s):  LEFT IM NAIL PANKAJ INSERTION (JULIA)    Medications prior to admission:   Prior to Admission medications    Medication Sig Start Date End Date Taking?  Authorizing Provider   polyethylene glycol (GLYCOLAX) 17 GM/SCOOP powder Take 17 g by mouth 2 times daily    Historical Provider, MD   white petrolatum OINT ointment Apply topically 2 times daily as needed    Historical Provider, MD   spironolactone (ALDACTONE) 25 MG tablet Take 1 tablet by mouth daily 21   Jhonatan Mueller MD   carbidopa-levodopa (SINEMET)  MG per tablet Take 1 tablet by mouth 3 times daily    Historical Provider, MD   dapsone 25 MG tablet Take 50 mg by mouth daily (with breakfast)    Historical Provider, MD   FLUoxetine (PROZAC) 10 MG tablet Take 10 mg by mouth nightly    Historical Provider, MD   fluticasone (FLONASE) 50 MCG/ACT nasal spray 2 sprays by Each Nostril route daily    Historical Provider, MD   aspirin 81 MG chewable tablet Take 81 mg by mouth daily    Historical Provider, MD   cloNIDine (CATAPRES) 0.3 MG tablet Take 0.3 mg by mouth 2 times daily    Historical Provider, MD   insulin lispro protamine & lispro (HUMALOG MIX) (75-25) 100 UNIT per ML SUSP injection vial Inject 20 Units into the skin every morning     Historical Provider, MD   insulin lispro protamine & lispro (HUMALOG MIX) (75-25) 100 UNIT per ML SUSP injection vial Inject 10 Units into the skin nightly     Historical Provider, MD   MAGNESIUM PO Take 400 mg by mouth daily     Historical Provider, MD   isosorbide mononitrate (IMDUR) 30 MG extended release tablet Take 30 mg by mouth daily     Historical Provider, MD   furosemide (LASIX) 20 MG tablet Take 20 mg by mouth daily     Historical Provider, MD rosuvastatin (CRESTOR) 5 MG tablet Take 5 mg by mouth every other day    Historical Provider, MD   tamsulosin (FLOMAX) 0.4 MG capsule Take 0.8 mg by mouth nightly  4/11/19   Historical Provider, MD   metoprolol tartrate (LOPRESSOR) 50 MG tablet Take 25 mg by mouth 2 times daily     Historical Provider, MD   losartan (COZAAR) 100 MG tablet Take 100 mg by mouth daily. Historical Provider, MD   amlodipine (NORVASC) 5 MG tablet Take 5 mg by mouth daily     Historical Provider, MD   nitroGLYCERIN (NITROSTAT) 0.4 MG SL tablet Place 0.4 mg under the tongue every 5 minutes as needed. Historical Provider, MD   clopidogrel (PLAVIX) 75 MG tablet Take 75 mg by mouth daily. Historical Provider, MD   dapsone 25 MG tablet Take 25 mg by mouth nightly     Historical Provider, MD       Current medications:    No current facility-administered medications for this visit. No current outpatient medications on file.      Facility-Administered Medications Ordered in Other Visits   Medication Dose Route Frequency Provider Last Rate Last Admin    sodium chloride flush 0.9 % injection 5-40 mL  5-40 mL IntraVENous 2 times per day Blanca Lynn MD   10 mL at 11/11/21 2136    sodium chloride flush 0.9 % injection 5-40 mL  5-40 mL IntraVENous PRN Blanca Lynn MD        0.9 % sodium chloride infusion  25 mL IntraVENous PRN Blanca Lynn MD        acetaminophen (TYLENOL) tablet 650 mg  650 mg Oral Q4H PRN Blanca Lynn MD        ondansetron (ZOFRAN-ODT) disintegrating tablet 4 mg  4 mg Oral Q8H PRN Blanca Lynn MD        Or    ondansetron [facility]) injection 4 mg  4 mg IntraVENous Q6H PRN Blanca Lynn MD        amLODIPine [facility]) tablet 5 mg  5 mg Oral Daily Blanca Lynn MD   5 mg at 11/11/21 1100    carbidopa-levodopa (SINEMET)  MG per tablet 1 tablet  1 tablet Oral TID Blanca Lynn MD   1 tablet at 11/11/21 2133    cloNIDine (CATAPRES) tablet 0.3 mg  0.3 mg Oral BID [facility] Yan Leon MD   0.3 mg at 11/11/21 2132    fluticasone (FLONASE) 50 MCG/ACT nasal spray 2 spray  2 spray Each Nostril Daily Whitaker MD Orestes   2 spray at 11/11/21 1147    furosemide (LASIX) tablet 20 mg  20 mg Oral Daily Whitaker MD Orestes   20 mg at 11/11/21 1059    isosorbide mononitrate (IMDUR) extended release tablet 30 mg  30 mg Oral Daily Whitaker MD Orestes   30 mg at 11/11/21 1100    losartan (COZAAR) tablet 100 mg  100 mg Oral Daily Whitaker MD Orestes   100 mg at 11/11/21 1100    magnesium oxide (MAG-OX) tablet 400 mg  400 mg Oral Daily Whitaker MD Orestes   400 mg at 11/11/21 1100    metoprolol tartrate (LOPRESSOR) tablet 25 mg  25 mg Oral BID Whitaker MD Orestes   25 mg at 11/12/21 0913    nitroGLYCERIN (NITROSTAT) SL tablet 0.4 mg  0.4 mg SubLINGual Q5 Min PRN Whitaker MD Orestes        polyethylene glycol [facility]) packet 17 g  17 g Oral BID Whitaker MD Orestes   17 g at 11/11/21 2135    tamsulosin (FLOMAX) capsule 0.8 mg  0.8 mg Oral Nightly Whitaker MD Orestes   0.8 mg at 11/11/21 2132    Aloe Vesta Protective ointment OINT 56 g  56 g Topical BID PRN Whitaker MD Orestes        dapsone tablet 25 mg  25 mg Oral Nightly Whitaker MD Orestes   25 mg at 11/11/21 2134    dapsone tablet 50 mg  50 mg Oral Daily with breakfast Whitaker MD Orestes        FLUoxetine (PROZAC) capsule 10 mg  10 mg Oral Nightly Whitaker MD Orestes   10 mg at 11/11/21 2134    oxyCODONE-acetaminophen (PERCOCET) 5-325 MG per tablet 1 tablet  1 tablet Oral Q4H PRN Fernando Pedroza DO        Or    oxyCODONE-acetaminophen (PERCOCET) 5-325 MG per tablet 2 tablet  2 tablet Oral Q4H PRN Fernando Pedroza DO   2 tablet at 11/11/21 1750    ceFAZolin (ANCEF) 2000 mg in sterile water 20 mL IV syringe  2,000 mg IntraVENous On Call to DO Seda        insulin lispro protamine & lispro (HUMALOG MIX) (75-25) 100 UNIT per ML injection vial SUSP 20 Units  20 Units SubCUTAneous TEE Carlisle MD   20 Units at 11/11/21 1141    insulin lispro protamine & lispro (HUMALOG MIX) (75-25) 100 UNIT per ML injection vial SUSP 10 Units  10 Units SubCUTAneous Nightly Derek Curry MD           Allergies:  No Known Allergies    Problem List:    Patient Active Problem List   Diagnosis Code    Right cataract H26.9    Left cataract H26.9    Lumbar stenosis M48.061    DDD (degenerative disc disease), lumbar M51.36    History of carotid endarterectomy Z98.890    History of left common carotid artery stent placement Z98.890, Z95.828    Ambulatory dysfunction R26.2    Acute respiratory failure with hypoxia ([facility]) J96.01    Acute CHF (congestive heart failure) ([facility]) I50.9    Congestive heart failure due to cardiomyopathy ([facility]) I50.9, I42.9    Acute diastolic heart failure ([facility]) I50.31    Type 2 diabetes mellitus with circulatory disorder, with long-term current use of insulin ([facility]) E11.59, Z79.4    Stage 2 chronic kidney disease N18.2    Closed fracture of femur, intertrochanteric, left, [facility] S39.969G       Past Medical History:        Diagnosis Date    Acute on chronic diastolic (congestive) heart failure ([facility]     Ambulatory dysfunction 4/30/2019    Hx severe lumbar psinal stenosis, severe at L4/5 level-MR lumbar spine, 2018    Benign prostate hyperplasia     Blood transfusion     CAD (coronary artery disease)     Cataract     CHF (congestive heart failure) ([facility])     Chronic idiopathic constipation     Diabetes mellitus ([facility])     Dysphagia     History of carotid endarterectomy 4/30/2019    Right CEA    History of left common carotid artery stent placement 4/30/2019    Hyperlipidemia     Hypertension     Parkinson's disease ([facility]     Spinal stenosis, lumbar        Past Surgical History:        Procedure Laterality Date    ABDOMINAL AORTIC ANEURYSM REPAIR, ENDOVASCULAR  1 year ago    2 stents    BACK SURGERY      CARDIAC SURGERY  2000    2 vesselCABG    CAROTID ENDARTERECTOMY  rt and left stent in rt    Bilateral    CATARACT REMOVAL WITH IMPLANT Right 04/08/14    CATARACT REMOVAL WITH IMPLANT Left 9 23 14    COLONOSCOPY      ENDOSCOPY, COLON, DIAGNOSTIC      TONSILLECTOMY      UPPER GASTROINTESTINAL ENDOSCOPY         Social History:    Social History     Tobacco Use    Smoking status: Former Smoker    Smokeless tobacco: Never Used    Tobacco comment: quit smoking 15 yrs ago   Substance Use Topics    Alcohol use: No                                Counseling given: Not Answered  Comment: quit smoking 15 yrs ago      Vital Signs (Current): There were no vitals filed for this visit. BP Readings from Last 3 Encounters:   11/12/21 (!) 129/58   10/18/21 (!) 189/69   09/27/21 134/67       NPO Status:                                                                                 BMI:   Wt Readings from Last 3 Encounters:   11/11/21 190 lb (86.2 kg)   09/27/21 170 lb (77.1 kg)   08/04/21 170 lb (77.1 kg)     There is no height or weight on file to calculate BMI.    CBC:   Lab Results   Component Value Date    WBC 8.5 11/11/2021    RBC 2.97 11/11/2021    HGB 10.0 11/11/2021    HCT 31.2 11/11/2021    .1 11/11/2021    RDW 13.0 11/11/2021     11/11/2021       CMP:   Lab Results   Component Value Date     11/11/2021    K 4.4 11/11/2021    K 4.7 10/18/2021     11/11/2021    CO2 23 11/11/2021    BUN 24 11/11/2021    CREATININE 1.1 11/11/2021    GFRAA >60 11/11/2021    LABGLOM >60 11/11/2021    GLUCOSE 102 11/11/2021    GLUCOSE 113 12/08/2011    PROT 6.1 11/11/2021    CALCIUM 8.3 11/11/2021    BILITOT 0.6 11/11/2021    ALKPHOS 103 11/11/2021    AST 20 11/11/2021    ALT 10 11/11/2021       POC Tests: No results for input(s): POCGLU, POCNA, POCK, POCCL, POCBUN, POCHEMO, POCHCT in the last 72 hours.     Coags:   Lab Results   Component Value Date    PROTIME 11.6 11/11/2021    INR 1.0 11/11/2021    APTT 28.3 11/11/2021       HCG (If Applicable): No results found for: PREGTESTUR, PREGSERUM, HCG, HCGQUANT     ABGs: No results found for: PHART, PO2ART, FAM3CPY, MAU0HDX, BEART, T3GZTPUV     Type & Screen (If Applicable):  No results found for: LABABO, LABRH    Drug/Infectious Status (If Applicable):  No results found for: HIV, HEPCAB    COVID-19 Screening (If Applicable):   Lab Results   Component Value Date    COVID19 Not Detected 09/17/2021    COVID19 Not Detected 05/13/2021       Narrative   EXAMINATION:   5 XRAY VIEWS OF THE LEFT FEMUR       11/11/2021 2:56 am       COMPARISON:   01/31/2012       HISTORY:   ORDERING SYSTEM PROVIDED HISTORY: Pain   TECHNOLOGIST PROVIDED HISTORY:   Reason for exam:->Pain       FINDINGS:   Nondisplaced intertrochanteric fracture of the left femur.  Osteopenia.  Mild   to moderate degenerative changes of the hip joint.  Probable moderate   degenerative changes of the knee.  Scattered vascular calcifications.  No   dislocation.  No other fracture identified. Anesthesia Evaluation  Patient summary reviewed and Nursing notes reviewed no history of anesthetic complications:   Airway: Mallampati: Unable to assess / NA  TM distance: >3 FB   Neck ROM: full  Mouth opening: > = 3 FB Dental:          Pulmonary: breath sounds clear to auscultation      (-) not a current smoker                           Cardiovascular:  Exercise tolerance: good (>4 METS),   (+) hypertension: severe, CAD:, CHF (Congestive heart failure due to cardiomyopathy ): diastolic, hyperlipidemia      ECG reviewed  Rhythm: regular  Rate: normal  Echocardiogram reviewed         Beta Blocker:  Dose within 24 Hrs      ROS comment: Normal sinus rhythm 72, Left axis deviation  Abnormal ECG    ECHO 6/11/21:  Normal left ventricular chamber size. Normal left ventricular systolic function, LVEF is 65%. Mild left ventricular concentric hypertrophy noted. stage I diastolic dysfunction. Left atrium is mildly enlarged.   Increased left atrial volume. Interatrial septum not well visualized but appears intact. Right ventricle is mildly dilated with normal function. Right atrium is mildly enlarged. There is trace mitral regurgitation. No mitral valve prolapse. The aortic valve appears mildly sclerotic with focal areas of calcification. No hemodynamically significant aortic stenosis. There is trace aortic regurgitation. There is trace tricuspid regurgitation, RVSP 26mmHg. Normal aortic root size. No evidence of pericardial effusion. No intra cardiac mass or thrombus. Neuro/Psych:   (+) neuromuscular disease: Parkinson's disease, depression/anxiety             GI/Hepatic/Renal:   (+) renal disease (Stage II CKD:  24/1.1 w/ GFR >60): CRI,          ROS comment: Dysphagia    BPH. Endo/Other:    (+) DiabetesType II DM, using insulin, blood dyscrasia (Plavix LD 8/2/21?; 10/31.2 with plt. of 114K  ): anticoagulation therapy and anemia, arthritis (OA, DJD, DDD): OA., . Pt had no PAT visit        ROS comment: Ambulatory dysfunction     Hx severe lumbar spinal stenosis, severe at L4/5 level - MRI lumbar spine, 2018  Abdominal:             Vascular:           ROS comment: Carotid stenosis s/p R CEA with history of left common carotid artery stent placement. Other Findings: Bad teeth. Anesthesia Plan      spinal     ASA 3     (Pt/INR/aPTT = 11.6/1.0/28.3  )  Induction: intravenous. MIPS: Postoperative opioids intended and Prophylactic antiemetics administered. Plan discussed with CRNA. Attending anesthesiologist reviewed and agrees with Preprocedure content              Aretha Pierre MD   11/12/2021      History, data, and pertinent studies from chart review. Above represents information available via the shared medical record including previous anesthetic, medication, and allergy history.   Confirmation of above and final disposition per DOS anesthesiologist.    Aretha Pierre MD  Staff Anesthesiologist  November 12, 2021  10:42 AM

## 2021-11-12 NOTE — PROGRESS NOTES
Department of Orthopedic Surgery  Resident Progress Note    Patient seen and examined. Plan for OR today for left CMN. Patient expresses some concerns about the surgery, however I did explain and the risk and benefits of operative versus nonoperative treatment and he did ultimately agree to operative treatment. Denies any numbness, tingling, weakness, denies any chest pain or shortness of breath.     VITALS:  BP (!) 132/59   Pulse 82   Temp 98.4 °F (36.9 °C) (Temporal)   Resp 18   Ht 5' 7\" (1.702 m)   Wt 190 lb (86.2 kg)   SpO2 92%   BMI 29.76 kg/m²     General: Alert and oriented but again appears confused    MUSCULOSKELETAL:   left lower extremity:  · Compartments soft and compressible  · +PF/DF/EHL  · +2/4 DP & PT pulses, Brisk Cap refill, Toes warm and perfused  · Distal sensation grossly intact to Peroneals, Sural, Saphenous, and tibial nrs    CBC:   Lab Results   Component Value Date    WBC 8.5 11/11/2021    HGB 10.0 11/11/2021    HCT 31.2 11/11/2021     11/11/2021     PT/INR:    Lab Results   Component Value Date    PROTIME 11.6 11/11/2021    INR 1.0 11/11/2021           ASSESSMENT  Left intertrochanteric hip fracture  PLAN      · Plan for OR today with Dr. Tiffany Bernal, left cephalomedullary nail  · N.p.o. today  · Hold anticoagulants  · Patient has been cleared by medicine for surgery    Satya Vasquez DO

## 2021-11-12 NOTE — OP NOTE
Operative Note      Patient: Sumi Falk  YOB: 1931  MRN: [id number]    Date of Procedure: 11/12/2021    Pre-Op Diagnosis: LEFT INTERTROCHANTERIC FRACTURE    Post-Op Diagnosis: Same       Procedure(s):  LEFT IM NAIL PANKAJ INSERTION (JULIA)    Surgeon(s): Amina Maravilla DO    Assistant:   Resident: Aris Gonzales DO; Feli Waddell DO; Gisell Macias DO    Anesthesia: General    Estimated Blood Loss (mL): 16LB    Complications: None    Specimens:   * No specimens in log *    Implants:  Implant Name Type Inv. Item Serial No.  Lot No. LRB No. Used Action   NAIL IM L170MM [id number] 125DEG TROCHANTERIC FEM TI JEANETTE REBA  NAIL IM L170MM [id number] 125DEG TROCHANTERIC FEM TI JEANETTE REBA  [employer] [facility] [id number] Left 1 Implanted   SCREW BNE L37.5MM DIA5MM JOHN TI REBA FULL THRD SHFT FOR T2  SCREW BNE L37.5MM DIA5MM JOHN TI REBA FULL THRD SHFT FOR T2  [employer] [facility] [id number] Left 1 Implanted   SCREW BNE L105MM DIA10.5MM CANC HIP TI ST JEANETTE REBA  SCREW BNE L105MM DIA10.5MM CANC HIP TI ST JEANETTE REBA  [employer] [facility] [id number] Left 1 Implanted   NAIL IM L170MM [id number] 125DEG TROCHANTERIC FEM TI JEANETTE REBA  NAIL IM L170MM [id number] 125DEG TROCHANTERIC FEM TI JEANETTE REBA  [employer] [facility] [id number] Left 1 Implanted       IMPLANTS: Sumner gamma CMN 10mm 125 degree 170mm short nail, 105mm lag screw, 37.5 distal interlock screw    HISTORY: The patient is a 80y.o. year old male with history of above preop diagnosis. I explained the risk, benefits, expected outcome, and alternatives to the procedure. Patient understands and is in agreement. OPERATIVE COURSE: The patient was seen and identified outside the operative suite, in which the operative site was marked as appropriate by patient, surgeon, staff, and anesthesia. After intubation, patient was placed on the fracture table. All bony prominences and neurovascular structures were well padded and protexted.   Patient's well leg was flexed, abducted, and secured into a well-padded leg well. The operative leg was placed in gentle inline traction. After being positioned, a time out was performed indicating the appropriate identification of the patient, the procedure to be performed, and the side to be performed upon. This was agreed upon by all individuals in the room. Traction and reduction maneuver were applied to the operative leg. Fluoroscopy was brought in showing a near-anatomic reduction of the intertrochanteric hip fracture. The affected lower extremity was then sterilely prepped and draped in a standard fashion. At this point in time, a small incision proximal to the greater trochanter was used with proper hemostasis, and the guide pin for the cephalomedullary nail was then inserted percutaneously under fluoroscopic guidance in the appropriate position in the proximal femur. This was checked under both the AP and lateral.     After this was deemed to be in the appropriate position. The entry reamer was then used to access the proximal femur making sure not to disrupt the abductors. A 10-mm, 125 degree, short nail was chosen. The nail was then placed into position under fluoroscopic guidance. The trochar for the lag screw was then placed. An incision was made laterally to allow access with the jig. The guidewire was then inserted under fluoroscopic guidance. Once in an appropriate position, the length of the lag screw was then reamed appropriately. The lag screw was then placed. The top of the nail was then locked, after first compressing the fracture. Fluoroscopy was used showing the screw in good position with an acceptable tip-to-apex distance. At this point attention was turned to the distal aspect of the device were a 37.5-mm cortical was placed with use of trochar. Final x-rays were then conducted demonstrating acceptable alignment and fixation.     X-rays were also taken of the femur, both AP and lateral, that showed the nail was not abutting the anterior cortex and was of an appropriate length and all hardware in satisfactory position. The incisions were then copiously irrigated with sterile normal saline. The deep tissues were closed with #0 Vicryl in a watertight fashion. The subcutaneous tissues and skin were closed with 2-0 Vicryl in an interrupted fashion. Skin was closed with staples. The incision sites were then dressed in a sterile manner. The patient's compartments were soft and compressible at the end of this portion of the procedure. The patient then had her legs removed from all devices. The patient was moved safely back to a hospital room bed, by multiple  individuals. The patient was taken to the PACU in stable condition. Dr. Farheen De Leon was present for the entirety of the procedure. DISPOSITION: The patient was taken to PACU in stable condition. Once stable, the patient will be transferred to the floor. Orders have been provided to begin physical therapy, weight bear as tolerated Left lower extremity. Patient received a dose of ancef preoperatively. We will continue this for 24 hours postoperatively for infection prophylaxis. The patient will also be resumed on ASA and plavix for DVT prophylaxis. We have consulted  and case management for discharge planning and consulted the PCP for medical management. I agree with above. I was present and performed all critical aspects of the procedure.         Electronically signed by Marlin Chauhan DO on 11/12/2021 at 2:25 PM

## 2021-11-13 NOTE — PROGRESS NOTES
Physical Therapy Initial Evaluation/Plan of Care    Room #:  0320/0320-01  Patient Name: Doris Mcelroy  YOB: 1931  MRN: [id number]    Date of Service: 11/13/2021     Tentative placement recommendation: Subacute rehab  Equipment recommendation: To be determined      Evaluating Physical Therapist: Chaitanya Telles, PT #[id number]      Specific Provider Orders/Date/Referring Provider :  11/12/21 1615   PT eval and treat Start: 11/12/21 1615, End: 11/12/21 1615, ONE TIME, Standing Count: 1 Occurrences, R    Frederic Dorman DO      Admitting Diagnosis:   Closed fracture of femur, intertrochanteric, left, initial encounter ([facility] Eileen Foy     left hip fracture after a fall    Surgery:Date of Procedure: 11/12/2021     Procedure(s):   LEFT IM NAIL PANKAJ INSERTION (JULIA)   Surgeon(s):    Glen Garcia DO    Visit Diagnoses       Codes    Closed nondisplaced intertrochanteric fracture of left femur, initial encounter [facility])     S72.145A          Patient Active Problem List   Diagnosis    Right cataract    Left cataract    Lumbar stenosis    DDD (degenerative disc disease), lumbar    History of carotid endarterectomy    History of left common carotid artery stent placement    Ambulatory dysfunction    Acute respiratory failure with hypoxia (HCC)    Acute CHF (congestive heart failure) ([facility])    Congestive heart failure due to cardiomyopathy ([facility]    Acute diastolic heart failure ([facility]    Type 2 diabetes mellitus with circulatory disorder, with long-term current use of insulin ([facility])    Stage 2 chronic kidney disease    Closed fracture of femur, intertrochanteric, left, initial encounter ([facility]        ASSESSMENT of Current Deficits Patient exhibits decreased strength, balance, endurance, range of motion and pain left hip impairing functional mobility, transfers, gait , gait distance, tolerance to activity and participation history of parkinson's disease with tremors, constant cueing throughout for participation and eyes open. Marked pain and weakness limiting functional mobility. Patient requires continued skilled physical therapy to address concerns listed above for increased safety and function at discharge. PHYSICAL THERAPY  PLAN OF CARE       Physical therapy plan of care is established based on physician order,  patient diagnosis and clinical assessment    Current Treatment Recommendations:    -Bed Mobility: Lower extremity exercises  and Trunk control activities   -Sitting Balance: Hands on support to maintain midline , Facilitate active trunk muscle engagement  and Facilitate postural control in all planes   -Standing Balance: Perform strengthening exercises in standing to promote motor control with or without upper extremity support  and Instruct patient on adequate base of support to maintain balance  -Transfers: Provide instruction on proper hand and foot position for adequate transfer of weight onto lower extremities and use of gait device if needed, Cues for hand placement, technique and safety. Provide stabilization to prevent fall  and Assist with extension of knees trunk and hip to accept weight transfer   -Gait: Gait training, Standing activities to improve: base of support, weight shift, weight bearing  and Pregait training to emphasize: Sequencing , Base of support, Step to gait pattern, Device control, Upright and Safety   -Endurance: Utilize Supervised activities to increase level of endurance to allow for safe functional mobility including transfers and gait     PT long term treatment goals are located in below grid    Patient and or family understand(s) diagnosis, prognosis, and plan of care. Frequency of treatments: Patient will be seen  twice daily.          Prior Level of Function: Patient ambulated with rollator    Rehab Potential: fair    for baseline    Past medical history:   Past Medical History:   Diagnosis Date    Acute on chronic diastolic (congestive) heart failure ([facility]     Ambulatory dysfunction 4/30/2019    Hx severe lumbar psinal stenosis, severe at L4/5 level-MR lumbar spine, 2018    Benign prostate hyperplasia     Blood transfusion     CAD (coronary artery disease)     Cataract     CHF (congestive heart failure) (HCC)     Chronic idiopathic constipation     Diabetes mellitus ([facility]     Dysphagia     History of carotid endarterectomy 4/30/2019    Right CEA    History of left common carotid artery stent placement 4/30/2019    Hyperlipidemia     Hypertension     Parkinson's disease ([facility]     Spinal stenosis, lumbar      Past Surgical History:   Procedure Laterality Date    ABDOMINAL AORTIC ANEURYSM REPAIR, ENDOVASCULAR  1 year ago    2 stents    BACK SURGERY      CARDIAC SURGERY  2000    2 vesselCABG    CAROTID ENDARTERECTOMY  rt and left stent in rt    Bilateral    CATARACT REMOVAL WITH IMPLANT Right 04/08/14    CATARACT REMOVAL WITH IMPLANT Left 9 23 14    COLONOSCOPY      ENDOSCOPY, COLON, DIAGNOSTIC      TONSILLECTOMY      UPPER GASTROINTESTINAL ENDOSCOPY         SUBJECTIVE:    Precautions:  Up with assistance, falls, alarm and full weight bearing   Left lower extremity  ,  Parkinson's disease    Social history: Patient lives with spouse in [address] in [employer] left    Equipment owned: Rollator and Shower chair,       [address] [facility]   How much difficulty turning over in bed?: A Lot  How much difficulty sitting down on / standing up from a chair with arms?: Unable  How much difficulty moving from lying on back to sitting on side of bed?: A Lot  How much help from another person moving to and from a bed to a chair?: Total  How much help from another person needed to walk in hospital room?: Total  How much help from another person for climbing 3-5 steps with a railing?: Total  -PAC Inpatient Mobility Raw Score : 8  AM-PAC Inpatient T-Scale Score : 28.52  Mobility Inpatient CMS 0-100% Score: 86.62  Mobility Inpatient CMS G-Code Modifier : CM    Nursing cleared patient for PT evaluation. The admitting diagnosis and active problem list as listed above have been reviewed prior to the initiation of this evaluation. OBJECTIVE;   Initial Evaluation  Date: 11/13/2021 Treatment Date:     Short Term/ Long Term   Goals   Was pt agreeable to Eval/treatment? Yes  To be met in 3 days   Pain level   Unable to state number, marked pain with activity patient moaning        Bed Mobility    Rolling: Maximal assist of 1    Supine to sit: Maximal assist of 1    Sit to supine: Dependent assist of 1    Scooting: Maximal assist of 1    Rolling: Moderate assist of 1    Supine to sit: Moderate assist of 1    Sit to supine: Moderate assist of 1    Scooting: Moderate assist of 1     Transfers Sit to stand: Not assessed  unsafe at this time  Sit to stand:  Moderate assist of 1     Ambulation    not assessed    10 feet using  wheeled walker with Moderate assist of 1    Stair negotiation: ascended and descended   Not assessed          ROM Within functional limits    Increase range of motion 10% of affected joints    Strength BUE:  refer to OT eval  RLE:  3-/5  LLE:  2/5  Increase strength in affected mm groups by 1/3 grade   Balance Sitting EOB:  poor posterior lean max progressing to short period of minimal assist back to max assist as patient fatigued  Dynamic Standing:  not assessed    Sitting EOB:  fair +  Dynamic Standing: fair - wheeled walker      Patient is Alert & Oriented x person, place, time and situation and follows one step directions  25%  Sensation:  Patient  denies numbness/tingling   Edema:  yes left lower extremity   Endurance: fair  -    Vitals: 4 liters nasal cannula   Blood Pressure at rest  Blood Pressure during session    Heart Rate at rest 82 Heart Rate after session 65   SPO2 at rest 92%  SPO2 after session 90%     Patient education  Patient educated on role of Physical Therapy, risks of immobility, safety and plan of care,  importance of mobility while in hospital , ankle pumps, quad set and glut set for edema control, blood clot prevention and positioning for skin integrity and comfort     Patient response to education:   Pt verbalized understanding Pt demonstrated skill Pt requires further education in this area   No Partial Yes      Treatment:  Patient practiced and was instructed/facilitated in the following treatment: Patient performed exercises, required assist throughout, assisted to edge of bed,   Sat edge of bed 15 minutes with Maximal assist of 1 to increase dynamic sitting balance and activity tolerance. progressing to short period of minimal assist then back to max assist as patient fatigued. Returned to bed. Dependent of  2 to scoot to Head of bed in trendelenburg       Therapeutic Exercises:  ankle pumps, heel slide, hip abduction/adduction and straight leg raise  x 15 reps. At end of session, patient in bed with alarm call light and phone within reach,  all lines and tubes intact, nursing notified. Patient would benefit from continued skilled Physical Therapy to improve functional independence and quality of life. Patient's/ family goals   none stated    Time in  0745  Time out  0820    Total Treatment Time  15 minutes    Evaluation time includes thorough review of current medical information, gathering information on past medical history/social history and prior level of function, completion of standardized testing/informal observation of tasks, assessment of data, and development of Plan of care and goals.      CPT codes:  Low Complexity PT evaluation (55742)  Therapeutic activities (13087)   15 minutes  1 unit(s)    Raina Yarbrough, PT

## 2021-11-13 NOTE — PROGRESS NOTES
Progress Note  Date:2021       Room:[facility]/0320-01  Patient Name:Rigo Blevins     YOB: 1931     Age:89 y.o. Subjective    Subjective:  Symptoms:  Stable. He reports shortness of breath, malaise, weakness and anxiety. No cough, chest pain, headache, chest pressure, anorexia or diarrhea. Diet:  NPO. Activity level: Impaired due to weakness. Pain:  He complains of pain that is moderate. sp surgery. Confused, sob  Review of Systems   Respiratory: Positive for shortness of breath. Negative for cough. Cardiovascular: Negative for chest pain. Gastrointestinal: Negative for anorexia and diarrhea. Neurological: Positive for weakness. Objective         Vitals Last 24 Hours:  TEMPERATURE:  Temp  Av.6 °F (37 °C)  Min: 97.4 °F (36.3 °C)  Max: 100.6 °F (38.1 °C)  RESPIRATIONS RANGE: Resp  Av.3  Min: 12  Max: 28  PULSE OXIMETRY RANGE: SpO2  Av %  Min: 68 %  Max: 100 %  PULSE RANGE: Pulse  Av.9  Min: 80  Max: 95  BLOOD PRESSURE RANGE: Systolic (92HEW), XMJ:545 , Min:95 , QYJ:535   ; Diastolic (97MBC), MXS:46, Min:33, Max:97    I/O (24Hr): Intake/Output Summary (Last 24 hours) at 2021 2248  Last data filed at 2021 1842  Gross per 24 hour   Intake 600 ml   Output 1860 ml   Net -1260 ml     Objective:  General Appearance:  Comfortable. Vital signs: (most recent): Blood pressure 121/74, pulse 95, temperature 97.4 °F (36.3 °C), temperature source Temporal, resp. rate 17, height 5' 7\" (1.702 m), weight 190 lb (86.2 kg), SpO2 92 %. Vital signs are normal.    Output: Producing urine. Lungs:  Normal respiratory rate. He is not in respiratory distress. There are rales. Heart: Regular rhythm. S1 normal and S2 normal.    Abdomen: Abdomen is soft. Bowel sounds are normal.   There is no abdominal tenderness. Extremities: Normal range of motion. Pulses: Distal pulses are intact.     Neurological: Patient is alert and oriented to person, place and time. Normal strength. Pupils:  Pupils are equal, round, and reactive to light. Skin:  Warm and dry. Labs/Imaging/Diagnostics    Labs:  CBC:  Recent Labs     11/11/21 0441   WBC 8.5   RBC 2.97*   HGB 10.0*   HCT 31.2*   .1*   RDW 13.0   *     CHEMISTRIES:  Recent Labs     11/11/21 0441      K 4.4      CO2 23   BUN 24*   CREATININE 1.1   GLUCOSE 102*     PT/INR:  Recent Labs     11/11/21 0441   PROTIME 11.6   INR 1.0     APTT:  Recent Labs     11/11/21 0441   APTT 28.3     LIVER PROFILE:  Recent Labs     11/11/21 0441   AST 20   ALT 10   BILITOT 0.6   ALKPHOS 103       Imaging Last 24 Hours:  XR HIP LEFT (2-3 VIEWS)    Result Date: 11/12/2021  EXAMINATION: TWO XRAY VIEWS OF THE LEFT HIP 11/12/2021 4:32 pm COMPARISON: Prior radiograph from November 11 HISTORY: [facility]: please include all hardware TECHNOLOGIST PROVIDED HISTORY: Reason for exam:->please include all hardware FINDINGS: Status post internal fixation of the left hip. Alignment is anatomic. Right hip is intact. Pubic rami are within normal limits. Sacrum within normal limits. Degenerative changes of the lower thoracic spine. Urinary catheter in place. Surgical changes overlying the left hip. Status post internal fixation of the left hip with anatomic alignment. XR HIP LEFT (2-3 VIEWS)    Result Date: 11/11/2021  EXAMINATION: TWO XRAY VIEWS OF THE LEFT HIP +2 AP VIEWS OF THE PELVIS 11/11/2021 2:56 am COMPARISON: 01/31/2012 HISTORY: ORDERING SYSTEM PROVIDED HISTORY: fall, pain TECHNOLOGIST PROVIDED HISTORY: Reason for exam:->fall, pain FINDINGS: Obliquely oriented nondisplaced intertrochanteric fracture of the left femur. Moderate degenerative changes of the lower lumbar spine, pelvis and hips. Scattered vascular calcifications. Mild osteopenia. No dislocation, bony destruction or other fracture. Intertrochanteric left hip fracture.      XR FEMUR LEFT (MIN 2 VIEWS)    Result Date: 11/11/2021  EXAMINATION: 5 XRAY VIEWS OF THE LEFT FEMUR 11/11/2021 2:56 am COMPARISON: 01/31/2012 HISTORY: ORDERING SYSTEM PROVIDED HISTORY: Pain TECHNOLOGIST PROVIDED HISTORY: Reason for exam:->Pain FINDINGS: Nondisplaced intertrochanteric fracture of the left femur. Osteopenia. Mild to moderate degenerative changes of the hip joint. Probable moderate degenerative changes of the knee. Scattered vascular calcifications. No dislocation. No other fracture identified. Intertrochanteric fracture. CT HEAD WO CONTRAST    Result Date: 11/11/2021  EXAMINATION: CT OF THE HEAD WITHOUT CONTRAST  11/11/2021 2:38 am TECHNIQUE: CT of the head was performed without the administration of intravenous contrast. Dose modulation, iterative reconstruction, and/or weight based adjustment of the mA/kV was utilized to reduce the radiation dose to as low as reasonably achievable. COMPARISON: 10/18/2021 HISTORY: ORDERING SYSTEM PROVIDED HISTORY: fall, head injury TECHNOLOGIST PROVIDED HISTORY: Reason for exam:->fall, head injury Has a \"code stroke\" or \"stroke alert\" been called? ->No Decision Support Exception - unselect if not a suspected or confirmed emergency medical condition->Emergency Medical Condition (MA) FINDINGS: No intracranial hemorrhage, mass effect or midline shift. Basal cisterns are patent. Ventricles are not enlarged. Cortical volume loss. Areas of decreased attenuation in the bilateral white matter are nonspecific but likely due to chronic microvascular ischemia. Vascular calcifications. Mild mucosal thickening in the ethmoid sinuses. There is also minimal polypoid mucosal thickening in the maxillary sinuses. These findings are similar to previous. Mandibular condyles are slightly subluxed inferiorly but unchanged from previous and favored to be positional but could be correlated clinically. No acute intracranial findings.   Consider MRI if symptoms persist. Volume loss and findings suggestive of chronic microvascular ischemia. CT CERVICAL SPINE WO CONTRAST    Result Date: 11/11/2021  EXAMINATION: CT OF THE CERVICAL SPINE WITHOUT CONTRAST 11/11/2021 2:38 am TECHNIQUE: CT of the cervical spine was performed without the administration of intravenous contrast. Multiplanar reformatted images are provided for review. Dose modulation, iterative reconstruction, and/or weight based adjustment of the mA/kV was utilized to reduce the radiation dose to as low as reasonably achievable. COMPARISON: 10/18/2021 HISTORY: ORDERING SYSTEM PROVIDED HISTORY: fall TECHNOLOGIST PROVIDED HISTORY: Reason for exam:->fall Decision Support Exception - unselect if not a suspected or confirmed emergency medical condition->Emergency Medical Condition (MA) FINDINGS: No prevertebral edema. Trace grade 1 anterior subluxation of C4 on C5. Mild depression of the superior endplate of the T1 and T3 vertebral bodies. The T1 finding is unchanged from previous. T3 was not entirely included in the field of view but the visualized portion appears unchanged. This is favored to be chronic. Fairly advanced multilevel degenerative disc disease changes most evident in the mid to lower cervical spine. Advanced bilateral facet arthritis. Ligamentous nuchal calcifications. Moderate multilevel uncovertebral degenerative changes. The hypertrophic changes contribute to at least mild-moderate multilevel canal and foraminal narrowing. Some images are mildly degraded by patient motion but allowing for this, no distinct acute fracture identified. No focal fluid collection. There is a stent in the right internal carotid artery. Multiple surgical clips on both sides of the neck. Scattered vascular calcifications. Findings which are thought to be chronic as detailed above.      Fluoro For Surgical Procedures    Result Date: 11/12/2021  EXAMINATION: SPOT FLUOROSCOPIC IMAGES 11/12/2021 2:28 pm TECHNIQUE: Fluoroscopy was provided by the radiology department for procedure. Radiologist was not present during examination. FLUOROSCOPY DOSE AND TYPE OR TIME AND EXPOSURES: 71 seconds, 14.3 mGy COMPARISON: None HISTORY: ORDERING SYSTEM PROVIDED HISTORY: Closed nondisplaced intertrochanteric fracture of left femur, initial encounter [facility]) TECHNOLOGIST PROVIDED HISTORY: Reason for exam:->im nailing left femur Intraprocedural imaging. FINDINGS: 6 spot images of the left hip were obtained. Intraprocedural fluoroscopic spot images as above. See separate procedure report for more information. Assessment//Plan           Hospital Problems           Last Modified POA    Closed fracture of femur, intertrochanteric, left, initial encounter ([address] 11/11/2021 Yes        Assessment:  (Problem List as of 11/12/2021 Reviewed: 7/1/2015 11:06 AM by Wilber Narvaez DO    History of carotid endarterectomy    History of left common carotid artery stent placement    Ambulatory dysfunction    Right cataract    Left cataract    Lumbar stenosis    DDD (degenerative disc disease), lumbar    Acute respiratory failure with hypoxia (HCC)    Acute CHF (congestive heart failure) (HCC)    Congestive heart failure due to cardiomyopathy ([address]    Acute diastolic heart failure ([address]    Type 2 diabetes mellitus with circulatory disorder, with long-term   current use of insulin (HCC)    Stage 2 chronic kidney disease    Closed fracture of femur, intertrochanteric, left, initial encounter   ([address]    ). Plan:   (Monitor h/h  duoneb. ).        Electronically signed by Franz Brittle, MD on 11/12/21 at 10:48 PM EST

## 2021-11-13 NOTE — PROGRESS NOTES
Department of Orthopedic Surgery  Resident Progress Note    Patient seen and examined. Sitting up in bed with assist with PT this morning. He is complaining of some left hip pain. Denies any numbness, tingling, weakness.     VITALS:  /69   Pulse 79   Temp 97.4 °F (36.3 °C) (Temporal)   Resp 20   Ht 5' 7\" (1.702 m)   Wt 190 lb (86.2 kg)   SpO2 93% Comment: repored low SpO2 of 89% to nurse Kristal rechecked went up to 93  BMI 29.76 kg/m²     General: Awake and alert, confused    MUSCULOSKELETAL:   left lower extremity:  · Dressing C/D/I, small amount of bloody drainage on the inferior bandage  · Compartments soft and compressible  · +PF/DF/EHL  · +2/4 DP & PT pulses, Brisk Cap refill, Toes warm and perfused  · Distal sensation grossly intact to Peroneals, Sural, Saphenous, and tibial nrs    CBC:   Lab Results   Component Value Date    WBC 6.8 11/13/2021    HGB 9.0 11/13/2021    HCT 28.3 11/13/2021    PLT 96 11/13/2021     PT/INR:    Lab Results   Component Value Date    PROTIME 11.6 11/11/2021    INR 1.0 11/11/2021           ASSESSMENT  · Left intertrochanteric hip fracture   · S/P left cephalomedullary nail (Stites gamma nail) on 11/12-postoperative day #1    PLAN      · Continue physical therapy and protocol: WBAT - LLE  · 24 hour abx coverage  · Deep venous thrombosis prophylaxis - , early mobilization  · PT/OT  · Pain Control: IV and PO  · Monitor H&H  · D/C Plan: DC to SNF when medically stable

## 2021-11-13 NOTE — PLAN OF CARE
Problem: Pain:  Goal: Pain level will decrease  Description: Pain level will decrease  Outcome: Met This Shift     Problem: Pain:  Goal: Control of acute pain  Description: Control of acute pain  Outcome: Met This Shift     Problem: Skin Integrity:  Goal: Will show no infection signs and symptoms  Description: Will show no infection signs and symptoms  Outcome: Met This Shift     Problem: Skin Integrity:  Goal: Absence of new skin breakdown  Description: Absence of new skin breakdown  Outcome: Met This Shift     Problem: Mental Status - Impaired:  Goal: Mental status will improve  Description: Mental status will improve  Outcome: Met This Shift     Problem: Mobility - Impaired:  Goal: Mobility will improve  Description: Mobility will improve  Outcome: Met This Shift

## 2021-11-14 NOTE — PROGRESS NOTES
Physical Therapy        0320/0320-01    Patient unavailable for physical therapy treatment due to not appropriate at this time. Attempted session, however patient began yelling out \"help\" and tremors increased while in bed. Patient attempted to place R LE out of bed and pulled off his gown. Patient has telesitter, bed alarm activated and nursing aware of patient's actions. Patient educated on safety to prevent falling. Will attempt session at later time/date when patient is more appropriate.      Marlen Alonzo, PTA  #[id number]

## 2021-11-14 NOTE — PROGRESS NOTES
Progress Note  Date:2021       Room:0320/0320-01  Patient Name:Rigo Wing     YOB: 1931     Age:89 y.o. Subjective    Subjective:  Symptoms:  Stable. He reports shortness of breath, malaise, weakness, anorexia and anxiety. No cough, chest pain, headache or chest pressure. Diet:  Adequate intake. Activity level: Impaired due to weakness. Pain:  He complains of pain that is moderate. pt is doing better. Sp surgery  Review of Systems   Respiratory: Positive for shortness of breath. Negative for cough. Cardiovascular: Negative for chest pain. Gastrointestinal: Positive for anorexia. Neurological: Positive for weakness. Objective         Vitals Last 24 Hours:  TEMPERATURE:  Temp  Av.8 °F (37.1 °C)  Min: 97.4 °F (36.3 °C)  Max: 99.6 °F (37.6 °C)  RESPIRATIONS RANGE: Resp  Av  Min: 16  Max: 20  PULSE OXIMETRY RANGE: SpO2  Av.8 %  Min: 90 %  Max: 93 %  PULSE RANGE: Pulse  Av.3  Min: 71  Max: 88  BLOOD PRESSURE RANGE: Systolic (90OXT), CMY:671 , Min:111 , PFX:532   ; Diastolic (04LJC), PQX:51, Min:42, Max:69    I/O (24Hr): Intake/Output Summary (Last 24 hours) at 2021 2350  Last data filed at 2021 2132  Gross per 24 hour   Intake 240 ml   Output 1750 ml   Net -1510 ml     Objective:  General Appearance:  Comfortable. Vital signs: (most recent): Blood pressure (!) 111/56, pulse 75, temperature 98.4 °F (36.9 °C), temperature source Axillary, resp. rate 16, height 5' 7\" (1.702 m), weight 190 lb (86.2 kg), SpO2 91 %. Vital signs are normal.    Output: Producing urine. HEENT: Normal HEENT exam.    Lungs:  Normal respiratory rate. He is not in respiratory distress. No rales. Heart: Normal rate. Regular rhythm. S1 normal and S2 normal.    Abdomen: Abdomen is soft. There is no abdominal tenderness. Extremities: Decreased range of motion. Pulses: Distal pulses are intact. Neurological: Patient is alert.     Pupils: Pupils are equal, round, and reactive to light. Labs/Imaging/Diagnostics    Labs:  CBC:  Recent Labs     11/11/21 0441 11/13/21 0401   WBC 8.5 6.8   RBC 2.97* 2.66*   HGB 10.0* 9.0*   HCT 31.2* 28.3*   .1* 106.4*   RDW 13.0 13.1   * 96*     CHEMISTRIES:  Recent Labs     11/11/21 0441      K 4.4      CO2 23   BUN 24*   CREATININE 1.1   GLUCOSE 102*     PT/INR:  Recent Labs     11/11/21 0441   PROTIME 11.6   INR 1.0     APTT:  Recent Labs     11/11/21 0441   APTT 28.3     LIVER PROFILE:  Recent Labs     11/11/21 0441   AST 20   ALT 10   BILITOT 0.6   ALKPHOS 103       Imaging Last 24 Hours:  XR HIP LEFT (2-3 VIEWS)    Result Date: 11/12/2021  EXAMINATION: TWO XRAY VIEWS OF THE LEFT HIP 11/12/2021 4:32 pm COMPARISON: Prior radiograph from November 11 HISTORY: [address] [facility] [address]: please include all hardware TECHNOLOGIST PROVIDED HISTORY: Reason for exam:->please include all hardware FINDINGS: Status post internal fixation of the left hip. Alignment is anatomic. Right hip is intact. Pubic rami are within normal limits. Sacrum within normal limits. Degenerative changes of the lower thoracic spine. Urinary catheter in place. Surgical changes overlying the left hip. Status post internal fixation of the left hip with anatomic alignment. Fluoro For Surgical Procedures    Result Date: 11/12/2021  EXAMINATION: SPOT FLUOROSCOPIC IMAGES 11/12/2021 2:28 pm TECHNIQUE: Fluoroscopy was provided by the radiology department for procedure. Radiologist was not present during examination. FLUOROSCOPY DOSE AND TYPE OR TIME AND EXPOSURES: 71 seconds, 14.3 mGy COMPARISON: None HISTORY: ORDERING SYSTEM PROVIDED HISTORY: Closed nondisplaced intertrochanteric fracture of left femur, initial encounter [facility]) TECHNOLOGIST PROVIDED HISTORY: Reason for exam:->im nailing left femur Intraprocedural imaging. FINDINGS: 6 spot images of the left hip were obtained.      Intraprocedural fluoroscopic spot images as above. See separate procedure report for more information. Assessment//Plan           Hospital Problems           Last Modified POA    Closed fracture of femur, intertrochanteric, left, initial encounter ([address] 11/11/2021 Yes        Assessment:  (Problem List as of 11/13/2021 Reviewed: 7/1/2015 11:06 AM by Joey Bauer DO    History of carotid endarterectomy    History of left common carotid artery stent placement    Ambulatory dysfunction    Right cataract    Left cataract    Lumbar stenosis    DDD (degenerative disc disease), lumbar    Acute respiratory failure with hypoxia (HCC)    Acute CHF (congestive heart failure) (HCC)    Congestive heart failure due to cardiomyopathy ([address]    Acute diastolic heart failure ([address]    Type 2 diabetes mellitus with circulatory disorder, with long-term   current use of insulin (HCC)    Stage 2 chronic kidney disease    Closed fracture of femur, intertrochanteric, left, initial encounter   ([address]    ). Plan:   (Pt/ot. Monitor oxygen and blood count).        Electronically signed by Maria Esther Ohara MD on 11/13/21 at 11:50 PM EST

## 2021-11-14 NOTE — PROGRESS NOTES
Department of Orthopedic Surgery  Resident Progress Note    Patient seen and examined. Somewhat confused but at baseline per nursing staff. Staff deny any acute events overnight. Patient will respond to name and states he does not have any complaints this morning.     VITALS:  /62   Pulse 78   Temp 97.7 °F (36.5 °C) (Axillary)   Resp 16   Ht 5' 7\" (1.702 m)   Wt 190 lb (86.2 kg)   SpO2 90%   BMI 29.76 kg/m²     General: Awake and alert, confused    MUSCULOSKELETAL:   left lower extremity:  · Dressing C/D/I, small amount of bloody drainage on the inferior bandage  · Compartments soft and compressible  · +PF/DF/EHL  · +2/4 DP & PT pulses, Brisk Cap refill, Toes warm and perfused  · Distal sensation grossly intact to Peroneals, Sural, Saphenous, and tibial nrs    CBC:   Lab Results   Component Value Date    WBC 6.8 11/13/2021    HGB 9.0 11/13/2021    HCT 28.3 11/13/2021    PLT 96 11/13/2021     PT/INR:    Lab Results   Component Value Date    PROTIME 11.6 11/11/2021    INR 1.0 11/11/2021           ASSESSMENT  · Left intertrochanteric hip fracture   · S/P left cephalomedullary nail (Sharron gamma nail) on 11/12-postoperative day #2    PLAN      · Continue physical therapy and protocol: WBAT - LLE  · 24 hour abx coverage, complete  · Deep venous thrombosis prophylaxis - , early mobilization  · PT/OT  · Pain Control: IV and PO  · Monitor H&H- 9.0, asymptomatic  · D/C Plan: DC to SNF when medically stable  · Discussed with attending

## 2021-11-14 NOTE — PLAN OF CARE
Problem: Pain:  Goal: Pain level will decrease  Description: Pain level will decrease  11/13/2021 2139 by Tia Goodson RN  Outcome: Met This Shift  11/13/2021 1050 by Silvana Her RN  Outcome: Met This Shift  Goal: Control of acute pain  Description: Control of acute pain  11/13/2021 2139 by Tia Goodson RN  Outcome: Met This Shift  11/13/2021 1050 by Silvana Her RN  Outcome: Met This Shift  Goal: Control of chronic pain  Description: Control of chronic pain  Outcome: Met This Shift     Problem: Skin Integrity:  Goal: Will show no infection signs and symptoms  Description: Will show no infection signs and symptoms  11/13/2021 2139 by Tia Goodson RN  Outcome: Met This Shift  11/13/2021 1050 by Silvana Her RN  Outcome: Met This Shift  Goal: Absence of new skin breakdown  Description: Absence of new skin breakdown  11/13/2021 2139 by Tia Goodson RN  Outcome: Met This Shift  11/13/2021 1050 by Silvana Her RN  Outcome: Met This Shift     Problem: Mental Status - Impaired:  Goal: Mental status will improve  Description: Mental status will improve  11/13/2021 2139 by Tia Goodson RN  Outcome: Met This Shift  11/13/2021 1050 by Silvana Her RN  Outcome: Met This Shift     Problem: Mobility - Impaired:  Goal: Mobility will improve  Description: Mobility will improve  11/13/2021 2139 by Tia Goodson RN  Outcome: Met This Shift  11/13/2021 1050 by Silvana Her RN  Outcome: Met This Shift     Problem: Fluid Volume:  Goal: Maintenance of adequate hydration will improve  Description: Maintenance of adequate hydration will improve  Outcome: Met This Shift     Problem: Urinary Elimination:  Goal: Ability to recognize the need to void and respond appropriately will improve  Description: Ability to recognize the need to void and respond appropriately will improve  Outcome: Met This Shift  Goal: Will remain free from infection  Description: Will remain free from infection  Outcome: Met This Shift

## 2021-11-15 NOTE — PROGRESS NOTES
participation and eyes open. Posterior lean with seated balance, required max assist initially improved to minimal assist and back to max assist. Able to progress with therapy this session and stand x 2 reps needing max assist flexed trunk and bilateral knees noted. marked pain and weakness. Patient requires continued skilled physical therapy to address concerns listed above for increased safety and function at discharge. PHYSICAL THERAPY  PLAN OF CARE       Physical therapy plan of care is established based on physician order,  patient diagnosis and clinical assessment    Current Treatment Recommendations:    -Bed Mobility: Lower extremity exercises  and Trunk control activities   -Sitting Balance: Hands on support to maintain midline , Facilitate active trunk muscle engagement  and Facilitate postural control in all planes   -Standing Balance: Perform strengthening exercises in standing to promote motor control with or without upper extremity support  and Instruct patient on adequate base of support to maintain balance  -Transfers: Provide instruction on proper hand and foot position for adequate transfer of weight onto lower extremities and use of gait device if needed, Cues for hand placement, technique and safety. Provide stabilization to prevent fall  and Assist with extension of knees trunk and hip to accept weight transfer   -Gait: Gait training, Standing activities to improve: base of support, weight shift, weight bearing  and Pregait training to emphasize: Sequencing , Base of support, Step to gait pattern, Device control, Upright and Safety   -Endurance: Utilize Supervised activities to increase level of endurance to allow for safe functional mobility including transfers and gait     PT long term treatment goals are located in below grid    Patient and or family understand(s) diagnosis, prognosis, and plan of care. Frequency of treatments: Patient will be seen  twice daily.          Prior Level of Function: Patient ambulated with rollator    Rehab Potential: fair    for baseline    Past medical history:   Past Medical History:   Diagnosis Date    Acute on chronic diastolic (congestive) heart failure [facility]     Ambulatory dysfunction 4/30/2019    Hx severe lumbar psinal stenosis, severe at L4/5 level-MR lumbar spine, 2018    Benign prostate hyperplasia     Blood transfusion     CAD (coronary artery disease)     Cataract     CHF (congestive heart failure) (HCC)     Chronic idiopathic constipation     Diabetes mellitus (HCC)     Dysphagia     History of carotid endarterectomy 4/30/2019    Right CEA    History of left common carotid artery stent placement 4/30/2019    Hyperlipidemia     Hypertension     Parkinson's disease ([facility]     Spinal stenosis, lumbar      Past Surgical History:   Procedure Laterality Date    ABDOMINAL AORTIC ANEURYSM REPAIR, ENDOVASCULAR  1 year ago    2 stents    BACK SURGERY      CARDIAC SURGERY  2000    2 vesselCABG    CAROTID ENDARTERECTOMY  rt and left stent in rt    Bilateral    CATARACT REMOVAL WITH IMPLANT Right 04/08/14    CATARACT REMOVAL WITH IMPLANT Left 9 23 14    COLONOSCOPY      ENDOSCOPY, COLON, DIAGNOSTIC      FEMUR FRACTURE SURGERY Left 11/12/2021    LEFT IM NAIL PANKAJ INSERTION (JULIA) performed by Lisbet Dubose DO at [address] ENDOSCOPY         SUBJECTIVE:    Precautions:  Up with assistance, falls, alarm and full weight bearing   Left lower extremity  ,  Parkinson's disease    Social history: Patient lives with spouse in [address] in shower [employer] bars left    Equipment owned: Rollator and Shower chair,       [facility]   How much difficulty turning over in bed?: A Lot  How much difficulty sitting down on / standing up from a chair with arms?: A Lot  How much difficulty moving from lying on back to sitting on side of bed?: A Lot  How much help from another person moving to and from a bed to a chair?: A Lot  How much help from another person needed to walk in hospital room?: Total  How much help from another person for climbing 3-5 steps with a railing?: Total  AM-PAC Inpatient Mobility Raw Score : 10  AM-PAC Inpatient T-Scale Score : 32.29  Mobility Inpatient CMS 0-100% Score: 76.75  Mobility Inpatient CMS G-Code Modifier : CL    Nursing cleared patient for PT treatment. OBJECTIVE;   Initial Evaluation  Date: 11/13/2021 Treatment Date:  11/15/2021     Short Term/ Long Term   Goals   Was pt agreeable to Eval/treatment? Yes yes To be met in 3 days   Pain level   Unable to state number, marked pain with activity patient moaning    Patient states \"ouch\" when moving left lower extremity. Bed Mobility    Rolling: Maximal assist of 1    Supine to sit: Maximal assist of 1    Sit to supine: Dependent assist of 1    Scooting: Maximal assist of 1   Rolling: Maximal assist of 1   Supine to sit: Moderate assist of  2   Sit to supine: Moderate assist of  2   Scooting: Maximal assist of  2    Rolling: Moderate assist of 1    Supine to sit: Moderate assist of 1    Sit to supine: Moderate assist of 1    Scooting: Moderate assist of 1     Transfers Sit to stand: Not assessed  unsafe at this time Sit to stand: Maximal assist of  2     Sit to stand: Moderate assist of 1     Ambulation    not assessed  Patient able to take 2 heel to toe steps with right lower extremity, therapist assisted with weight shifting however unable to advance left lower extremity at this time.      10 feet using  wheeled walker with Moderate assist of 1    Stair negotiation: ascended and descended   Not assessed  Not assessed           ROM Within functional limits    Increase range of motion 10% of affected joints    Strength BUE:  refer to OT eval  RLE:  3-/5  LLE:  2/5  Increase strength in affected mm groups by 1/3 grade   Balance Sitting EOB:  poor posterior lean max progressing to short period of minimal assist back to max assist as patient fatigued  Dynamic Standing:  not assessed   Sitting EOB: poor posterior lean, patient needing maximal assist to  minimal assist however due to fatigued back to max assist  Dynamic Standing: poor flexed trunk and knees. Sitting EOB:  fair +  Dynamic Standing: fair - wheeled walker      Patient is Alert & Oriented x person, place, time and situation and follows one step directions  50%  Sensation:  Patient  denies numbness/tingling   Edema:  yes left lower extremity   Endurance: fair  -    Vitals: 4 liters nasal cannula   Blood Pressure at rest  Blood Pressure during session    Heart Rate at rest 84 Heart Rate after session    SPO2 at rest 90%  SPO2 after session 88-90%     Patient education  Patient educated on role of Physical Therapy, risks of immobility, safety and plan of care,  importance of mobility while in hospital , ankle pumps, quad set and glut set for edema control, blood clot prevention and positioning for skin integrity and comfort     Patient response to education:   Pt verbalized understanding Pt demonstrated skill Pt requires further education in this area   No Partial Yes      Treatment:  Patient practiced and was instructed/facilitated in the following treatment: Patient performed supine exercises. Assisted to edge of bed,   Sat edge of bed 12 minutes with Maximal assist of 1 to increase dynamic sitting balance and activity tolerance. progressing to minimal assist then back to max assist as patient fatigued. Performed seated exercise. Stood x 2. Attempted taking steps to head of bed, however unable. Returned to bed. Dependent of  2 to scoot to Head of bed. Donned SCDs. And positioned for comfort. Therapeutic Exercises:  ankle pumps, heel slide, hip abduction/adduction and straight leg raise  x 12 reps. LAQ x 8-10 reps due to fatigue.      At end of session, patient in bed with alarm call light and phone within reach,  all lines and tubes intact, nursing notified. Patient would benefit from continued skilled Physical Therapy to improve functional independence and quality of life.          Patient's/ family goals   none stated    Time in  844  Time out  913    Total Treatment Time  29 minutes      CPT codes:    Therapeutic activities (95665)   20 minutes  1 unit(s)  Therapeutic exercises (35705)   9 minutes  1 unit(s)    Natacha Can PTA [id number]

## 2021-11-15 NOTE — PROGRESS NOTES
Department of Orthopedic Surgery  Resident Progress Note    Patient seen and examined. Somewhat confused but at baseline per nursing staff. Staff deny any acute events overnight.   Complaining of left hip pain morning    VITALS:  BP (!) 164/56   Pulse 75   Temp 98.4 °F (36.9 °C) (Oral)   Resp 18   Ht 5' 7\" (1.702 m)   Wt 190 lb (86.2 kg)   SpO2 95%   BMI 29.76 kg/m²     General: Awake and alert, confused    MUSCULOSKELETAL:   left lower extremity:  · Dressings maintained, there is a small amount of bloody discharge on the inferior dressing and a larger amount of bloody discharge on the superior dressing  · Compartments soft and compressible  · +PF/DF/EHL  · +2/4 DP & PT pulses, Brisk Cap refill, Toes warm and perfused  · Distal sensation grossly intact to Peroneals, Sural, Saphenous, and tibial nrs    CBC:   Lab Results   Component Value Date    WBC 6.8 11/13/2021    HGB 9.0 11/13/2021    HCT 28.3 11/13/2021    PLT 96 11/13/2021     PT/INR:    Lab Results   Component Value Date    PROTIME 11.6 11/11/2021    INR 1.0 11/11/2021           ASSESSMENT  · Left intertrochanteric hip fracture   · S/P left cephalomedullary nail (S[place] gamma nail) on 11/12-postoperative day #3    PLAN      · Continue physical therapy and protocol: WBAT - LLE  · 24 hour abx coverage, complete  · Deep venous thrombosis prophylaxis - , early mobilization  · PT/OT  · Pain Control: IV and PO  · Monitor H&H- 9.0, asymptomatic  · D/C Plan: DC to SNF when medically stable  · Discussed with attending

## 2021-11-15 NOTE — PLAN OF CARE
Problem: Pain:  Goal: Pain level will decrease  Description: Pain level will decrease  Outcome: Met This Shift  Goal: Control of acute pain  Description: Control of acute pain  Outcome: Met This Shift  Goal: Control of chronic pain  Description: Control of chronic pain  Outcome: Met This Shift     Problem: Skin Integrity:  Goal: Will show no infection signs and symptoms  Description: Will show no infection signs and symptoms  Outcome: Met This Shift  Goal: Absence of new skin breakdown  Description: Absence of new skin breakdown  Outcome: Met This Shift     Problem: Mental Status - Impaired:  Goal: Mental status will improve  Description: Mental status will improve  Outcome: Met This Shift     Problem: Mobility - Impaired:  Goal: Mobility will improve  Description: Mobility will improve  Outcome: Met This Shift     Problem: Fluid Volume:  Goal: Maintenance of adequate hydration will improve  Description: Maintenance of adequate hydration will improve  Outcome: Met This Shift     Problem: Urinary Elimination:  Goal: Ability to recognize the need to void and respond appropriately will improve  Description: Ability to recognize the need to void and respond appropriately will improve  Outcome: Met This Shift  Goal: Will remain free from infection  Description: Will remain free from infection  Outcome: Met This Shift

## 2021-11-15 NOTE — DISCHARGE INSTR - COC
Continuity of Care Form    Patient Name: Gisell Cadena   :  1931  MRN:  [id number]    [facility] date:  2021  Discharge date:  11/15/2021    Code Status Order: DNR-CCA   Advance Directives:      Admitting Physician: Faizan Mcallister MD  PCP: Faizan Mcallister MD    Discharging Nurse: [facility] Unit/Room#: 7980/3373-67  Discharging Unit Phone Number: [phone]    Emergency Contact:   Extended Emergency Contact Information  Primary Emergency Contact: Cheryl Arriola  Address: [address] [facility] [address]           [address] [facility] [id number]           [place], [address] Phone: [phone]  Mobile Phone: [phone]  Relation: Spouse   needed? No  Secondary Emergency Contact: Travon [facility] [address]  Phone: [phone]  Mobile Phone: [phone]  Relation: Step Child  Preferred language: English   needed?  No    Past Surgical History:  Past Surgical History:   Procedure Laterality Date    ABDOMINAL AORTIC ANEURYSM REPAIR, ENDOVASCULAR  1 year ago    2 stents    [facility]    2 vesselCABG    CAROTID ENDARTERECTOMY  rt and left stent in rt    Bilateral    CATARACT REMOVAL WITH IMPLANT Right 14    CATARACT REMOVAL WITH IMPLANT Left 14    COLONOSCOPY      ENDOSCOPY, COLON, DIAGNOSTIC      FEMUR FRACTURE SURGERY Left 2021    LEFT IM NAIL PANKAJ INSERTION (JULIA) performed by Debbie Torres DO [address]         Immunization History:   Immunization History   Administered Date(s) Administered    COVID-19, BRENDA Noyola, 30mcg/0.3mL 2021, 2021       Active Problems:  Patient Active Problem List   Diagnosis Code    Right cataract H26.9    Left cataract H26.9    Lumbar stenosis M48.061    DDD (degenerative disc disease), lumbar M51.36    History of carotid endarterectomy Z98.890    History of left common carotid artery stent placement Z98.890, Z95.828    Ambulatory dysfunction R26.2    Acute respiratory failure with hypoxia ([facility]) J96.01    Acute CHF (congestive heart failure) ([facility]) I50.9    Congestive heart failure due to cardiomyopathy ([facility]) I50.9, V70.9    Acute diastolic heart failure ([facility]) I50.31    Type 2 diabetes mellitus with circulatory disorder, with long-term current use of insulin ([facility]) E11.59, Z79.4    Stage 2 chronic kidney disease N18.2    Closed fracture of femur, intertrochanteric, left, initial encounter ([facility] S72.142A       Isolation/Infection:   Isolation            No Isolation          Patient Infection Status       Infection Onset Added Last Indicated Last Indicated By Review Planned Expiration Resolved Resolved By    None active    Resolved    COVID-19 Rule Out 06/10/21 06/10/21 06/10/21 COVID-19, Rapid (Ordered)   06/10/21 Rule-Out Test Resulted            Nurse Assessment:  Last Vital Signs: BP (!) 164/56   Pulse 75   Temp 98.4 °F (36.9 °C) (Oral)   Resp 18   Ht 5' 7\" (1.702 m)   Wt 190 lb (86.2 kg)   SpO2 94%   BMI 29.76 kg/m²     Last documented pain score (0-10 scale): Pain Level: 4  Last Weight:   Wt Readings from Last 1 Encounters:   11/11/21 190 lb (86.2 kg)     Mental Status:  disoriented and alert    IV Access:  - None    Nursing Mobility/ADLs:  Walking   Assisted  Transfer  Assisted  Bathing  Assisted  Dressing  Assisted  Toileting  Assisted  Feeding  Assisted  Med Admin  Assisted  Med Delivery   whole    Wound Care Documentation and Therapy:        Elimination:  Continence: Bowel: No  Bladder: No  Urinary Catheter: None   Colostomy/Ileostomy/Ileal Conduit: No       Date of Last BM: ***    Intake/Output Summary (Last 24 hours) at 11/15/2021 1200  Last data filed at 11/15/2021 0930  Gross per 24 hour   Intake 860 ml   Output 1600 ml   Net -740 ml     I/O last 3 completed shifts:   In: 740 [P.O.:740]  Out: 1600 [Urine:1600]    Safety Concerns:     History of Falls (last 30 days) and At Risk for Falls    Impairments/Disabilities:      Hearing    Nutrition Therapy:  Current Nutrition Therapy:   - Oral Diet:  General    Routes of Feeding: Oral  Liquids: Thin Liquids  Daily Fluid Restriction: no  Last Modified Barium Swallow with Video (Video Swallowing Test): not done    Rehab Therapies: Physical Therapy and Occupational Therapy  Weight Bearing Status/Restrictions: No weight bearing restirctions  Other Medical Equipment (for information only, NOT a DME order):  walker  Other Treatments: ***    Patient's personal belongings (please select all that are sent with patient):  Glasses, Hearing Aides bilateral    RN SIGNATURE:  Electronically signed by Layne Brunner RN on 11/15/21 at 12:02 PM EST    CASE MANAGEMENT/SOCIAL WORK SECTION    Inpatient Status Date: 11/11/2021    Readmission Risk Assessment Score:  Readmission Risk              Risk of Unplanned Readmission:  28           Discharging to Facility/ Agency   Name: [facility]  Address:  Phone: [phone]  Fax: [phone]    Dialysis Facility (if applicable)   Name:  Address:  Dialysis Schedule:  Phone:  Fax:    / signature: Electronically signed by KELLY Melton on 11/15/21 at 12:40 PM EST    PHYSICIAN SECTION    Prognosis: Good    Condition at Discharge: Stable    Rehab Potential (if transferring to Rehab): Good    Recommended Labs or Other Treatments After Discharge: ***    Physician Certification: I certify the above information and transfer of Grant Fatima  is necessary for the continuing treatment of the diagnosis listed and that he requires Merged with [facility] for less 30 days.      Update Admission H&P: {P DME Changes in [facility]:[id number]}    PHYSICIAN SIGNATURE: Electronically signed by Isabelle Cooper MD on 11/15/21 at 12:03PM EST

## 2021-11-15 NOTE — CARE COORDINATION
11/15/2021: SS Note/Discharge plan:  Yahir Lehman admissions liaison for [facility] confirming pt's admission for today at 4:00pm via [facility] ambulance, request a RAPID COVID TEST prior to transfer, pt notified and agreeable to d/c plan, unable to reach pt's step dtr or wife by phone however spoke pt's step son-in-law, [employer] who confirmed d/c plan and that he will notify his wife Synetta Schirmer, [address] and HENS exemption completed, nursing informed. Electronically signed by KELLY Martinez on 11/15/2021 at 12:34 PM

## 2021-11-15 NOTE — CARE COORDINATION
Called legal guardian Melani Langford, but mailbox was full. Unable to explain IMM letter and get verbal signature.  Electronically signed by Cruz Jacobs on 11/15/2021 at 9:33 AM

## 2021-11-15 NOTE — CARE COORDINATION
LVM for legal guardian explaining IMM letter and asked for a call back.  Electronically signed by Geno Alfonso on 11/15/2021 at 1:29 PM

## 2021-11-15 NOTE — PROGRESS NOTES
Progress Note  Date:2021       Room:0320/0320-01  Patient Nikko Palacio     YOB: 1931     Age:89 y.o. Subjective    Subjective:  Symptoms:  Stable. He reports malaise and weakness. Diet:  Adequate intake. Activity level: Impaired due to weakness. Pain:  He complains of pain that is moderate. pt is confused probably fro residual anesthesia. Review of Systems   Neurological: Positive for weakness. Objective         Vitals Last 24 Hours:  TEMPERATURE:  Temp  Av.9 °F (36.6 °C)  Min: 97.7 °F (36.5 °C)  Max: 98.4 °F (36.9 °C)  RESPIRATIONS RANGE: Resp  Av  Min: 16  Max: 18  PULSE OXIMETRY RANGE: SpO2  Av.9 %  Min: 90 %  Max: 96 %  PULSE RANGE: Pulse  Av.3  Min: 70  Max: 93  BLOOD PRESSURE RANGE: Systolic (07QOV), MLA:499 , Min:125 , CKJ:729   ; Diastolic (31FXU), PSU:72, Min:47, Max:74    I/O (24Hr): Intake/Output Summary (Last 24 hours) at 2021 2221  Last data filed at 2021 1634  Gross per 24 hour   Intake 560 ml   Output 900 ml   Net -340 ml     Objective:  General Appearance:  Comfortable. Vital signs: (most recent): Blood pressure (!) 125/47, pulse 70, temperature 98.4 °F (36.9 °C), temperature source Oral, resp. rate 18, height 5' 7\" (1.702 m), weight 190 lb (86.2 kg), SpO2 96 %. Vital signs are normal.    Output: Producing urine. HEENT: Normal HEENT exam.    Lungs:  Normal respiratory rate. He is not in respiratory distress. There are decreased breath sounds. Heart: Regular rhythm. S1 normal and S2 normal.    Abdomen: Abdomen is soft. Bowel sounds are normal.     Extremities: Normal range of motion. Pulses: Distal pulses are intact. Neurological: Patient is alert. Pupils:  Pupils are equal, round, and reactive to light. Skin:  Warm and dry.       Labs/Imaging/Diagnostics    Labs:  CBC:  Recent Labs     21  0401   WBC 6.8   RBC 2.66*   HGB 9.0*   HCT 28.3*   .4*   RDW 13.1   PLT 96* CHEMISTRIES:No results for input(s): NA, K, CL, CO2, BUN, CREATININE, GLUCOSE, PHOS, MG in the last 72 hours. Invalid input(s): CA  PT/INR:No results for input(s): PROTIME, INR in the last 72 hours. APTT:No results for input(s): APTT in the last 72 hours. LIVER PROFILE:No results for input(s): AST, ALT, BILIDIR, BILITOT, ALKPHOS in the last 72 hours. Imaging Last 24 Hours:  No results found. Assessment//Plan           Hospital Problems           Last Modified POA    Closed fracture of femur, intertrochanteric, left, initial encounter ([facility] [address] 11/11/2021 Yes        Assessment:  (Problem List as of 11/14/2021 Reviewed: 7/1/2015 11:06 AM by Shawn Sales DO    History of carotid endarterectomy    History of left common carotid artery stent placement    Ambulatory dysfunction    Right cataract    Left cataract    Lumbar stenosis    DDD (degenerative disc disease), lumbar    Acute respiratory failure with hypoxia (HCC)    Acute CHF (congestive heart failure) (HCC)    Congestive heart failure due to cardiomyopathy ([address]    Acute diastolic heart failure ([address]    Type 2 diabetes mellitus with circulatory disorder, with long-term   current use of insulin (HCC)    Stage 2 chronic kidney disease    Closed fracture of femur, intertrochanteric, left, initial encounter   ([address]    ). Plan:   (Cont with current treatment. We will see how he does tomorrow).        Electronically signed by Sara Summers MD on 11/14/21 at 10:21 PM EST

## 2021-12-06 NOTE — DISCHARGE SUMMARY
fL     Status: [facility]                                           Date: 11/11/2021Value: 33.7        Ref range: 26.0 - 35.0 pg     Status: [address]                                          Date: 11/11/2021Value: 32.1        Ref range: 32.0 - 34.5 %      Status: FinalRDW                                           Date: 11/11/2021Value: 13.0        Ref range: 11.5 - 15.0 fL     Status: FinalPlatelets                                     Date: 11/11/2021Value: 114*        Ref range: 130 - 450 E9/L     Status: FinalMPV                                           Date: 11/11/2021Value: 10.6        Ref range: 7.0 - 12.0 fL      Status: FinalNeutrophils %                                 Date: 11/11/2021Value: 88.5*       Ref range: 43.0 - 80.0 %      Status: FinalLymphocytes %                                 Date: 11/11/2021Value: 9.7*        Ref range: 20.0 - 42.0 %      Status: FinalMonocytes %                                   Date: 11/11/2021Value: 1.8*        Ref range: 2.0 - 12.0 %       Status: FinalEosinophils %                                 Date: 11/11/2021Value: 0.6         Ref range: 0.0 - 6.0 %        Status: FinalBasophils %                                   Date: 11/11/2021Value: 0.1         Ref range: 0.0 - 2.0 %        Status: FinalNeutrophils Absolute                          Date: 11/11/2021Value: 7.57*       Ref range: 1.80 - 7.30 E9/L   Status: FinalLymphocytes Absolute                          Date: 11/11/2021Value: 0.85*       Ref range: 1.50 - 4.00 E9/L   Status: FinalMonocytes Absolute                            Date: 11/11/2021Value: 0.17        Ref range: 0.10 - 0.95 E9/L   Status: FinalEosinophils Absolute                          Date: 11/11/2021Value: 0.00*       Ref range: 0.05 - 0.50 E9/L   Status: FinalBasophils Absolute                            Date: 11/11/2021Value: 0.00        Ref range: 0.00 - 0.20 E9/L   Status: FinalPolychromasia                                 Date: 11/11/2021Value: 1+            Status: FinalPoikilocytes                                  Date: 11/11/2021Value: 1+            Status: FinalOvalocytes                                    Date: 11/11/2021Value: 1+            Status: FinalSodium                                        Date: 11/11/2021Value: 140         Ref range: 132 - 146 mmol/L   Status: FinalPotassium                                     Date: 11/11/2021Value: 4.4         Ref range: 3.5 - 5.0 mmol/L   Status: FinalChloride                                      Date: 11/11/2021Value: 105         Ref range: 98 - 107 mmol/L    Status: FinalCO2                                           Date: 11/11/2021Value: 23          Ref range: 22 - 29 mmol/L     Status: FinalAnion Gap                                     Date: 11/11/2021Value: 12          Ref range: 7 - 16 mmol/L      Status: FinalGlucose                                       Date: 11/11/2021Value: 102*        Ref range: 74 - 99 mg/dL      Status: FinalBUN                                           Date: 11/11/2021Value: 24*         Ref range: 6 - 23 mg/dL       Status: FinalCREATININE                                    Date: 11/11/2021Value: 1.1         Ref range: 0.7 - 1.2 mg/dL    Status: FinalGFR Non-                      Date: 11/11/2021Value: >60         Ref range: >=60 mL/min/1.73   Status: Final              Comment: Chronic Kidney Disease: less than 60 ml/min/1.73 sq.m. Kidney Failure: less than 15 ml/min/1.73 sq. m. Results valid for patients 18 years and older. GFR                           Date: 11/11/2021Value: >60           Status: FinalCalcium                                       Date: 11/11/2021Value: 8.3*        Ref range: 8.6 - 10.2 mg/dL   Status: FinalTotal Protein                                 Date: 11/11/2021Value: 6.1*        Ref range: 6.4 - 8.3 g/dL     Status: FinalAlbumin                                       Date: 11/11/2021Value: 3.4*        Ref range: 3.5 - 5.2 g/dL     Status: FinalTotal Bilirubin                               Date: 11/11/2021Value: 0.6         Ref range: 0.0 - 1.2 mg/dL    Status: FinalAlkaline Phosphatase                          Date: 11/11/2021Value: 103         Ref range: 40 - 129 U/L       Status: FinalALT                                           Date: 11/11/2021Value: 10          Ref range: 0 - 40 U/L         Status: FinalAST                                           Date: 11/11/2021Value: 20          Ref range: 0 - 39 U/L         Status: FinalProtime                                       Date: 11/11/2021Value: 11.6        Ref range: 9.3 - 12.4 sec     Status: FinalINR                                           Date: 11/11/2021Value: 1.0           Status: FinalaPTT                                          Date: 11/11/2021Value: 28.3        Ref range: 24.5 - 35.1 sec    Status: FinalMeter Glucose                                 Date: 11/11/2021Value: 99          Ref range: 74 - 99 mg/dL      Status: FinalMeter Glucose                                 Date: 11/11/2021Value: 102*        Ref range: 74 - 99 mg/dL      Status: FinalMeter Glucose                                 Date: 11/12/2021Value: 101*        Ref range: 74 - 99 mg/dL      Status: FinalMeter Glucose                                 Date: 11/12/2021Value: 116*        Ref range: 74 - 99 mg/dL      Status: FinalMeter Glucose                                 Date: 11/12/2021Value: 113*        Ref range: 74 - 99 mg/dL      Status: [address] [facility]                                           Date: 11/13/2021Value: 6.8         Ref range: 4.5 - 11.5 E9/L    Status: FinalRBC                                           Date: 11/13/2021Value: 2.66*       Ref range: 3.80 - 5.80 E12/L  Status: FinalHemoglobin                                    Date: 11/13/2021Value: 9.0*        Ref range: 12.5 - 16.5 g/dL   Status: FinalHematocrit                                    Date: 11/13/2021Value: 28.3*       Ref range: 37.0 - 54.0 %      Status: FinalMCV                                           Date: 11/13/2021Value: 106.4*      Ref range: 80.0 - 99.9 fL     Status: [address] [place]                                           Date: 11/13/2021Value: 33.8        Ref range: 26.0 - 35.0 pg     Status: [address]                                          Date: 11/13/2021Value: 31.8*       Ref range: 32.0 - 34.5 %      Status: FinalRDW                                           Date: 11/13/2021Value: 13.1        Ref range: 11.5 - 15.0 fL     Status: FinalPlatelets                                     Date: 11/13/2021Value: 96*         Ref range: 130 - 450 E9/L     Status: FinalMPV                                           Date: 11/13/2021Value: 11.0        Ref range: 7.0 - 12.0 fL      Status: FinalMeter Glucose                                 Date: 11/12/2021Value: 262*        Ref range: 74 - 99 mg/dL      Status: FinalPlatelet Confirmation                         Date: 11/13/2021Value: CONFIRMED     Status: FinalMeter Glucose                                 Date: 11/13/2021Value: 230*        Ref range: 74 - 99 mg/dL      Status: FinalMeter Glucose                                 Date: 11/13/2021Value: 188*        Ref range: 74 - 99 mg/dL      Status: FinalMeter Glucose                                 Date: 11/13/2021Value: 154*        Ref range: 74 - 99 mg/dL      Status: FinalMeter Glucose                                 Date: 11/13/2021Value: 149*        Ref range: 74 - 99 mg/dL      Status: FinalMeter Glucose                                 Date: 11/14/2021Value: 94          Ref range: 74 - 99 mg/dL      Status: FinalMeter Glucose                                 Date: 11/14/2021Value: 130*        Ref range: 74 - 99 mg/dL      Status: FinalMeter Glucose                                 Date: 11/14/2021Value: 192*        Ref range: 74 - 99 mg/dL      Status: FinalMeter Glucose                                 Date: 11/14/2021Value: 235* Ref range: 74 - 99 mg/dL      Status: FinalMeter Glucose                                 Date: 11/15/2021Value: 165*        Ref range: 74 - 99 mg/dL      Status: FinalMeter Glucose                                 Date: 11/15/2021Value: 279*        Ref range: 74 - 99 mg/dL      Status: QfmzyUIXM-NqA-1, NAAT                              Date: 11/15/2021Value: Not Detected                   Ref range: Not Detected       Status: Final              Comment: Rapid NAAT:   Negative results should be treated as presumptive and,if inconsistent with clinical signs and symptoms or necessary forpatient management, should be tested with an alternative molecularassay. Negative results do not preclude SARS-CoV-2 infection andshould not be used as the sole basis for patient management decisions. This test has been authorized by the FDA under an Emergency UseAuthorization (EUA) for use by authorized laboratories. Fact sheet for Healthcare [employer] sheet for Patients: Ralf.dk: Isothermal Nucleic Acid Amplification------------    Radiology last 7 days:  No results found. @[facility]@    Discharge Medications    Discharge Medication List as of 11/15/2021 12:09 PMSTART taking these medicationsHYDROcodone-acetaminophen (NORCO) 5-325 MG per tabletTake 1 tablet by mouth every 4 hours as needed for Pain for up to 7 days. Intended supply: 7 days.  Take lowest dose possible to manage pain, Disp-42 tablet, R-0Print    Discharge Medication List as of 11/15/2021 12:09 PM    Discharge Medication List as of 11/15/2021 12:09 PMCONTINUE these medications which have NOT CHANGEDpolyethylene glycol (GLYCOLAX) 17 GM/SCOOP powderTake 17 g by mouth 2 times dailyHistorical Medwhite petrolatum OINT ointmentApply topically 2 times daily as needed, Topical, 2 TIMES DAILY PRN, Historical Medspironolactone (ALDACTONE) 25 MG tabletTake 1 tablet by mouth daily, Disp-30 tablet, R-3Normalcarbidopa-levodopa (SINEMET)  MG per tabletTake 1 tablet by mouth 3 times dailyHistorical Med!! dapsone 25 MG tabletTake 50 mg by mouth daily (with breakfast)Historical MedFLUoxetine (PROZAC) 10 MG tabletTake 10 mg by mouth nightlyHistorical Medfluticasone (FLONASE) 50 MCG/ACT nasal spray2 sprays by Each Nostril route dailyHistorical Medaspirin 81 MG chewable tabletTake 81 mg by mouth dailyHistorical MedcloNIDine (CATAPRES) 0.3 MG tabletTake 0.3 mg by mouth 2 times dailyHistorical Med!! insulin lispro protamine & lispro (HUMALOG MIX) (75-25) 100 UNIT per ML SUSP injection vialInject 20 Units into the skin every morning Historical Med!! insulin lispro protamine & lispro (HUMALOG MIX) (75-25) 100 UNIT per ML SUSP injection vialInject 10 Units into the skin nightly Historical MedMAGNESIUM POTake 400 mg by mouth daily Historical Medisosorbide mononitrate (IMDUR) 30 MG extended release tabletTake 30 mg by mouth daily Historical Medfurosemide (LASIX) 20 MG tabletTake 20 mg by mouth daily Historical Medrosuvastatin (CRESTOR) 5 MG tabletTake 5 mg by mouth every other dayHistorical Medtamsulosin (FLOMAX) 0.4 MG capsuleTake 0.8 mg by mouth nightly , R-0Historical Medmetoprolol tartrate (LOPRESSOR) 50 MG tabletTake 25 mg by mouth 2 times daily Historical Medlosartan (COZAAR) 100 MG tabletTake 100 mg by mouth daily. Historical Medamlodipine (NORVASC) 5 MG tabletTake 5 mg by mouth daily Historical MednitroGLYCERIN (NITROSTAT) 0.4 MG SL tabletPlace 0.4 mg under the tongue every 5 minutes as needed. Historical Medclopidogrel (PLAVIX) 75 MG tabletTake 75 mg by mouth daily. Historical Med!! dapsone 25 MG tabletTake 25 mg by mouth nightly Historical Med!! - Potential duplicate medications found. Please discuss with provider.     Discharge Medication List as of 11/15/2021 12:09 PM    Time Spent on Discharge:E] minutes were spent in patient examination, evaluation, counseling as well as medication reconciliation, prescriptions for required medications, discharge plan, and follow up.     Electronically signed by Claudia Resendiz MD on 12/6/21 at 12:45 AM EST

## 2021-12-20 NOTE — TELEPHONE ENCOUNTER
Spoke with Tiago Carrillo, pt's legal guardian, pt is in [facility] and is to get a feeding tube. They are not interested in the clinic at this time.

## 2022-01-01 ENCOUNTER — HOSPITAL ENCOUNTER (OUTPATIENT)
Dept: GENERAL RADIOLOGY | Age: 87
Discharge: HOME | End: 2022-02-16
Payer: MEDICARE

## 2022-01-01 DIAGNOSIS — R13.12 DYSPHAGIA, OROPHARYNGEAL: ICD-10-CM

## 2022-01-01 PROCEDURE — 92526 ORAL FUNCTION THERAPY: CPT | Performed by: SPEECH-LANGUAGE PATHOLOGIST

## 2022-01-01 PROCEDURE — 74230 X-RAY XM SWLNG FUNCJ C+: CPT

## 2022-01-01 PROCEDURE — 2500000003 HC RX 250 WO HCPCS: Performed by: FAMILY MEDICINE

## 2022-01-01 PROCEDURE — 92611 MOTION FLUOROSCOPY/SWALLOW: CPT | Performed by: SPEECH-LANGUAGE PATHOLOGIST

## 2022-01-01 RX ADMIN — BARIUM SULFATE 45 G: 0.81 POWDER, FOR SUSPENSION ORAL at 11:21

## 2022-01-01 RX ADMIN — BARIUM SULFATE 45 G: 0.6 CREAM ORAL at 11:21

## 2022-01-01 RX ADMIN — BARIUM SULFATE 45 ML: 400 SUSPENSION ORAL at 11:20

## 2022-02-14 NOTE — PROGRESS NOTES
SPEECH/LANGUAGE PATHOLOGY  VIDEOFLUOROSCOPIC STUDY OF SWALLOWING (MBS)   and PLAN OF CARE    PATIENT NAME:  Luci Douglas  (male)     MRN:  [id number]    :  1931  (80 y.o.)  STATUS:  Outpatient    TODAY'S DATE:  2022  REFERRING PROVIDER:   Dr. Susan Bowman: [place] modified barium swallow with video  Date of order:  22   REASON FOR REFERRAL: pharyngeal dysphagia   EVALUATING THERAPIST: Elly William SLP      RESULTS:      DYSPHAGIA DIAGNOSIS:  moderate-severe oropharyngeal phase dysphagia     DIET RECOMMENDATIONS:  NPO     Trials of pudding consistency (extremely thick - IDDSI level 4)  Liquids WITH SLP ONLY if primary SLP feels appropriate     FEEDING RECOMMENDATIONS:    Assistance level:  Not applicable     Compensatory strategies recommended: Not applicable     Discussed recommendations with nursing and/or faxed report to referring provider: Yes    Laryngeal Penetration and Aspiration:  Penetration WITH aspiration was observed in today's study with  mildly thick liquid (nectar), moderately thick liquid (honey)    SPEECH THERAPY  PLAN OF CARE   The dysphagia POC is established based on physician order and dysphagia diagnosis    Skilled SLP intervention for dysphagia management on acute care 3-5 x per week until goals met, pt plateaus in function.   Therapy at the discretion of facility/treating Speech Pathologist       Conditions Requiring Skilled Therapeutic Intervention for dysphagia:    Reduced pharyngeal clearing of the bolus  Reduced laryngeal closure resulting in penetration  Reduced laryngeal closure resulting in aspiration     SPECIFIC DYSPHAGIA INTERVENTIONS TO INCLUDE:     Therapeutic exercises  Trials of upgraded diet/liquid     Specific instructions for next treatment:  initiate instruction of therapeutic exercises  and ongoing skilled PO analysis to determine if PO diet can be initiated   Treatment Goals:    Short Term Goals:  Pt will participate in ongoing evaluation of swallow function to determine when PO diet can be safely initiated  Pt will complete BOTR strength/ ROM exercises to reduce pharyngeal residuals and improve epiglottic inversion minimal verbal prompts  Pt will complete laryngeal strength/ ROM therapeutic exercises to improve airway protection for the least restrictive PO diet minimal verbal prompts    Long Term Goals:   Pt will improve oropharyngeal swallow function to ensure airway protection during PO intake to maintain adequate nutrition/hydration and decrease signs/symptoms of aspiration to less than 1 x/day. Patient/family Goal:    To be able to [facility] discussed with Patient   The Patient did not demonstrate complete understanding of the diagnosis, prognosis and plan of care     Rehabilitation Potential/Prognosis: fair                      ADMITTING DIAGNOSIS: Dysphagia, oropharyngeal [R13.12]     VISIT DIAGNOSIS:   Visit Diagnoses       Codes    Dysphagia, oropharyngeal     R13.12              PATIENT REPORT/COMPLAINT: patient currently NPO pending results of this evaluation    PRIOR LEVEL OF SWALLOW FUNCTION:    Past History of Dysphagia?:  yes    Home diet: NPO -- including medications. Nutrition, fluids and medication to be administered through current PEG tube. Current Diet Order:  No diet orders on file    PROCEDURE:  Consistencies Administered During the Evaluation   Liquids: nectar thick liquid and honey thick liquid   Solids:  pureed foods      Method of Intake:   spoon  Fed by clinician      Position:   Seated, upright, Lateral plane    INSTRUMENTAL ASSESSMENT:  MBSImP Results:   Lip closure for intraoral bolus containment resulted in no labial escape. Tongue control during bolus hold maintained a cohesive bolus held between tongue to palate seal. Bolus preparation and mastication resulted in timely and efficient chewing and mashing. Bolus transport/lingual motion was with brisk tongue motion. Oral residue was not observed. There was complete oral clearance. Initiation of the pharyngeal swallow occurred as the bolus head reached the pyriform sinuses. Soft palate elevation resulted in no bolus between the soft palate and the pharyngeal wall. Laryngeal elevation demonstrated partial superior movement of the thyroid cartilage with partial approximation of the arytenoids to the epiglottic petiole. Anterior hyoid excursion demonstrated partial anterior movement. Epiglottic movement resulted in partial inversion. Laryngeal vestibule closure was incomplete, as indicated by a narrow column of air or contrast within the laryngeal vestibule at the height of the swallow. Pharyngeal stripping wave was present but diminished. Pharyngeal contraction could not be determined due to logistical reasons not related to physiologic impairment. Pharyngoesophageal segment opening was completely distended for complete duration with no obstruction of bolus flow. Tongue base retraction allowed a collection of residue between the retracted tongue base and the posterior pharyngeal wall. A collection of residue remained within or on the pharyngeal structures and this did resulted in spill over aspiration at time from the residue. Esophageal clearance in the upright position could not be assessed due to logistical reasons not related to physiologic impairment.        PENETRATION-ASPIRATION SCALE (PAS):  THIN item not administered  MILDLY THICK 8 = Material enters the airway, passes below the vocal folds, and no effort is made to eject tsp administration only   MODERATELY THICK 8 = Material enters the airway, passes below the vocal folds, and no effort is made to eject with loaded tsp large volume of aspiration with a very delayed ineffective cough  PUREE 1 = Material does not enter the airway but increased residue/poor clearing places him at increased risk of aspiration on purees if they thin out with his own saliva   HARD SOLID item not administered       COMPENSATORY STRATEGIES    Compensatory strategies were not attempted      STRUCTURAL/FUNCTIONAL ANOMALIES   No structural/functional anomalies were noted    CERVICAL ESOPHAGEAL STAGE :     The cervical esophagus appeared adequate          ___________    Cognition:   Follows 1 - step directions appropriate for this assessment    Oral Peripheral Examination   Generalized oral weakness    Current Respiratory Status   room air     Parameters of Speech Production  Respiration:  Adequate for speech production  Quality:   Could not test  Intensity: Could not test    Pain: No pain reported. EDUCATION:   The Speech Language Pathologist (SLP) education regarding results of evaluation and that intervention is warranted at this time. Learner: Patient  Education: Reviewed results and recommendations of this evaluationcompleted   Evaluation of Education:  Needs further instruction    This plan may be re-evaluated and revised as warranted. Evaluation Time includes thorough review of current medical information, gathering information on past medical history/social history and prior level of function, completion of standardized testing/informal observation of tasks, assessment of data and education on plan of care and goals. [x]The admitting diagnosis and active problem list, have been reviewed prior to initiation of this evaluation. CPT Code: 53631  dysphagia study    Patient seen for swallow therapy 10 minutes. Family present Reviewed current solid/liquid consistency diet recommendation for   NPO (nothing by mouth including oral meds)  and discussed compensatory strategies to ensure safe PO intake. Reviewed aspiration precautions.   Discussed use of NPO with PO trials with primary SLP only if they deem appropriate since they know patients history and endurance to decrease risk of aspiration with implementation of strengthening exercises to improve swallow function as the long term goal. Patient was not able to return demonstration or restate compensatory strategies during session. Patient will require ongoing education regarding dysphagia secondary to decreased ability to restate strategies during session.         [id number]  dysphagia tx    Teri Lee MSCCC/SLP  Speech Language Pathologist  [id number]

## (undated) DEVICE — TOWEL,OR,DSP,ST,BLUE,STD,6/PK,12PK/CS: Brand: MEDLINE

## (undated) DEVICE — TUBING, SUCTION, 1/4" X 10', STRAIGHT: Brand: MEDLINE

## (undated) DEVICE — GOWN,SIRUS,POLYRNF,BRTHSLV,XLN/XL,20/CS: Brand: MEDLINE

## (undated) DEVICE — Device

## (undated) DEVICE — ONE STEP CONICAL REAMER: Brand: GAMMA

## (undated) DEVICE — 6617 IOBAN II PATIENT ISOLATION DRAPE 5/BX,4BX/CS: Brand: STERI-DRAPE™ IOBAN™ 2

## (undated) DEVICE — SYRINGE 20ML LL S/C 50

## (undated) DEVICE — PACK,BASIC I: Brand: MEDLINE

## (undated) DEVICE — ELECTRODE PT RET AD L9FT HI MOIST COND ADH HYDRGEL CORDED

## (undated) DEVICE — MEDI-VAC YANKAUER SUCTION HANDLE: Brand: CARDINAL HEALTH

## (undated) DEVICE — DOUBLE BASIN SET: Brand: MEDLINE INDUSTRIES, INC.

## (undated) DEVICE — GOWN,SIRUS,FABRNF,XL,20/CS: Brand: MEDLINE

## (undated) DEVICE — INTENDED FOR TISSUE SEPARATION, AND OTHER PROCEDURES THAT REQUIRE A SHARP SURGICAL BLADE TO PUNCTURE OR CUT.: Brand: BARD-PARKER ® STAINLESS STEEL BLADES

## (undated) DEVICE — SPONGE,LAP,12"X12",XR,ST,5/PK,40PK/CS: Brand: MEDLINE

## (undated) DEVICE — Z DISCONTINUED USE 2275686 GLOVE SURG SZ 8 L12IN FNGR THK13MIL WHT ISOLEX POLYISOPRENE

## (undated) DEVICE — COVER,TABLE,44X90,STERILE: Brand: MEDLINE

## (undated) DEVICE — GAUZE,SPONGE,4"X4",16PLY,STRL,LF,10/TRAY: Brand: MEDLINE

## (undated) DEVICE — DRAPE 64X41IN RADIOLOGY C ARM EQUIP STER

## (undated) DEVICE — Z DISCONTINUED PER MEDLINE USE 2741942 DRESSING AQUACEL 6 IN ALG W9XL15CM SIL CVR WTRPRF VIR BACT BARR ANTIMIC

## (undated) DEVICE — K-WIRE
Type: IMPLANTABLE DEVICE | Site: HIP | Status: NON-FUNCTIONAL
Brand: GAMMA
Removed: 2021-11-12

## (undated) DEVICE — BANDAGE COMPR L W4INXL11YD 100% COT WVN E DBL LEN CLP CLSR

## (undated) DEVICE — DRESSING GZ W1XL8IN COT XRFRM N ADH OVERWRAP CURAD

## (undated) DEVICE — APPLICATOR MEDICATED 26 CC SOLUTION HI LT ORNG CHLORAPREP

## (undated) DEVICE — PADDING,UNDERCAST,COTTON, 4"X4YD STERILE: Brand: MEDLINE

## (undated) DEVICE — SOLUTION IV IRRIG POUR BRL 0.9% SODIUM CHL 2F7124

## (undated) DEVICE — SET MAJOR INSTR ORTHO

## (undated) DEVICE — SYRINGE IRRIG 60ML SFT PLIABLE BLB EZ TO GRP 1 HND USE W/

## (undated) DEVICE — PENCIL ES L3M BTTN SWCH HOLSTER W/ BLDE ELECTRD EDGE

## (undated) DEVICE — DRILL, AO SMALL: Brand: GAMMA

## (undated) DEVICE — COVER,LIGHT HANDLE,FLX,1/PK: Brand: MEDLINE INDUSTRIES, INC.

## (undated) DEVICE — NDL CNTR 40CT FM MAG: Brand: MEDLINE INDUSTRIES, INC.

## (undated) DEVICE — MARKER,SKIN,WI/RULER AND LABELS: Brand: MEDLINE

## (undated) DEVICE — LAG SCREW STEP DRILL: Brand: GAMMA